# Patient Record
Sex: MALE | Race: WHITE | Employment: OTHER | URBAN - METROPOLITAN AREA
[De-identification: names, ages, dates, MRNs, and addresses within clinical notes are randomized per-mention and may not be internally consistent; named-entity substitution may affect disease eponyms.]

---

## 2017-01-04 PROCEDURE — 93288 INTERROG EVL PM/LDLS PM IP: CPT | Performed by: INTERNAL MEDICINE

## 2017-01-12 DIAGNOSIS — Z95.0 CARDIAC PACEMAKER IN SITU: Primary | ICD-10-CM

## 2017-01-12 DIAGNOSIS — I25.10 ASHD (ARTERIOSCLEROTIC HEART DISEASE): ICD-10-CM

## 2017-05-09 RX ORDER — SPIRONOLACTONE 25 MG/1
25 TABLET ORAL DAILY
Qty: 90 TABLET | Refills: 3 | Status: SHIPPED | OUTPATIENT
Start: 2017-05-09 | End: 2017-06-21 | Stop reason: SDUPTHER

## 2017-06-08 ENCOUNTER — OFFICE VISIT (OUTPATIENT)
Dept: CARDIOLOGY | Age: 82
End: 2017-06-08
Payer: MEDICARE

## 2017-06-08 VITALS
HEART RATE: 81 BPM | SYSTOLIC BLOOD PRESSURE: 115 MMHG | WEIGHT: 200 LBS | BODY MASS INDEX: 31.39 KG/M2 | DIASTOLIC BLOOD PRESSURE: 78 MMHG | HEIGHT: 67 IN | OXYGEN SATURATION: 97 %

## 2017-06-08 DIAGNOSIS — Z79.01 CHRONIC ANTICOAGULATION: ICD-10-CM

## 2017-06-08 DIAGNOSIS — I25.10 ASHD (ARTERIOSCLEROTIC HEART DISEASE): ICD-10-CM

## 2017-06-08 DIAGNOSIS — I10 ESSENTIAL HYPERTENSION: ICD-10-CM

## 2017-06-08 DIAGNOSIS — I48.20 CHRONIC ATRIAL FIBRILLATION (HCC): Primary | ICD-10-CM

## 2017-06-08 DIAGNOSIS — Z95.1 S/P CABG (CORONARY ARTERY BYPASS GRAFT): ICD-10-CM

## 2017-06-08 DIAGNOSIS — E78.5 DYSLIPIDEMIA: ICD-10-CM

## 2017-06-08 PROCEDURE — 1123F ACP DISCUSS/DSCN MKR DOCD: CPT | Performed by: INTERNAL MEDICINE

## 2017-06-08 PROCEDURE — G8598 ASA/ANTIPLAT THER USED: HCPCS | Performed by: INTERNAL MEDICINE

## 2017-06-08 PROCEDURE — 4040F PNEUMOC VAC/ADMIN/RCVD: CPT | Performed by: INTERNAL MEDICINE

## 2017-06-08 PROCEDURE — G8427 DOCREV CUR MEDS BY ELIG CLIN: HCPCS | Performed by: INTERNAL MEDICINE

## 2017-06-08 PROCEDURE — 1036F TOBACCO NON-USER: CPT | Performed by: INTERNAL MEDICINE

## 2017-06-08 PROCEDURE — 99214 OFFICE O/P EST MOD 30 MIN: CPT | Performed by: INTERNAL MEDICINE

## 2017-06-08 PROCEDURE — G8417 CALC BMI ABV UP PARAM F/U: HCPCS | Performed by: INTERNAL MEDICINE

## 2017-06-08 RX ORDER — METOPROLOL SUCCINATE 50 MG/1
50 TABLET, EXTENDED RELEASE ORAL 2 TIMES DAILY
Qty: 30 TABLET | Refills: 3 | Status: SHIPPED | OUTPATIENT
Start: 2017-06-08 | End: 2017-08-09 | Stop reason: SDUPTHER

## 2017-06-21 RX ORDER — SPIRONOLACTONE 25 MG/1
25 TABLET ORAL DAILY
Qty: 90 TABLET | Refills: 3 | Status: SHIPPED | OUTPATIENT
Start: 2017-06-21 | End: 2018-07-24 | Stop reason: SDUPTHER

## 2017-06-21 RX ORDER — RIVAROXABAN 15 MG/1
15 TABLET, FILM COATED ORAL DAILY
Qty: 90 TABLET | Refills: 3 | Status: ON HOLD | OUTPATIENT
Start: 2017-06-21 | End: 2018-05-05 | Stop reason: HOSPADM

## 2017-07-06 ENCOUNTER — TELEPHONE (OUTPATIENT)
Dept: CARDIOLOGY | Age: 82
End: 2017-07-06

## 2017-08-09 RX ORDER — METOPROLOL SUCCINATE 50 MG/1
50 TABLET, EXTENDED RELEASE ORAL 2 TIMES DAILY
Qty: 180 TABLET | Refills: 3 | Status: SHIPPED | OUTPATIENT
Start: 2017-08-09 | End: 2018-04-10 | Stop reason: SDUPTHER

## 2017-10-05 PROCEDURE — 93294 REM INTERROG EVL PM/LDLS PM: CPT | Performed by: INTERNAL MEDICINE

## 2017-10-05 PROCEDURE — 93296 REM INTERROG EVL PM/IDS: CPT | Performed by: INTERNAL MEDICINE

## 2017-10-11 ENCOUNTER — TELEPHONE (OUTPATIENT)
Dept: CARDIOLOGY | Age: 82
End: 2017-10-11

## 2017-10-11 DIAGNOSIS — I48.19 PERSISTENT ATRIAL FIBRILLATION (HCC): ICD-10-CM

## 2017-10-11 DIAGNOSIS — I48.91 ATRIAL FIBRILLATION WITH RAPID VENTRICULAR RESPONSE (HCC): ICD-10-CM

## 2017-10-11 DIAGNOSIS — Z95.0 CARDIAC PACEMAKER IN SITU: Primary | ICD-10-CM

## 2017-10-11 NOTE — TELEPHONE ENCOUNTER
Pt called back to find out more about his results on home monitoring but had to leave message. Called patient back and asked what his questions were. Pt stated that he was wondering if he was in rhythm at all. Spoke with Dr Varinder Lujan and Dr. Varinder Lujan had looked over home monitor results again and had stated that the patient has been in afib 100 percent of the time but his HR is well controlled. Let pt know this information and pt verbalized understanding.

## 2017-10-23 ENCOUNTER — HOSPITAL ENCOUNTER (OUTPATIENT)
Dept: LAB | Age: 82
Discharge: HOME | End: 2017-10-23
Payer: MEDICARE

## 2017-10-23 LAB
ABSOLUTE EOS #: 0.2 K/UL (ref 0–0.4)
ABSOLUTE IMMATURE GRANULOCYTE: NORMAL K/UL (ref 0–0.3)
ABSOLUTE LYMPH #: 2.6 K/UL (ref 1–4.8)
ABSOLUTE MONO #: 0.5 K/UL (ref 0–1)
ANION GAP SERPL CALCULATED.3IONS-SCNC: 20 MMOL/L (ref 9–17)
BASOPHILS # BLD: 1 %
BASOPHILS ABSOLUTE: 0 K/UL (ref 0–0.2)
BUN BLDV-MCNC: 27 MG/DL (ref 8–23)
BUN/CREAT BLD: 20 (ref 9–20)
CALCIUM SERPL-MCNC: 9.4 MG/DL (ref 8.6–10.4)
CHLORIDE BLD-SCNC: 105 MMOL/L (ref 98–107)
CO2: 20 MMOL/L (ref 20–31)
CREAT SERPL-MCNC: 1.34 MG/DL (ref 0.7–1.2)
DIFFERENTIAL TYPE: NORMAL
EOSINOPHILS RELATIVE PERCENT: 2 %
ESTIMATED AVERAGE GLUCOSE: 120 MG/DL
GFR AFRICAN AMERICAN: >60 ML/MIN
GFR NON-AFRICAN AMERICAN: 51 ML/MIN
GFR SERPL CREATININE-BSD FRML MDRD: ABNORMAL ML/MIN/{1.73_M2}
GFR SERPL CREATININE-BSD FRML MDRD: ABNORMAL ML/MIN/{1.73_M2}
GLUCOSE BLD-MCNC: 105 MG/DL (ref 70–99)
HBA1C MFR BLD: 5.8 % (ref 4.8–5.9)
HCT VFR BLD CALC: 46.1 % (ref 41–53)
HEMOGLOBIN: 14.7 G/DL (ref 13.5–17)
IMMATURE GRANULOCYTES: NORMAL %
LYMPHOCYTES # BLD: 40 %
MCH RBC QN AUTO: 28.5 PG (ref 26–34)
MCHC RBC AUTO-ENTMCNC: 31.9 G/DL (ref 31–37)
MCV RBC AUTO: 89.3 FL (ref 80–100)
MONOCYTES # BLD: 7 %
PDW BLD-RTO: 14.8 % (ref 12.1–15.2)
PLATELET # BLD: 159 K/UL (ref 140–450)
PLATELET ESTIMATE: NORMAL
PMV BLD AUTO: 8.4 FL (ref 6–12)
POTASSIUM SERPL-SCNC: 5.3 MMOL/L (ref 3.7–5.3)
RBC # BLD: 5.16 M/UL (ref 4.5–5.9)
RBC # BLD: NORMAL 10*6/UL
SEG NEUTROPHILS: 50 %
SEGMENTED NEUTROPHILS ABSOLUTE COUNT: 3.3 K/UL (ref 1.8–7.7)
SODIUM BLD-SCNC: 145 MMOL/L (ref 135–144)
WBC # BLD: 6.6 K/UL (ref 3.5–11)
WBC # BLD: NORMAL 10*3/UL

## 2017-10-23 PROCEDURE — 80048 BASIC METABOLIC PNL TOTAL CA: CPT

## 2017-10-23 PROCEDURE — 36415 COLL VENOUS BLD VENIPUNCTURE: CPT

## 2017-10-23 PROCEDURE — 85025 COMPLETE CBC W/AUTO DIFF WBC: CPT

## 2017-10-23 PROCEDURE — 83036 HEMOGLOBIN GLYCOSYLATED A1C: CPT

## 2017-12-12 ENCOUNTER — OFFICE VISIT (OUTPATIENT)
Dept: CARDIOLOGY | Age: 82
End: 2017-12-12
Payer: MEDICARE

## 2017-12-12 VITALS
HEART RATE: 69 BPM | DIASTOLIC BLOOD PRESSURE: 76 MMHG | HEIGHT: 67 IN | OXYGEN SATURATION: 97 % | SYSTOLIC BLOOD PRESSURE: 118 MMHG | WEIGHT: 208.8 LBS | BODY MASS INDEX: 32.77 KG/M2

## 2017-12-12 DIAGNOSIS — Z79.01 CHRONIC ANTICOAGULATION: ICD-10-CM

## 2017-12-12 DIAGNOSIS — I10 ESSENTIAL HYPERTENSION: ICD-10-CM

## 2017-12-12 DIAGNOSIS — I48.20 CHRONIC ATRIAL FIBRILLATION (HCC): Primary | ICD-10-CM

## 2017-12-12 DIAGNOSIS — I50.32 HEART FAILURE, DIASTOLIC, CHRONIC (HCC): ICD-10-CM

## 2017-12-12 DIAGNOSIS — I50.32 CHRONIC DIASTOLIC CHF (CONGESTIVE HEART FAILURE), NYHA CLASS 3 (HCC): ICD-10-CM

## 2017-12-12 DIAGNOSIS — E78.5 DYSLIPIDEMIA: ICD-10-CM

## 2017-12-12 DIAGNOSIS — I20.8 CHRONIC STABLE ANGINA (HCC): ICD-10-CM

## 2017-12-12 DIAGNOSIS — Z95.1 S/P CABG (CORONARY ARTERY BYPASS GRAFT): ICD-10-CM

## 2017-12-12 DIAGNOSIS — I25.10 ASHD (ARTERIOSCLEROTIC HEART DISEASE): ICD-10-CM

## 2017-12-12 PROCEDURE — 1123F ACP DISCUSS/DSCN MKR DOCD: CPT | Performed by: INTERNAL MEDICINE

## 2017-12-12 PROCEDURE — 1036F TOBACCO NON-USER: CPT | Performed by: INTERNAL MEDICINE

## 2017-12-12 PROCEDURE — G8417 CALC BMI ABV UP PARAM F/U: HCPCS | Performed by: INTERNAL MEDICINE

## 2017-12-12 PROCEDURE — 99214 OFFICE O/P EST MOD 30 MIN: CPT | Performed by: INTERNAL MEDICINE

## 2017-12-12 PROCEDURE — G8484 FLU IMMUNIZE NO ADMIN: HCPCS | Performed by: INTERNAL MEDICINE

## 2017-12-12 PROCEDURE — 4040F PNEUMOC VAC/ADMIN/RCVD: CPT | Performed by: INTERNAL MEDICINE

## 2017-12-12 PROCEDURE — G8427 DOCREV CUR MEDS BY ELIG CLIN: HCPCS | Performed by: INTERNAL MEDICINE

## 2017-12-12 PROCEDURE — G8598 ASA/ANTIPLAT THER USED: HCPCS | Performed by: INTERNAL MEDICINE

## 2017-12-12 PROCEDURE — 93280 PM DEVICE PROGR EVAL DUAL: CPT | Performed by: INTERNAL MEDICINE

## 2017-12-12 NOTE — PROGRESS NOTES
Medical History:    Past Medical History:   Diagnosis Date    A-fib ([address]     Abnormal ultrasound of lower extremity 7/22/15    Moderate to severe bilateral,multifocal,arterial insufficiency in  both legs. Minimal inflow component. Claudication present during exercise componet with worsenind NIVIA on the left post-exercise.  Atrial fibrillation (HCC)     CAD (coronary artery disease)     Cancer (HCC)     bladder    History of Holter monitoring 9/21/15    atrial fibrillation/flutter throughout with an average HR of 67 bpm. 78 pauses >2 seconds and 1 pause >3 seconds,but all occured during typical hours of sleep.  History of Holter monitoring 5/27/16    Paroxysmal atrial fib with rapid ventricular response (A-Fib 38% of the time). Occasional isolated PVC's    History of Holter monitoring 08/09/2016    Atrial Fibrillation throughout with heart rates ranging between  bpm.  Four 2 second pauses.  Occasional PVC's    Hypertension     Pacemaker 11/07/2016    Medtronic      Past Surgical History:  Past Surgical History:   Procedure Laterality Date    ABDOMINAL AORTIC ANEURYSM [address] [facility]    APPENDECTOMY      CHOLECYSTECTOMY      CORONARY ARTERY BYPASS GRAFT  1995    x3 [facility]    EYE SURGERY Bilateral     CATARACT REMOVAL    [facility]    TONSILLECTOMY AND ADENOIDECTOMY       Social History:    History   Smoking Status    Former Smoker    Packs/day: 1.00    Years: 50.00    Types: Cigarettes, Pipe   Smokeless Tobacco    Never Used     Current Medications:  Outpatient Prescriptions Marked as Taking for the 12/12/17 encounter (Office Visit) with Kaitlynn Michelle MD   Medication Sig Dispense Refill    metoprolol succinate (TOPROL XL) 50 MG extended release tablet Take 1 tablet by mouth 2 times daily 180 tablet 3    spironolactone (ALDACTONE) 25 MG tablet Take 1 tablet by mouth daily 90 tablet 3    XARELTO 15 MG TABS tablet Take murmur. .The apical impulses not displaced. The carotid upstroke is normal in amplitude and contour without delay or bruit. JVP is not elevated. ABDOMEN:  Normal bowel sounds, non-distended and non-tender to palpation. EXT: No edema, no calf tenderness. DATA:      Lab Results   Component Value Date    CREATININE 1.34 (H) 10/23/2017    BUN 27 (H) 10/23/2017     (H) 10/23/2017    K 5.3 10/23/2017     10/23/2017    CO2 20 10/23/2017         Assessment:     1. Chronic atrial fibrillation ([address]     2. Chronic diastolic CHF (congestive heart failure), NYHA class 3 ([address]     3. ASHD (arteriosclerotic heart disease)     4. Heart failure, diastolic, chronic ([address]     5. Chronic anticoagulation     6. Essential hypertension     7. Dyslipidemia         Plan:   Patient with extensive cardiac history as listed in HPI. History of coronary artery disease status post CABG in 1995, recent stress test negative for ischemia. Long-standing history of atrial fibrillation, failed multiple antiarrhythmic drug therapy. S/P pacemaker placement on 11/07/2016    History of peripheral arterial disease and abdominal aortic aneurysm s/p repair. 1-Atrial fibrillation  Pacemaker interrogated today, chronic atrial fibrillation with Controlled ventricular rate. Pacemaker interrogated for more aggressive traits response. Continue Toprol-XL 50 mg twice a day. Continue on Xarelto 15 mg daily (renally adjusted dose). No bleeding issues. 2-CAD s/p CABG  Stable, denies chest pain or significant shortness of breath. Negative stress test 10/2016  Known normal ejection fraction. 3-Essential hypertension and dyslipidemia. 4-Chronic diastolic CHF, NYHA class III  Blood pressure controlled. Continue simvastatin. No signs of volume overload. Continue Toprol-XL and Aldactone as above.     Electronically signed by Trace Earl MD on 12/12/2017

## 2017-12-14 DIAGNOSIS — I49.5 TACHY-BRADY SYNDROME (HCC): ICD-10-CM

## 2017-12-14 DIAGNOSIS — Z95.0 CARDIAC PACEMAKER IN SITU: Primary | ICD-10-CM

## 2018-01-08 ENCOUNTER — NURSE ONLY (OUTPATIENT)
Dept: CARDIOLOGY | Age: 83
End: 2018-01-08

## 2018-01-08 VITALS
BODY MASS INDEX: 33.12 KG/M2 | WEIGHT: 211 LBS | HEIGHT: 67 IN | SYSTOLIC BLOOD PRESSURE: 130 MMHG | OXYGEN SATURATION: 98 % | HEART RATE: 114 BPM | DIASTOLIC BLOOD PRESSURE: 81 MMHG | RESPIRATION RATE: 20 BRPM

## 2018-03-08 ENCOUNTER — NURSE ONLY (OUTPATIENT)
Dept: CARDIOLOGY | Age: 83
End: 2018-03-08
Payer: MEDICARE

## 2018-03-08 DIAGNOSIS — Z95.0 PACEMAKER: Primary | ICD-10-CM

## 2018-03-08 DIAGNOSIS — I49.5 TACHY-BRADY SYNDROME (HCC): ICD-10-CM

## 2018-03-09 ENCOUNTER — TELEPHONE (OUTPATIENT)
Dept: CARDIOLOGY | Age: 83
End: 2018-03-09

## 2018-03-09 PROCEDURE — 93294 REM INTERROG EVL PM/LDLS PM: CPT | Performed by: INTERNAL MEDICINE

## 2018-03-09 PROCEDURE — 93296 REM INTERROG EVL PM/IDS: CPT | Performed by: INTERNAL MEDICINE

## 2018-03-23 ENCOUNTER — TELEPHONE (OUTPATIENT)
Dept: CARDIOLOGY | Age: 83
End: 2018-03-23

## 2018-03-23 NOTE — TELEPHONE ENCOUNTER
Per  pt in chronic AFib and would like to see him in 2 weeks. Pt called and in [place] until end of April.  Made appt for 4/23/18

## 2018-04-10 RX ORDER — METOPROLOL SUCCINATE 50 MG/1
50 TABLET, EXTENDED RELEASE ORAL 2 TIMES DAILY
Qty: 180 TABLET | Refills: 3 | Status: SHIPPED | OUTPATIENT
Start: 2018-04-10 | End: 2018-04-10 | Stop reason: DRUGHIGH

## 2018-04-10 RX ORDER — METOPROLOL SUCCINATE 100 MG/1
100 TABLET, EXTENDED RELEASE ORAL 2 TIMES DAILY
Qty: 180 TABLET | Refills: 3 | Status: SHIPPED | OUTPATIENT
Start: 2018-04-10 | End: 2018-10-15 | Stop reason: SDUPTHER

## 2018-04-19 ENCOUNTER — HOSPITAL ENCOUNTER (OUTPATIENT)
Age: 83
Discharge: HOME | End: 2018-04-21
Payer: MEDICARE

## 2018-04-19 ENCOUNTER — HOSPITAL ENCOUNTER (OUTPATIENT)
Age: 83
Discharge: HOME | End: 2018-04-19
Payer: MEDICARE

## 2018-04-19 ENCOUNTER — HOSPITAL ENCOUNTER (OUTPATIENT)
Dept: GENERAL RADIOLOGY | Age: 83
Discharge: HOME | End: 2018-04-21
Payer: MEDICARE

## 2018-04-19 ENCOUNTER — HOSPITAL ENCOUNTER (OUTPATIENT)
Dept: NON INVASIVE DIAGNOSTICS | Age: 83
Discharge: HOME | End: 2018-04-19
Payer: MEDICARE

## 2018-04-19 ENCOUNTER — OFFICE VISIT (OUTPATIENT)
Dept: CARDIOLOGY | Age: 83
End: 2018-04-19
Payer: MEDICARE

## 2018-04-19 VITALS
HEIGHT: 67 IN | HEART RATE: 84 BPM | WEIGHT: 195.8 LBS | SYSTOLIC BLOOD PRESSURE: 135 MMHG | OXYGEN SATURATION: 98 % | BODY MASS INDEX: 30.73 KG/M2 | DIASTOLIC BLOOD PRESSURE: 72 MMHG

## 2018-04-19 DIAGNOSIS — I49.5 TACHY-BRADY SYNDROME (HCC): ICD-10-CM

## 2018-04-19 DIAGNOSIS — R06.02 SHORTNESS OF BREATH: ICD-10-CM

## 2018-04-19 DIAGNOSIS — I25.119 CORONARY ARTERY DISEASE INVOLVING NATIVE CORONARY ARTERY OF NATIVE HEART WITH ANGINA PECTORIS (HCC): ICD-10-CM

## 2018-04-19 DIAGNOSIS — I48.91 ATRIAL FIBRILLATION WITH RAPID VENTRICULAR RESPONSE (HCC): ICD-10-CM

## 2018-04-19 DIAGNOSIS — I50.32 CHRONIC DIASTOLIC CHF (CONGESTIVE HEART FAILURE) (HCC): ICD-10-CM

## 2018-04-19 DIAGNOSIS — I48.91 ATRIAL FIBRILLATION WITH RAPID VENTRICULAR RESPONSE (HCC): Primary | ICD-10-CM

## 2018-04-19 LAB
ANION GAP SERPL CALCULATED.3IONS-SCNC: 11 MMOL/L (ref 9–17)
BNP INTERPRETATION: ABNORMAL
BUN BLDV-MCNC: 40 MG/DL (ref 8–23)
BUN/CREAT BLD: 22 (ref 9–20)
CALCIUM SERPL-MCNC: 9.4 MG/DL (ref 8.6–10.4)
CHLORIDE BLD-SCNC: 104 MMOL/L (ref 98–107)
CO2: 24 MMOL/L (ref 20–31)
CREAT SERPL-MCNC: 1.78 MG/DL (ref 0.7–1.2)
GFR AFRICAN AMERICAN: 44 ML/MIN
GFR NON-AFRICAN AMERICAN: 36 ML/MIN
GFR SERPL CREATININE-BSD FRML MDRD: ABNORMAL ML/MIN/{1.73_M2}
GFR SERPL CREATININE-BSD FRML MDRD: ABNORMAL ML/MIN/{1.73_M2}
GLUCOSE BLD-MCNC: 103 MG/DL (ref 70–99)
LV EF: 55 %
LVEF MODALITY: NORMAL
POTASSIUM SERPL-SCNC: 4.8 MMOL/L (ref 3.7–5.3)
PRO-BNP: 2875 PG/ML
SODIUM BLD-SCNC: 139 MMOL/L (ref 135–144)

## 2018-04-19 PROCEDURE — 99214 OFFICE O/P EST MOD 30 MIN: CPT | Performed by: INTERNAL MEDICINE

## 2018-04-19 PROCEDURE — 83880 ASSAY OF NATRIURETIC PEPTIDE: CPT

## 2018-04-19 PROCEDURE — 71046 X-RAY EXAM CHEST 2 VIEWS: CPT

## 2018-04-19 PROCEDURE — 93306 TTE W/DOPPLER COMPLETE: CPT

## 2018-04-19 PROCEDURE — G8417 CALC BMI ABV UP PARAM F/U: HCPCS | Performed by: INTERNAL MEDICINE

## 2018-04-19 PROCEDURE — G8598 ASA/ANTIPLAT THER USED: HCPCS | Performed by: INTERNAL MEDICINE

## 2018-04-19 PROCEDURE — 93000 ELECTROCARDIOGRAM COMPLETE: CPT | Performed by: INTERNAL MEDICINE

## 2018-04-19 PROCEDURE — 1036F TOBACCO NON-USER: CPT | Performed by: INTERNAL MEDICINE

## 2018-04-19 PROCEDURE — 80048 BASIC METABOLIC PNL TOTAL CA: CPT

## 2018-04-19 PROCEDURE — 4040F PNEUMOC VAC/ADMIN/RCVD: CPT | Performed by: INTERNAL MEDICINE

## 2018-04-19 PROCEDURE — G8427 DOCREV CUR MEDS BY ELIG CLIN: HCPCS | Performed by: INTERNAL MEDICINE

## 2018-04-19 PROCEDURE — 36415 COLL VENOUS BLD VENIPUNCTURE: CPT

## 2018-04-19 PROCEDURE — 1123F ACP DISCUSS/DSCN MKR DOCD: CPT | Performed by: INTERNAL MEDICINE

## 2018-04-20 ENCOUNTER — TELEPHONE (OUTPATIENT)
Dept: CARDIOLOGY | Age: 83
End: 2018-04-20

## 2018-04-20 ASSESSMENT — ENCOUNTER SYMPTOMS
EYE PAIN: 0
BLURRED VISION: 0
DIARRHEA: 0
VOMITING: 0
ORTHOPNEA: 0
HEMOPTYSIS: 0
ABDOMINAL PAIN: 0
SHORTNESS OF BREATH: 1
COUGH: 1
NAUSEA: 0
SINUS PAIN: 1
CONSTIPATION: 0
HEARTBURN: 0
EYE DISCHARGE: 0
BACK PAIN: 1
DOUBLE VISION: 0
PHOTOPHOBIA: 0
SPUTUM PRODUCTION: 0

## 2018-04-25 ENCOUNTER — HOSPITAL ENCOUNTER (OUTPATIENT)
Dept: NON INVASIVE DIAGNOSTICS | Age: 83
Discharge: HOME | End: 2018-04-25
Payer: MEDICARE

## 2018-04-25 DIAGNOSIS — I48.91 ATRIAL FIBRILLATION WITH RAPID VENTRICULAR RESPONSE (HCC): ICD-10-CM

## 2018-04-25 DIAGNOSIS — I25.119 CORONARY ARTERY DISEASE INVOLVING NATIVE CORONARY ARTERY OF NATIVE HEART WITH ANGINA PECTORIS (HCC): ICD-10-CM

## 2018-04-25 DIAGNOSIS — R06.02 SHORTNESS OF BREATH: ICD-10-CM

## 2018-04-25 PROCEDURE — 78452 HT MUSCLE IMAGE SPECT MULT: CPT

## 2018-04-25 PROCEDURE — A9500 TC99M SESTAMIBI: HCPCS | Performed by: INTERNAL MEDICINE

## 2018-04-25 PROCEDURE — 3430000000 HC RX DIAGNOSTIC RADIOPHARMACEUTICAL: Performed by: INTERNAL MEDICINE

## 2018-04-25 PROCEDURE — 6360000002 HC RX W HCPCS: Performed by: INTERNAL MEDICINE

## 2018-04-25 PROCEDURE — 93017 CV STRESS TEST TRACING ONLY: CPT

## 2018-04-25 RX ADMIN — Medication 30 MILLICURIE: at 10:05

## 2018-04-25 RX ADMIN — REGADENOSON 0.4 MG: 0.08 INJECTION, SOLUTION INTRAVENOUS at 10:20

## 2018-04-26 ENCOUNTER — HOSPITAL ENCOUNTER (OUTPATIENT)
Dept: PULMONOLOGY | Age: 83
Discharge: HOME | End: 2018-04-26
Payer: MEDICARE

## 2018-04-26 ENCOUNTER — HOSPITAL ENCOUNTER (OUTPATIENT)
Dept: NON INVASIVE DIAGNOSTICS | Age: 83
Discharge: HOME | End: 2018-04-26
Payer: MEDICARE

## 2018-04-26 LAB
ALLEN TEST: ABNORMAL
CARBOXYHEMOGLOBIN: ABNORMAL % (ref 0–5)
FIO2: ABNORMAL
HCO3 ARTERIAL: 19.5 MMOL/L (ref 22–26)
METHEMOGLOBIN: ABNORMAL % (ref 0–1.9)
MODE: ABNORMAL
NEGATIVE BASE EXCESS, ART: 4.9 MMOL/L (ref 0–2)
NOTIFICATION TIME: ABNORMAL
NOTIFICATION: ABNORMAL
O2 DEVICE/FLOW/%: ABNORMAL
O2 SAT, ARTERIAL: 96.9 % (ref 95–98)
OXYHEMOGLOBIN: ABNORMAL % (ref 95–98)
PATIENT TEMP: ABNORMAL
PCO2 ARTERIAL: 34.4 MMHG (ref 35–45)
PCO2, ART, TEMP ADJ: ABNORMAL
PEEP/CPAP: ABNORMAL
PH ARTERIAL: 7.37 (ref 7.35–7.45)
PH, ART, TEMP ADJ: ABNORMAL (ref 7.35–7.45)
PO2 ARTERIAL: 91.8 MMHG (ref 80–100)
PO2, ART, TEMP ADJ: ABNORMAL MMHG (ref 80–100)
POSITIVE BASE EXCESS, ART: ABNORMAL MMOL/L (ref 0–2)
PSV: ABNORMAL
PT. POSITION: ABNORMAL
RESPIRATORY RATE: ABNORMAL
SAMPLE SITE: ABNORMAL
SET RATE: ABNORMAL
TEXT FOR RESPIRATORY: ABNORMAL
TOTAL HB: ABNORMAL G/DL (ref 12–16)
TOTAL RATE: ABNORMAL
VT: ABNORMAL

## 2018-04-26 PROCEDURE — 94664 DEMO&/EVAL PT USE INHALER: CPT

## 2018-04-26 PROCEDURE — 82805 BLOOD GASES W/O2 SATURATION: CPT

## 2018-04-26 PROCEDURE — 94726 PLETHYSMOGRAPHY LUNG VOLUMES: CPT

## 2018-04-26 PROCEDURE — 36600 WITHDRAWAL OF ARTERIAL BLOOD: CPT

## 2018-04-26 PROCEDURE — 6360000002 HC RX W HCPCS: Performed by: INTERNAL MEDICINE

## 2018-04-26 PROCEDURE — 94060 EVALUATION OF WHEEZING: CPT

## 2018-04-26 PROCEDURE — 94729 DIFFUSING CAPACITY: CPT

## 2018-04-26 RX ORDER — ALBUTEROL SULFATE 2.5 MG/3ML
2.5 SOLUTION RESPIRATORY (INHALATION) ONCE
Status: COMPLETED | OUTPATIENT
Start: 2018-04-26 | End: 2018-04-26

## 2018-04-26 RX ADMIN — ALBUTEROL SULFATE 2.5 MG: 2.5 SOLUTION RESPIRATORY (INHALATION) at 14:03

## 2018-04-30 ENCOUNTER — TELEPHONE (OUTPATIENT)
Dept: CARDIOLOGY | Age: 83
End: 2018-04-30

## 2018-05-03 ENCOUNTER — OFFICE VISIT (OUTPATIENT)
Dept: CARDIOLOGY | Age: 83
End: 2018-05-03
Payer: MEDICARE

## 2018-05-03 ENCOUNTER — HOSPITAL ENCOUNTER (INPATIENT)
Age: 83
LOS: 2 days | Discharge: HOME | DRG: 813 | End: 2018-05-05
Attending: INTERNAL MEDICINE | Admitting: INTERNAL MEDICINE
Payer: MEDICARE

## 2018-05-03 ENCOUNTER — APPOINTMENT (OUTPATIENT)
Dept: GENERAL RADIOLOGY | Age: 83
DRG: 813 | End: 2018-05-03
Attending: INTERNAL MEDICINE
Payer: MEDICARE

## 2018-05-03 VITALS
HEIGHT: 67 IN | RESPIRATION RATE: 16 BRPM | SYSTOLIC BLOOD PRESSURE: 164 MMHG | DIASTOLIC BLOOD PRESSURE: 72 MMHG | HEART RATE: 77 BPM | WEIGHT: 197 LBS | BODY MASS INDEX: 30.92 KG/M2 | OXYGEN SATURATION: 98 %

## 2018-05-03 DIAGNOSIS — D62 ANEMIA ASSOCIATED WITH ACUTE BLOOD LOSS: Primary | ICD-10-CM

## 2018-05-03 DIAGNOSIS — I50.33 ACUTE ON CHRONIC DIASTOLIC HEART FAILURE (HCC): ICD-10-CM

## 2018-05-03 DIAGNOSIS — E78.2 MIXED HYPERLIPIDEMIA: ICD-10-CM

## 2018-05-03 DIAGNOSIS — I25.119 CORONARY ARTERY DISEASE INVOLVING NATIVE CORONARY ARTERY OF NATIVE HEART WITH ANGINA PECTORIS (HCC): ICD-10-CM

## 2018-05-03 DIAGNOSIS — I50.32 CHRONIC DIASTOLIC CHF (CONGESTIVE HEART FAILURE), NYHA CLASS 3 (HCC): ICD-10-CM

## 2018-05-03 DIAGNOSIS — D62 ANEMIA DUE TO BLOOD LOSS, ACUTE: ICD-10-CM

## 2018-05-03 DIAGNOSIS — D64.9 ANEMIA, UNSPECIFIED TYPE: ICD-10-CM

## 2018-05-03 DIAGNOSIS — I48.20 CHRONIC ATRIAL FIBRILLATION (HCC): ICD-10-CM

## 2018-05-03 DIAGNOSIS — K92.2 GASTROINTESTINAL HEMORRHAGE, UNSPECIFIED GASTROINTESTINAL HEMORRHAGE TYPE: Primary | ICD-10-CM

## 2018-05-03 LAB
ABSOLUTE EOS #: 0 K/UL (ref 0–0.44)
ABSOLUTE IMMATURE GRANULOCYTE: 0.12 K/UL (ref 0–0.3)
ABSOLUTE LYMPH #: 1.92 K/UL (ref 1.1–3.7)
ABSOLUTE MONO #: 0.36 K/UL (ref 0.1–1.2)
BASOPHILS # BLD: 0 % (ref 0–2)
BASOPHILS ABSOLUTE: 0 K/UL (ref 0–0.2)
DATE, STOOL #1: ABNORMAL
DATE, STOOL #2: ABNORMAL
DATE, STOOL #3: ABNORMAL
DIFFERENTIAL TYPE: ABNORMAL
EKG ATRIAL RATE: 59 BPM
EKG Q-T INTERVAL: 358 MS
EKG QRS DURATION: 80 MS
EKG QTC CALCULATION (BAZETT): 410 MS
EKG R AXIS: 22 DEGREES
EKG T AXIS: -57 DEGREES
EKG VENTRICULAR RATE: 79 BPM
EOSINOPHILS RELATIVE PERCENT: 0 % (ref 1–4)
HCT VFR BLD CALC: 24.2 % (ref 40.7–50.3)
HEMOCCULT SP1 STL QL: POSITIVE
HEMOCCULT SP2 STL QL: ABNORMAL
HEMOCCULT SP3 STL QL: ABNORMAL
HEMOGLOBIN: 7 G/DL (ref 13–17)
IMMATURE GRANULOCYTES: 1 %
INR BLD: 1.2 (ref 0.9–1.2)
LYMPHOCYTES # BLD: 16 % (ref 24–43)
MCH RBC QN AUTO: 22.7 PG (ref 25.2–33.5)
MCHC RBC AUTO-ENTMCNC: 28.9 G/DL (ref 28.4–34.8)
MCV RBC AUTO: 78.3 FL (ref 82.6–102.9)
MONOCYTES # BLD: 3 % (ref 3–12)
MORPHOLOGY: ABNORMAL
NRBC AUTOMATED: 0.3 PER 100 WBC
PARTIAL THROMBOPLASTIN TIME: 30.7 SEC (ref 23.2–34.4)
PDW BLD-RTO: 17.1 % (ref 11.8–14.4)
PLATELET # BLD: 312 K/UL (ref 138–453)
PLATELET ESTIMATE: ABNORMAL
PMV BLD AUTO: 9.2 FL (ref 8.1–13.5)
PROTHROMBIN TIME: 12 SEC (ref 9.7–12.2)
RBC # BLD: 3.09 M/UL (ref 4.21–5.77)
RBC # BLD: ABNORMAL 10*6/UL
SEG NEUTROPHILS: 80 % (ref 36–65)
SEGMENTED NEUTROPHILS ABSOLUTE COUNT: 9.6 K/UL (ref 1.5–8.1)
TIME, STOOL #1: 1910
TIME, STOOL #2: ABNORMAL
TIME, STOOL #3: ABNORMAL
WBC # BLD: 12 K/UL (ref 3.5–11.3)
WBC # BLD: ABNORMAL 10*3/UL

## 2018-05-03 PROCEDURE — 86920 COMPATIBILITY TEST SPIN: CPT

## 2018-05-03 PROCEDURE — 86850 RBC ANTIBODY SCREEN: CPT

## 2018-05-03 PROCEDURE — 85025 COMPLETE CBC W/AUTO DIFF WBC: CPT

## 2018-05-03 PROCEDURE — G8427 DOCREV CUR MEDS BY ELIG CLIN: HCPCS | Performed by: INTERNAL MEDICINE

## 2018-05-03 PROCEDURE — 85610 PROTHROMBIN TIME: CPT

## 2018-05-03 PROCEDURE — 94760 N-INVAS EAR/PLS OXIMETRY 1: CPT

## 2018-05-03 PROCEDURE — 6370000000 HC RX 637 (ALT 250 FOR IP): Performed by: INTERNAL MEDICINE

## 2018-05-03 PROCEDURE — 71046 X-RAY EXAM CHEST 2 VIEWS: CPT

## 2018-05-03 PROCEDURE — 99215 OFFICE O/P EST HI 40 MIN: CPT | Performed by: INTERNAL MEDICINE

## 2018-05-03 PROCEDURE — 1123F ACP DISCUSS/DSCN MKR DOCD: CPT | Performed by: INTERNAL MEDICINE

## 2018-05-03 PROCEDURE — 2580000003 HC RX 258: Performed by: INTERNAL MEDICINE

## 2018-05-03 PROCEDURE — 82272 OCCULT BLD FECES 1-3 TESTS: CPT

## 2018-05-03 PROCEDURE — 2000000000 HC ICU R&B

## 2018-05-03 PROCEDURE — 86901 BLOOD TYPING SEROLOGIC RH(D): CPT

## 2018-05-03 PROCEDURE — 4040F PNEUMOC VAC/ADMIN/RCVD: CPT | Performed by: INTERNAL MEDICINE

## 2018-05-03 PROCEDURE — C9113 INJ PANTOPRAZOLE SODIUM, VIA: HCPCS | Performed by: INTERNAL MEDICINE

## 2018-05-03 PROCEDURE — 86900 BLOOD TYPING SEROLOGIC ABO: CPT

## 2018-05-03 PROCEDURE — P9016 RBC LEUKOCYTES REDUCED: HCPCS

## 2018-05-03 PROCEDURE — 85730 THROMBOPLASTIN TIME PARTIAL: CPT

## 2018-05-03 PROCEDURE — G8417 CALC BMI ABV UP PARAM F/U: HCPCS | Performed by: INTERNAL MEDICINE

## 2018-05-03 PROCEDURE — 36430 TRANSFUSION BLD/BLD COMPNT: CPT

## 2018-05-03 PROCEDURE — 6360000002 HC RX W HCPCS: Performed by: INTERNAL MEDICINE

## 2018-05-03 PROCEDURE — 36415 COLL VENOUS BLD VENIPUNCTURE: CPT

## 2018-05-03 PROCEDURE — 1036F TOBACCO NON-USER: CPT | Performed by: INTERNAL MEDICINE

## 2018-05-03 PROCEDURE — G8598 ASA/ANTIPLAT THER USED: HCPCS | Performed by: INTERNAL MEDICINE

## 2018-05-03 RX ORDER — SIMVASTATIN 20 MG
20 TABLET ORAL NIGHTLY
Status: DISCONTINUED | OUTPATIENT
Start: 2018-05-03 | End: 2018-05-05 | Stop reason: HOSPADM

## 2018-05-03 RX ORDER — ONDANSETRON 2 MG/ML
4 INJECTION INTRAMUSCULAR; INTRAVENOUS EVERY 6 HOURS PRN
Status: DISCONTINUED | OUTPATIENT
Start: 2018-05-03 | End: 2018-05-05 | Stop reason: HOSPADM

## 2018-05-03 RX ORDER — PREDNISONE 20 MG/1
20 TABLET ORAL DAILY
Status: DISCONTINUED | OUTPATIENT
Start: 2018-05-03 | End: 2018-05-04

## 2018-05-03 RX ORDER — PREDNISONE 20 MG/1
20 TABLET ORAL DAILY
Status: ON HOLD | COMMUNITY
Start: 2018-05-02 | End: 2018-05-05 | Stop reason: HOSPADM

## 2018-05-03 RX ORDER — SODIUM CHLORIDE 9 MG/ML
INJECTION, SOLUTION INTRAVENOUS CONTINUOUS
Status: DISCONTINUED | OUTPATIENT
Start: 2018-05-03 | End: 2018-05-05 | Stop reason: HOSPADM

## 2018-05-03 RX ORDER — METOPROLOL SUCCINATE 100 MG/1
100 TABLET, EXTENDED RELEASE ORAL 2 TIMES DAILY
Status: DISCONTINUED | OUTPATIENT
Start: 2018-05-03 | End: 2018-05-05 | Stop reason: HOSPADM

## 2018-05-03 RX ORDER — 0.9 % SODIUM CHLORIDE 0.9 %
250 INTRAVENOUS SOLUTION INTRAVENOUS ONCE
Status: COMPLETED | OUTPATIENT
Start: 2018-05-03 | End: 2018-05-04

## 2018-05-03 RX ORDER — PREDNISONE 10 MG/1
10 TABLET ORAL 2 TIMES DAILY
Status: ON HOLD | COMMUNITY
Start: 2018-05-05 | End: 2018-05-05 | Stop reason: HOSPADM

## 2018-05-03 RX ORDER — PIOGLITAZONEHYDROCHLORIDE 15 MG/1
30 TABLET ORAL DAILY
Status: DISCONTINUED | OUTPATIENT
Start: 2018-05-04 | End: 2018-05-05 | Stop reason: HOSPADM

## 2018-05-03 RX ORDER — SODIUM CHLORIDE 0.9 % (FLUSH) 0.9 %
10 SYRINGE (ML) INJECTION EVERY 12 HOURS SCHEDULED
Status: DISCONTINUED | OUTPATIENT
Start: 2018-05-03 | End: 2018-05-05 | Stop reason: HOSPADM

## 2018-05-03 RX ORDER — SODIUM CHLORIDE 0.9 % (FLUSH) 0.9 %
10 SYRINGE (ML) INJECTION PRN
Status: DISCONTINUED | OUTPATIENT
Start: 2018-05-03 | End: 2018-05-05 | Stop reason: HOSPADM

## 2018-05-03 RX ORDER — PREDNISONE 10 MG/1
TABLET ORAL
Refills: 5 | Status: ON HOLD | COMMUNITY
Start: 2018-05-01 | End: 2018-05-03 | Stop reason: DRUGHIGH

## 2018-05-03 RX ORDER — PREDNISONE 10 MG/1
10 TABLET ORAL DAILY
Status: ON HOLD | COMMUNITY
Start: 2018-05-02 | End: 2018-05-05 | Stop reason: HOSPADM

## 2018-05-03 RX ORDER — SPIRONOLACTONE 25 MG/1
25 TABLET ORAL DAILY
Status: DISCONTINUED | OUTPATIENT
Start: 2018-05-03 | End: 2018-05-05 | Stop reason: HOSPADM

## 2018-05-03 RX ORDER — PREDNISONE 10 MG/1
10 TABLET ORAL DAILY
Status: DISCONTINUED | OUTPATIENT
Start: 2018-05-03 | End: 2018-05-04

## 2018-05-03 RX ORDER — ACETAMINOPHEN 325 MG/1
650 TABLET ORAL EVERY 4 HOURS PRN
Status: DISCONTINUED | OUTPATIENT
Start: 2018-05-03 | End: 2018-05-05 | Stop reason: HOSPADM

## 2018-05-03 RX ADMIN — PREDNISONE 10 MG: 10 TABLET ORAL at 20:56

## 2018-05-03 RX ADMIN — SODIUM CHLORIDE: 9 INJECTION, SOLUTION INTRAVENOUS at 19:17

## 2018-05-03 RX ADMIN — SODIUM CHLORIDE 250 ML: 9 INJECTION, SOLUTION INTRAVENOUS at 22:00

## 2018-05-03 RX ADMIN — SODIUM CHLORIDE 80 MG: 9 INJECTION, SOLUTION INTRAVENOUS at 19:17

## 2018-05-03 RX ADMIN — METOPROLOL SUCCINATE 100 MG: 100 TABLET, FILM COATED, EXTENDED RELEASE ORAL at 20:40

## 2018-05-03 RX ADMIN — ACETAMINOPHEN 650 MG: 325 TABLET ORAL at 23:37

## 2018-05-03 ASSESSMENT — ENCOUNTER SYMPTOMS
VOMITING: 0
ORTHOPNEA: 0
DOUBLE VISION: 0
COUGH: 1
EYE PAIN: 0
ABDOMINAL PAIN: 0
HEMOPTYSIS: 0
NAUSEA: 0
HEARTBURN: 0
CONSTIPATION: 0
SPUTUM PRODUCTION: 0
DIARRHEA: 0
BACK PAIN: 1
SHORTNESS OF BREATH: 1
PHOTOPHOBIA: 0
EYE DISCHARGE: 0
BLURRED VISION: 0

## 2018-05-03 ASSESSMENT — PAIN SCALES - GENERAL
PAINLEVEL_OUTOF10: 0
PAINLEVEL_OUTOF10: 0

## 2018-05-04 PROBLEM — D62 ANEMIA DUE TO BLOOD LOSS, ACUTE: Status: ACTIVE | Noted: 2018-05-03

## 2018-05-04 PROBLEM — K92.2 GI BLEED: Status: ACTIVE | Noted: 2018-05-04

## 2018-05-04 PROBLEM — D68.32 HEMORRHAGIC DISORDER DUE TO EXTRINSIC CIRCULATING ANTICOAGULANTS (HCC): Status: ACTIVE | Noted: 2018-05-04

## 2018-05-04 LAB
GLUCOSE BLD-MCNC: 107 MG/DL (ref 74–100)
GLUCOSE BLD-MCNC: 140 MG/DL (ref 74–100)
GLUCOSE BLD-MCNC: 93 MG/DL (ref 74–100)
HCT VFR BLD CALC: 30.6 % (ref 40.7–50.3)
HEMOGLOBIN: 10.2 G/DL (ref 13–17)
HEMOGLOBIN: 7.4 G/DL (ref 13–17)
HEMOGLOBIN: 7.9 G/DL (ref 13–17)
HEMOGLOBIN: 9.4 G/DL (ref 13–17)

## 2018-05-04 PROCEDURE — 36430 TRANSFUSION BLD/BLD COMPNT: CPT

## 2018-05-04 PROCEDURE — 6370000000 HC RX 637 (ALT 250 FOR IP): Performed by: INTERNAL MEDICINE

## 2018-05-04 PROCEDURE — 2580000003 HC RX 258: Performed by: INTERNAL MEDICINE

## 2018-05-04 PROCEDURE — 93005 ELECTROCARDIOGRAM TRACING: CPT

## 2018-05-04 PROCEDURE — 6360000002 HC RX W HCPCS: Performed by: PHYSICIAN ASSISTANT

## 2018-05-04 PROCEDURE — 2580000003 HC RX 258: Performed by: PHYSICIAN ASSISTANT

## 2018-05-04 PROCEDURE — 1200000000 HC SEMI PRIVATE

## 2018-05-04 PROCEDURE — C9113 INJ PANTOPRAZOLE SODIUM, VIA: HCPCS | Performed by: PHYSICIAN ASSISTANT

## 2018-05-04 PROCEDURE — 85014 HEMATOCRIT: CPT

## 2018-05-04 PROCEDURE — 82947 ASSAY GLUCOSE BLOOD QUANT: CPT

## 2018-05-04 PROCEDURE — 36415 COLL VENOUS BLD VENIPUNCTURE: CPT

## 2018-05-04 PROCEDURE — 85018 HEMOGLOBIN: CPT

## 2018-05-04 PROCEDURE — 94640 AIRWAY INHALATION TREATMENT: CPT

## 2018-05-04 PROCEDURE — P9016 RBC LEUKOCYTES REDUCED: HCPCS

## 2018-05-04 RX ORDER — DEXTROSE MONOHYDRATE 25 G/50ML
12.5 INJECTION, SOLUTION INTRAVENOUS PRN
Status: DISCONTINUED | OUTPATIENT
Start: 2018-05-04 | End: 2018-05-05 | Stop reason: HOSPADM

## 2018-05-04 RX ORDER — NICOTINE POLACRILEX 4 MG
15 LOZENGE BUCCAL PRN
Status: DISCONTINUED | OUTPATIENT
Start: 2018-05-04 | End: 2018-05-05 | Stop reason: HOSPADM

## 2018-05-04 RX ORDER — TRAZODONE HYDROCHLORIDE 50 MG/1
50 TABLET ORAL NIGHTLY
Status: DISCONTINUED | OUTPATIENT
Start: 2018-05-04 | End: 2018-05-05 | Stop reason: HOSPADM

## 2018-05-04 RX ORDER — PANTOPRAZOLE SODIUM 40 MG/10ML
40 INJECTION, POWDER, LYOPHILIZED, FOR SOLUTION INTRAVENOUS 2 TIMES DAILY
Status: DISCONTINUED | OUTPATIENT
Start: 2018-05-04 | End: 2018-05-05 | Stop reason: HOSPADM

## 2018-05-04 RX ORDER — 0.9 % SODIUM CHLORIDE 0.9 %
250 INTRAVENOUS SOLUTION INTRAVENOUS ONCE
Status: COMPLETED | OUTPATIENT
Start: 2018-05-04 | End: 2018-05-04

## 2018-05-04 RX ORDER — DEXTROSE MONOHYDRATE 50 MG/ML
100 INJECTION, SOLUTION INTRAVENOUS PRN
Status: DISCONTINUED | OUTPATIENT
Start: 2018-05-04 | End: 2018-05-05 | Stop reason: HOSPADM

## 2018-05-04 RX ADMIN — SPIRONOLACTONE 25 MG: 25 TABLET ORAL at 08:01

## 2018-05-04 RX ADMIN — METOPROLOL SUCCINATE 100 MG: 100 TABLET, FILM COATED, EXTENDED RELEASE ORAL at 21:00

## 2018-05-04 RX ADMIN — PANTOPRAZOLE SODIUM 40 MG: 40 INJECTION, POWDER, FOR SOLUTION INTRAVENOUS at 21:00

## 2018-05-04 RX ADMIN — SODIUM CHLORIDE: 9 INJECTION, SOLUTION INTRAVENOUS at 14:15

## 2018-05-04 RX ADMIN — TRAZODONE HYDROCHLORIDE 50 MG: 50 TABLET ORAL at 21:00

## 2018-05-04 RX ADMIN — Medication 10 ML: at 08:01

## 2018-05-04 RX ADMIN — METOPROLOL SUCCINATE 100 MG: 100 TABLET, FILM COATED, EXTENDED RELEASE ORAL at 08:01

## 2018-05-04 RX ADMIN — SODIUM CHLORIDE: 9 INJECTION, SOLUTION INTRAVENOUS at 03:40

## 2018-05-04 RX ADMIN — SODIUM CHLORIDE: 9 INJECTION, SOLUTION INTRAVENOUS at 20:59

## 2018-05-04 RX ADMIN — TIOTROPIUM BROMIDE 18 MCG: 18 CAPSULE ORAL; RESPIRATORY (INHALATION) at 07:14

## 2018-05-04 RX ADMIN — PANTOPRAZOLE SODIUM 40 MG: 40 INJECTION, POWDER, FOR SOLUTION INTRAVENOUS at 08:01

## 2018-05-04 RX ADMIN — SODIUM CHLORIDE 250 ML: 9 INJECTION, SOLUTION INTRAVENOUS at 08:24

## 2018-05-04 RX ADMIN — PIOGLITAZONE 30 MG: 15 TABLET ORAL at 08:01

## 2018-05-04 ASSESSMENT — PAIN SCALES - GENERAL
PAINLEVEL_OUTOF10: 0

## 2018-05-05 ENCOUNTER — HOSPITAL ENCOUNTER (OUTPATIENT)
Age: 83
Discharge: HOME | DRG: 813 | End: 2018-05-05
Payer: MEDICARE

## 2018-05-05 VITALS
SYSTOLIC BLOOD PRESSURE: 155 MMHG | DIASTOLIC BLOOD PRESSURE: 73 MMHG | BODY MASS INDEX: 32.65 KG/M2 | WEIGHT: 208 LBS | HEART RATE: 78 BPM | TEMPERATURE: 97.8 F | RESPIRATION RATE: 16 BRPM | OXYGEN SATURATION: 99 % | HEIGHT: 67 IN

## 2018-05-05 LAB
ABSOLUTE EOS #: 0.1 K/UL (ref 0–0.44)
ABSOLUTE IMMATURE GRANULOCYTE: 0.04 K/UL (ref 0–0.3)
ABSOLUTE LYMPH #: 2.48 K/UL (ref 1.1–3.7)
ABSOLUTE MONO #: 0.76 K/UL (ref 0.1–1.2)
ALBUMIN SERPL-MCNC: 3.3 G/DL (ref 3.5–5.2)
ALBUMIN SERPL-MCNC: 3.5 G/DL (ref 3.5–5.2)
ALBUMIN/GLOBULIN RATIO: 1.3 (ref 1–2.5)
ALBUMIN/GLOBULIN RATIO: 1.4 (ref 1–2.5)
ALP BLD-CCNC: 66 U/L (ref 40–129)
ALP BLD-CCNC: 79 U/L (ref 40–129)
ALT SERPL-CCNC: 5 U/L (ref 5–41)
ALT SERPL-CCNC: 7 U/L (ref 5–41)
ANION GAP SERPL CALCULATED.3IONS-SCNC: 12 MMOL/L (ref 9–17)
ANION GAP SERPL CALCULATED.3IONS-SCNC: 9 MMOL/L (ref 9–17)
AST SERPL-CCNC: 11 U/L
AST SERPL-CCNC: 12 U/L
BASOPHILS # BLD: 0 % (ref 0–2)
BASOPHILS ABSOLUTE: 0.03 K/UL (ref 0–0.2)
BILIRUB SERPL-MCNC: 0.39 MG/DL (ref 0.3–1.2)
BILIRUB SERPL-MCNC: 0.4 MG/DL (ref 0.3–1.2)
BUN BLDV-MCNC: 37 MG/DL (ref 8–23)
BUN BLDV-MCNC: 42 MG/DL (ref 8–23)
BUN/CREAT BLD: 21 (ref 9–20)
BUN/CREAT BLD: 26 (ref 9–20)
CALCIUM SERPL-MCNC: 8.4 MG/DL (ref 8.6–10.4)
CALCIUM SERPL-MCNC: 9.2 MG/DL (ref 8.6–10.4)
CHLORIDE BLD-SCNC: 106 MMOL/L (ref 98–107)
CHLORIDE BLD-SCNC: 108 MMOL/L (ref 98–107)
CO2: 22 MMOL/L (ref 20–31)
CO2: 23 MMOL/L (ref 20–31)
CREAT SERPL-MCNC: 1.6 MG/DL (ref 0.7–1.2)
CREAT SERPL-MCNC: 1.73 MG/DL (ref 0.7–1.2)
DIFFERENTIAL TYPE: ABNORMAL
EOSINOPHILS RELATIVE PERCENT: 1 % (ref 1–4)
GFR AFRICAN AMERICAN: 46 ML/MIN
GFR AFRICAN AMERICAN: 50 ML/MIN
GFR NON-AFRICAN AMERICAN: 38 ML/MIN
GFR NON-AFRICAN AMERICAN: 41 ML/MIN
GFR SERPL CREATININE-BSD FRML MDRD: ABNORMAL ML/MIN/{1.73_M2}
GLUCOSE BLD-MCNC: 149 MG/DL (ref 70–99)
GLUCOSE BLD-MCNC: 77 MG/DL (ref 74–100)
GLUCOSE BLD-MCNC: 82 MG/DL (ref 70–99)
HCT VFR BLD CALC: 31.8 % (ref 40.7–50.3)
HCT VFR BLD CALC: 34.6 % (ref 40.7–50.3)
HEMOGLOBIN: 10.1 G/DL (ref 13–17)
HEMOGLOBIN: 9.5 G/DL (ref 13–17)
IMMATURE GRANULOCYTES: 1 %
LYMPHOCYTES # BLD: 29 % (ref 24–43)
MCH RBC QN AUTO: 24.2 PG (ref 25.2–33.5)
MCH RBC QN AUTO: 24.8 PG (ref 25.2–33.5)
MCHC RBC AUTO-ENTMCNC: 29.2 G/DL (ref 28.4–34.8)
MCHC RBC AUTO-ENTMCNC: 29.9 G/DL (ref 28.4–34.8)
MCV RBC AUTO: 83 FL (ref 82.6–102.9)
MCV RBC AUTO: 83 FL (ref 82.6–102.9)
MONOCYTES # BLD: 9 % (ref 3–12)
NRBC AUTOMATED: 0.2 PER 100 WBC
NRBC AUTOMATED: 0.3 PER 100 WBC
PDW BLD-RTO: 17.2 % (ref 11.8–14.4)
PDW BLD-RTO: 17.4 % (ref 11.8–14.4)
PLATELET # BLD: 260 K/UL (ref 138–453)
PLATELET # BLD: 277 K/UL (ref 138–453)
PLATELET ESTIMATE: ABNORMAL
PMV BLD AUTO: 9.1 FL (ref 8.1–13.5)
PMV BLD AUTO: 9.3 FL (ref 8.1–13.5)
POTASSIUM SERPL-SCNC: 4.7 MMOL/L (ref 3.7–5.3)
POTASSIUM SERPL-SCNC: 4.8 MMOL/L (ref 3.7–5.3)
RBC # BLD: 3.83 M/UL (ref 4.21–5.77)
RBC # BLD: 4.17 M/UL (ref 4.21–5.77)
RBC # BLD: ABNORMAL 10*6/UL
SEG NEUTROPHILS: 60 % (ref 36–65)
SEGMENTED NEUTROPHILS ABSOLUTE COUNT: 5.07 K/UL (ref 1.5–8.1)
SODIUM BLD-SCNC: 140 MMOL/L (ref 135–144)
SODIUM BLD-SCNC: 140 MMOL/L (ref 135–144)
TOTAL PROTEIN: 5.8 G/DL (ref 6.4–8.3)
TOTAL PROTEIN: 6 G/DL (ref 6.4–8.3)
WBC # BLD: 8.5 K/UL (ref 3.5–11.3)
WBC # BLD: 9.1 K/UL (ref 3.5–11.3)
WBC # BLD: ABNORMAL 10*3/UL

## 2018-05-05 PROCEDURE — 80053 COMPREHEN METABOLIC PANEL: CPT

## 2018-05-05 PROCEDURE — 6370000000 HC RX 637 (ALT 250 FOR IP): Performed by: INTERNAL MEDICINE

## 2018-05-05 PROCEDURE — 6360000002 HC RX W HCPCS: Performed by: PHYSICIAN ASSISTANT

## 2018-05-05 PROCEDURE — C9113 INJ PANTOPRAZOLE SODIUM, VIA: HCPCS | Performed by: PHYSICIAN ASSISTANT

## 2018-05-05 PROCEDURE — 85027 COMPLETE CBC AUTOMATED: CPT

## 2018-05-05 PROCEDURE — 82947 ASSAY GLUCOSE BLOOD QUANT: CPT

## 2018-05-05 PROCEDURE — 94640 AIRWAY INHALATION TREATMENT: CPT

## 2018-05-05 PROCEDURE — 36415 COLL VENOUS BLD VENIPUNCTURE: CPT

## 2018-05-05 PROCEDURE — 85025 COMPLETE CBC W/AUTO DIFF WBC: CPT

## 2018-05-05 RX ORDER — TRAZODONE HYDROCHLORIDE 50 MG/1
50 TABLET ORAL NIGHTLY PRN
Qty: 30 TABLET | Refills: 0 | Status: SHIPPED | OUTPATIENT
Start: 2018-05-05 | End: 2018-11-01

## 2018-05-05 RX ORDER — PNV NO.95/FERROUS FUM/FOLIC AC 28MG-0.8MG
325 TABLET ORAL EVERY OTHER DAY
Qty: 15 TABLET | Refills: 0 | Status: SHIPPED | OUTPATIENT
Start: 2018-05-05 | End: 2018-06-18 | Stop reason: ALTCHOICE

## 2018-05-05 RX ORDER — PANTOPRAZOLE SODIUM 40 MG/1
40 TABLET, DELAYED RELEASE ORAL DAILY
Qty: 30 TABLET | Refills: 11 | Status: SHIPPED | OUTPATIENT
Start: 2018-05-05 | End: 2019-03-28 | Stop reason: SDUPTHER

## 2018-05-05 RX ADMIN — SPIRONOLACTONE 25 MG: 25 TABLET ORAL at 08:57

## 2018-05-05 RX ADMIN — TIOTROPIUM BROMIDE 18 MCG: 18 CAPSULE ORAL; RESPIRATORY (INHALATION) at 09:53

## 2018-05-05 RX ADMIN — PIOGLITAZONE 30 MG: 15 TABLET ORAL at 08:57

## 2018-05-05 RX ADMIN — PANTOPRAZOLE SODIUM 40 MG: 40 INJECTION, POWDER, FOR SOLUTION INTRAVENOUS at 08:57

## 2018-05-05 RX ADMIN — METOPROLOL SUCCINATE 100 MG: 100 TABLET, FILM COATED, EXTENDED RELEASE ORAL at 08:57

## 2018-05-05 ASSESSMENT — PAIN SCALES - GENERAL: PAINLEVEL_OUTOF10: 0

## 2018-05-07 ENCOUNTER — TELEPHONE (OUTPATIENT)
Dept: CARDIOLOGY | Age: 83
End: 2018-05-07

## 2018-05-07 ENCOUNTER — HOSPITAL ENCOUNTER (OUTPATIENT)
Age: 83
Discharge: HOME | End: 2018-05-07
Payer: MEDICARE

## 2018-05-07 DIAGNOSIS — I25.119 CORONARY ARTERY DISEASE INVOLVING NATIVE CORONARY ARTERY OF NATIVE HEART WITH ANGINA PECTORIS (HCC): Chronic | ICD-10-CM

## 2018-05-07 DIAGNOSIS — N18.30 CHRONIC KIDNEY DISEASE, STAGE 3 (HCC): Chronic | ICD-10-CM

## 2018-05-07 LAB
HCT VFR BLD CALC: 34.8 % (ref 40.7–50.3)
HEMOGLOBIN: 10.3 G/DL (ref 13–17)
MCH RBC QN AUTO: 24.5 PG (ref 25.2–33.5)
MCHC RBC AUTO-ENTMCNC: 29.6 G/DL (ref 28.4–34.8)
MCV RBC AUTO: 82.9 FL (ref 82.6–102.9)
NRBC AUTOMATED: 0 PER 100 WBC
PDW BLD-RTO: 18.1 % (ref 11.8–14.4)
PLATELET # BLD: 281 K/UL (ref 138–453)
PMV BLD AUTO: 9.5 FL (ref 8.1–13.5)
RBC # BLD: 4.2 M/UL (ref 4.21–5.77)
WBC # BLD: 8.2 K/UL (ref 3.5–11.3)

## 2018-05-07 PROCEDURE — 85027 COMPLETE CBC AUTOMATED: CPT

## 2018-05-07 PROCEDURE — 85025 COMPLETE CBC W/AUTO DIFF WBC: CPT

## 2018-05-07 PROCEDURE — 36415 COLL VENOUS BLD VENIPUNCTURE: CPT

## 2018-05-07 PROCEDURE — 80053 COMPREHEN METABOLIC PANEL: CPT

## 2018-05-08 ENCOUNTER — TELEPHONE (OUTPATIENT)
Dept: CARDIOLOGY | Age: 83
End: 2018-05-08

## 2018-05-14 ENCOUNTER — HOSPITAL ENCOUNTER (OUTPATIENT)
Dept: LAB | Age: 83
Discharge: HOME | End: 2018-05-14
Payer: MEDICARE

## 2018-05-14 DIAGNOSIS — K92.2 GASTROINTESTINAL HEMORRHAGE, UNSPECIFIED GASTROINTESTINAL HEMORRHAGE TYPE: ICD-10-CM

## 2018-05-14 LAB
ABSOLUTE EOS #: 0.14 K/UL (ref 0–0.44)
ABSOLUTE IMMATURE GRANULOCYTE: 0.05 K/UL (ref 0–0.3)
ABSOLUTE LYMPH #: 2.44 K/UL (ref 1.1–3.7)
ABSOLUTE MONO #: 0.79 K/UL (ref 0.1–1.2)
BASOPHILS # BLD: 0 % (ref 0–2)
BASOPHILS ABSOLUTE: 0.04 K/UL (ref 0–0.2)
DIFFERENTIAL TYPE: ABNORMAL
EOSINOPHILS RELATIVE PERCENT: 2 % (ref 1–4)
HCT VFR BLD CALC: 35.7 % (ref 40.7–50.3)
HEMOGLOBIN: 10.4 G/DL (ref 13–17)
IMMATURE GRANULOCYTES: 1 %
LYMPHOCYTES # BLD: 26 % (ref 24–43)
MCH RBC QN AUTO: 24.2 PG (ref 25.2–33.5)
MCHC RBC AUTO-ENTMCNC: 29.1 G/DL (ref 28.4–34.8)
MCV RBC AUTO: 83.2 FL (ref 82.6–102.9)
MONOCYTES # BLD: 8 % (ref 3–12)
NRBC AUTOMATED: 0 PER 100 WBC
PDW BLD-RTO: 18.7 % (ref 11.8–14.4)
PLATELET # BLD: 216 K/UL (ref 138–453)
PLATELET ESTIMATE: ABNORMAL
PMV BLD AUTO: 9.6 FL (ref 8.1–13.5)
RBC # BLD: 4.29 M/UL (ref 4.21–5.77)
RBC # BLD: ABNORMAL 10*6/UL
SEG NEUTROPHILS: 63 % (ref 36–65)
SEGMENTED NEUTROPHILS ABSOLUTE COUNT: 6.04 K/UL (ref 1.5–8.1)
WBC # BLD: 9.5 K/UL (ref 3.5–11.3)
WBC # BLD: ABNORMAL 10*3/UL

## 2018-05-14 PROCEDURE — 85025 COMPLETE CBC W/AUTO DIFF WBC: CPT

## 2018-05-14 PROCEDURE — 36415 COLL VENOUS BLD VENIPUNCTURE: CPT

## 2018-05-21 ENCOUNTER — HOSPITAL ENCOUNTER (OUTPATIENT)
Age: 83
Discharge: HOME | End: 2018-05-21
Payer: MEDICARE

## 2018-05-21 DIAGNOSIS — D62 ANEMIA DUE TO BLOOD LOSS, ACUTE: ICD-10-CM

## 2018-05-21 LAB
HCT VFR BLD CALC: 34.9 % (ref 40.7–50.3)
HEMOGLOBIN: 10.1 G/DL (ref 13–17)
MCH RBC QN AUTO: 23.9 PG (ref 25.2–33.5)
MCHC RBC AUTO-ENTMCNC: 28.9 G/DL (ref 28.4–34.8)
MCV RBC AUTO: 82.5 FL (ref 82.6–102.9)
NRBC AUTOMATED: 0 PER 100 WBC
PDW BLD-RTO: 17.8 % (ref 11.8–14.4)
PLATELET # BLD: 219 K/UL (ref 138–453)
PMV BLD AUTO: 9.7 FL (ref 8.1–13.5)
RBC # BLD: 4.23 M/UL (ref 4.21–5.77)
WBC # BLD: 7 K/UL (ref 3.5–11.3)

## 2018-05-21 PROCEDURE — 36415 COLL VENOUS BLD VENIPUNCTURE: CPT

## 2018-05-21 PROCEDURE — 85027 COMPLETE CBC AUTOMATED: CPT

## 2018-05-23 ENCOUNTER — OFFICE VISIT (OUTPATIENT)
Dept: CARDIOLOGY | Age: 83
End: 2018-05-23
Payer: MEDICARE

## 2018-05-23 VITALS
HEIGHT: 67 IN | HEART RATE: 88 BPM | DIASTOLIC BLOOD PRESSURE: 70 MMHG | SYSTOLIC BLOOD PRESSURE: 133 MMHG | BODY MASS INDEX: 31.36 KG/M2 | OXYGEN SATURATION: 98 % | WEIGHT: 199.8 LBS | RESPIRATION RATE: 16 BRPM

## 2018-05-23 DIAGNOSIS — D62 ANEMIA DUE TO BLOOD LOSS, ACUTE: ICD-10-CM

## 2018-05-23 DIAGNOSIS — Z95.1 S/P CABG (CORONARY ARTERY BYPASS GRAFT): Chronic | ICD-10-CM

## 2018-05-23 DIAGNOSIS — I48.20 CHRONIC ATRIAL FIBRILLATION (HCC): Primary | Chronic | ICD-10-CM

## 2018-05-23 DIAGNOSIS — E78.2 MIXED HYPERLIPIDEMIA: ICD-10-CM

## 2018-05-23 DIAGNOSIS — R06.02 SOB (SHORTNESS OF BREATH): ICD-10-CM

## 2018-05-23 DIAGNOSIS — I25.10 ASHD (ARTERIOSCLEROTIC HEART DISEASE): ICD-10-CM

## 2018-05-23 DIAGNOSIS — I50.32 CHRONIC DIASTOLIC CHF (CONGESTIVE HEART FAILURE) (HCC): Chronic | ICD-10-CM

## 2018-05-23 LAB
ABO/RH: NORMAL
ANTIBODY SCREEN: NEGATIVE
ARM BAND NUMBER: NORMAL
BLD PROD TYP BPU: NORMAL
CROSSMATCH RESULT: NORMAL
DISPENSE STATUS BLOOD BANK: NORMAL
EXPIRATION DATE: NORMAL
TRANSFUSION STATUS: NORMAL
UNIT DIVISION: 0
UNIT NUMBER: NORMAL

## 2018-05-23 PROCEDURE — 4040F PNEUMOC VAC/ADMIN/RCVD: CPT | Performed by: INTERNAL MEDICINE

## 2018-05-23 PROCEDURE — 99214 OFFICE O/P EST MOD 30 MIN: CPT | Performed by: INTERNAL MEDICINE

## 2018-05-23 PROCEDURE — 1111F DSCHRG MED/CURRENT MED MERGE: CPT | Performed by: INTERNAL MEDICINE

## 2018-05-23 PROCEDURE — 1123F ACP DISCUSS/DSCN MKR DOCD: CPT | Performed by: INTERNAL MEDICINE

## 2018-05-23 PROCEDURE — 1036F TOBACCO NON-USER: CPT | Performed by: INTERNAL MEDICINE

## 2018-05-23 PROCEDURE — G8427 DOCREV CUR MEDS BY ELIG CLIN: HCPCS | Performed by: INTERNAL MEDICINE

## 2018-05-23 PROCEDURE — G8417 CALC BMI ABV UP PARAM F/U: HCPCS | Performed by: INTERNAL MEDICINE

## 2018-05-23 PROCEDURE — G8598 ASA/ANTIPLAT THER USED: HCPCS | Performed by: INTERNAL MEDICINE

## 2018-05-23 RX ORDER — GLIMEPIRIDE 2 MG/1
2 TABLET ORAL 2 TIMES DAILY
COMMUNITY
Start: 2018-05-15

## 2018-05-23 ASSESSMENT — ENCOUNTER SYMPTOMS
BLURRED VISION: 0
ABDOMINAL PAIN: 0
HEMOPTYSIS: 0
BACK PAIN: 1
SHORTNESS OF BREATH: 1
ORTHOPNEA: 0
EYE DISCHARGE: 0
SPUTUM PRODUCTION: 0
HEARTBURN: 0
DOUBLE VISION: 0
DIARRHEA: 0
CONSTIPATION: 0
NAUSEA: 0
EYE PAIN: 0
COUGH: 0
VOMITING: 0
PHOTOPHOBIA: 0

## 2018-06-18 ENCOUNTER — OFFICE VISIT (OUTPATIENT)
Dept: CARDIOLOGY | Age: 83
End: 2018-06-18
Payer: MEDICARE

## 2018-06-18 ENCOUNTER — TELEPHONE (OUTPATIENT)
Dept: CARDIOLOGY | Age: 83
End: 2018-06-18

## 2018-06-18 VITALS
HEIGHT: 67 IN | DIASTOLIC BLOOD PRESSURE: 67 MMHG | BODY MASS INDEX: 30.61 KG/M2 | OXYGEN SATURATION: 98 % | WEIGHT: 195 LBS | RESPIRATION RATE: 16 BRPM | HEART RATE: 79 BPM | SYSTOLIC BLOOD PRESSURE: 137 MMHG

## 2018-06-18 DIAGNOSIS — E78.2 MIXED HYPERLIPIDEMIA: ICD-10-CM

## 2018-06-18 DIAGNOSIS — I50.32 CHRONIC DIASTOLIC CHF (CONGESTIVE HEART FAILURE) (HCC): Chronic | ICD-10-CM

## 2018-06-18 DIAGNOSIS — D62 ANEMIA DUE TO BLOOD LOSS, ACUTE: ICD-10-CM

## 2018-06-18 DIAGNOSIS — I48.20 CHRONIC A-FIB (HCC): Primary | ICD-10-CM

## 2018-06-18 DIAGNOSIS — Z95.1 S/P CABG (CORONARY ARTERY BYPASS GRAFT): Chronic | ICD-10-CM

## 2018-06-18 PROCEDURE — 4040F PNEUMOC VAC/ADMIN/RCVD: CPT | Performed by: INTERNAL MEDICINE

## 2018-06-18 PROCEDURE — G8427 DOCREV CUR MEDS BY ELIG CLIN: HCPCS | Performed by: INTERNAL MEDICINE

## 2018-06-18 PROCEDURE — 1123F ACP DISCUSS/DSCN MKR DOCD: CPT | Performed by: INTERNAL MEDICINE

## 2018-06-18 PROCEDURE — G8598 ASA/ANTIPLAT THER USED: HCPCS | Performed by: INTERNAL MEDICINE

## 2018-06-18 PROCEDURE — G8417 CALC BMI ABV UP PARAM F/U: HCPCS | Performed by: INTERNAL MEDICINE

## 2018-06-18 PROCEDURE — 93294 REM INTERROG EVL PM/LDLS PM: CPT | Performed by: FAMILY MEDICINE

## 2018-06-18 PROCEDURE — 1036F TOBACCO NON-USER: CPT | Performed by: INTERNAL MEDICINE

## 2018-06-18 PROCEDURE — 99213 OFFICE O/P EST LOW 20 MIN: CPT | Performed by: INTERNAL MEDICINE

## 2018-06-18 PROCEDURE — 93296 REM INTERROG EVL PM/IDS: CPT | Performed by: FAMILY MEDICINE

## 2018-06-18 RX ORDER — PREDNISONE 1 MG/1
TABLET ORAL
Refills: 5 | COMMUNITY
Start: 2018-06-04 | End: 2019-05-07 | Stop reason: ALTCHOICE

## 2018-06-18 RX ORDER — PREDNISONE 1 MG/1
TABLET ORAL
Refills: 5 | COMMUNITY
Start: 2018-06-04 | End: 2018-11-01 | Stop reason: SDUPTHER

## 2018-06-18 RX ORDER — ASPIRIN 325 MG
325 TABLET, DELAYED RELEASE (ENTERIC COATED) ORAL DAILY
Qty: 30 TABLET | Refills: 3 | Status: SHIPPED | OUTPATIENT
Start: 2018-06-18 | End: 2018-11-01 | Stop reason: ALTCHOICE

## 2018-06-18 RX ORDER — ACETAMINOPHEN AND CODEINE PHOSPHATE 300; 30 MG/1; MG/1
TABLET ORAL
Refills: 0 | COMMUNITY
Start: 2018-06-14 | End: 2021-05-24

## 2018-06-18 ASSESSMENT — ENCOUNTER SYMPTOMS
HEMOPTYSIS: 0
BLURRED VISION: 0
SHORTNESS OF BREATH: 1
PHOTOPHOBIA: 0
ORTHOPNEA: 0
EYE PAIN: 0
CONSTIPATION: 0
HEARTBURN: 0
DOUBLE VISION: 0
VOMITING: 0
DIARRHEA: 0
NAUSEA: 0
SPUTUM PRODUCTION: 0
EYE DISCHARGE: 0
COUGH: 0
BACK PAIN: 1
ABDOMINAL PAIN: 0

## 2018-06-28 ENCOUNTER — NURSE ONLY (OUTPATIENT)
Dept: CARDIOLOGY | Age: 83
End: 2018-06-28
Payer: MEDICARE

## 2018-06-28 DIAGNOSIS — Z95.0 CARDIAC PACEMAKER IN SITU: Primary | ICD-10-CM

## 2018-06-28 DIAGNOSIS — I49.5 TACHY-BRADY SYNDROME (HCC): ICD-10-CM

## 2018-07-24 RX ORDER — SPIRONOLACTONE 25 MG/1
25 TABLET ORAL DAILY
Qty: 90 TABLET | Refills: 3 | Status: SHIPPED | OUTPATIENT
Start: 2018-07-24 | End: 2019-03-14 | Stop reason: ALTCHOICE

## 2018-09-13 ENCOUNTER — TELEPHONE (OUTPATIENT)
Dept: CARDIOLOGY | Age: 83
End: 2018-09-13

## 2018-09-13 ENCOUNTER — NURSE ONLY (OUTPATIENT)
Dept: CARDIOLOGY | Age: 83
End: 2018-09-13
Payer: MEDICARE

## 2018-09-13 DIAGNOSIS — Z95.0 PACEMAKER: ICD-10-CM

## 2018-09-13 DIAGNOSIS — I49.5 TACHY-BRADY SYNDROME (HCC): Primary | ICD-10-CM

## 2018-09-13 NOTE — TELEPHONE ENCOUNTER
Called patient to remind him of his home check and he has stated that he would send it over today. He also stated that he had a stomach ulcers back in April 2018 and stated that he wanted to know if Xarelto could be restarted. Spoke with Dr Zachariah Borges and Dr Zachariah Borges had stated that this would be up to his GI Doctor. Let pt know this information and pt stated he will call Dr. Mani Lucio in [place] to address this and will keep up posted.

## 2018-09-21 ENCOUNTER — TELEPHONE (OUTPATIENT)
Dept: CARDIOLOGY | Age: 83
End: 2018-09-21

## 2018-09-21 PROCEDURE — 93296 REM INTERROG EVL PM/IDS: CPT | Performed by: FAMILY MEDICINE

## 2018-09-21 PROCEDURE — 93295 DEV INTERROG REMOTE 1/2/MLT: CPT | Performed by: FAMILY MEDICINE

## 2018-10-15 RX ORDER — METOPROLOL SUCCINATE 100 MG/1
100 TABLET, EXTENDED RELEASE ORAL 2 TIMES DAILY
Qty: 180 TABLET | Refills: 3 | Status: SHIPPED | OUTPATIENT
Start: 2018-10-15 | End: 2019-11-29 | Stop reason: SDUPTHER

## 2018-10-24 ENCOUNTER — HOSPITAL ENCOUNTER (OUTPATIENT)
Age: 83
Discharge: HOME | End: 2018-10-24
Payer: MEDICARE

## 2018-10-24 ENCOUNTER — HOSPITAL ENCOUNTER (OUTPATIENT)
Age: 83
Discharge: HOME | End: 2018-10-26
Payer: MEDICARE

## 2018-10-24 ENCOUNTER — OFFICE VISIT (OUTPATIENT)
Dept: CARDIOLOGY | Age: 83
End: 2018-10-24
Payer: MEDICARE

## 2018-10-24 ENCOUNTER — HOSPITAL ENCOUNTER (OUTPATIENT)
Dept: GENERAL RADIOLOGY | Age: 83
Discharge: HOME | End: 2018-10-26
Payer: MEDICARE

## 2018-10-24 VITALS
WEIGHT: 203.8 LBS | OXYGEN SATURATION: 97 % | DIASTOLIC BLOOD PRESSURE: 76 MMHG | RESPIRATION RATE: 16 BRPM | HEIGHT: 67 IN | SYSTOLIC BLOOD PRESSURE: 135 MMHG | HEART RATE: 84 BPM | BODY MASS INDEX: 31.99 KG/M2

## 2018-10-24 DIAGNOSIS — N18.4 CHRONIC KIDNEY DISEASE, STAGE IV (SEVERE) (HCC): ICD-10-CM

## 2018-10-24 DIAGNOSIS — I48.20 CHRONIC ATRIAL FIBRILLATION (HCC): Chronic | ICD-10-CM

## 2018-10-24 DIAGNOSIS — I50.32 CHRONIC DIASTOLIC CHF (CONGESTIVE HEART FAILURE) (HCC): Chronic | ICD-10-CM

## 2018-10-24 DIAGNOSIS — Z95.0 PACEMAKER: Chronic | ICD-10-CM

## 2018-10-24 DIAGNOSIS — I25.10 CORONARY ARTERY DISEASE INVOLVING NATIVE CORONARY ARTERY OF NATIVE HEART WITHOUT ANGINA PECTORIS: Chronic | ICD-10-CM

## 2018-10-24 DIAGNOSIS — R53.82 CHRONIC FATIGUE: ICD-10-CM

## 2018-10-24 DIAGNOSIS — R06.02 SOB (SHORTNESS OF BREATH): ICD-10-CM

## 2018-10-24 DIAGNOSIS — R53.83 OTHER FATIGUE: ICD-10-CM

## 2018-10-24 DIAGNOSIS — I48.20 CHRONIC ATRIAL FIBRILLATION (HCC): Primary | Chronic | ICD-10-CM

## 2018-10-24 DIAGNOSIS — Z95.1 S/P CABG (CORONARY ARTERY BYPASS GRAFT): Chronic | ICD-10-CM

## 2018-10-24 LAB
ANION GAP SERPL CALCULATED.3IONS-SCNC: 12 MMOL/L (ref 9–17)
BNP INTERPRETATION: ABNORMAL
BUN BLDV-MCNC: 32 MG/DL (ref 8–23)
BUN/CREAT BLD: 16 (ref 9–20)
C-REACTIVE PROTEIN: 24.4 MG/L (ref 0–5)
CALCIUM SERPL-MCNC: 9.1 MG/DL (ref 8.6–10.4)
CHLORIDE BLD-SCNC: 100 MMOL/L (ref 98–107)
CO2: 30 MMOL/L (ref 20–31)
CREAT SERPL-MCNC: 2 MG/DL (ref 0.7–1.2)
GFR AFRICAN AMERICAN: 39 ML/MIN
GFR NON-AFRICAN AMERICAN: 32 ML/MIN
GFR SERPL CREATININE-BSD FRML MDRD: ABNORMAL ML/MIN/{1.73_M2}
GFR SERPL CREATININE-BSD FRML MDRD: ABNORMAL ML/MIN/{1.73_M2}
GLUCOSE BLD-MCNC: 156 MG/DL (ref 70–99)
HCT VFR BLD CALC: 37.8 % (ref 40.7–50.3)
HEMOGLOBIN: 10.9 G/DL (ref 13–17)
MCH RBC QN AUTO: 25.4 PG (ref 25.2–33.5)
MCHC RBC AUTO-ENTMCNC: 28.8 G/DL (ref 28.4–34.8)
MCV RBC AUTO: 88.1 FL (ref 82.6–102.9)
NRBC AUTOMATED: 0 PER 100 WBC
PDW BLD-RTO: 16.5 % (ref 11.8–14.4)
PLATELET # BLD: 206 K/UL (ref 138–453)
PMV BLD AUTO: 9.7 FL (ref 8.1–13.5)
POTASSIUM SERPL-SCNC: 4.7 MMOL/L (ref 3.7–5.3)
PRO-BNP: 1728 PG/ML
RBC # BLD: 4.29 M/UL (ref 4.21–5.77)
SEDIMENTATION RATE, ERYTHROCYTE: 37 MM (ref 0–20)
SODIUM BLD-SCNC: 142 MMOL/L (ref 135–144)
TSH SERPL DL<=0.05 MIU/L-ACNC: 1.66 MIU/L (ref 0.3–5)
WBC # BLD: 7.1 K/UL (ref 3.5–11.3)

## 2018-10-24 PROCEDURE — 93000 ELECTROCARDIOGRAM COMPLETE: CPT | Performed by: INTERNAL MEDICINE

## 2018-10-24 PROCEDURE — G8598 ASA/ANTIPLAT THER USED: HCPCS | Performed by: INTERNAL MEDICINE

## 2018-10-24 PROCEDURE — 1123F ACP DISCUSS/DSCN MKR DOCD: CPT | Performed by: INTERNAL MEDICINE

## 2018-10-24 PROCEDURE — 85027 COMPLETE CBC AUTOMATED: CPT

## 2018-10-24 PROCEDURE — 1101F PT FALLS ASSESS-DOCD LE1/YR: CPT | Performed by: INTERNAL MEDICINE

## 2018-10-24 PROCEDURE — 36415 COLL VENOUS BLD VENIPUNCTURE: CPT

## 2018-10-24 PROCEDURE — 85651 RBC SED RATE NONAUTOMATED: CPT

## 2018-10-24 PROCEDURE — 71046 X-RAY EXAM CHEST 2 VIEWS: CPT

## 2018-10-24 PROCEDURE — 4040F PNEUMOC VAC/ADMIN/RCVD: CPT | Performed by: INTERNAL MEDICINE

## 2018-10-24 PROCEDURE — 86140 C-REACTIVE PROTEIN: CPT

## 2018-10-24 PROCEDURE — 84443 ASSAY THYROID STIM HORMONE: CPT

## 2018-10-24 PROCEDURE — G8427 DOCREV CUR MEDS BY ELIG CLIN: HCPCS | Performed by: INTERNAL MEDICINE

## 2018-10-24 PROCEDURE — 1036F TOBACCO NON-USER: CPT | Performed by: INTERNAL MEDICINE

## 2018-10-24 PROCEDURE — 99214 OFFICE O/P EST MOD 30 MIN: CPT | Performed by: INTERNAL MEDICINE

## 2018-10-24 PROCEDURE — G8484 FLU IMMUNIZE NO ADMIN: HCPCS | Performed by: INTERNAL MEDICINE

## 2018-10-24 PROCEDURE — 80048 BASIC METABOLIC PNL TOTAL CA: CPT

## 2018-10-24 PROCEDURE — 83880 ASSAY OF NATRIURETIC PEPTIDE: CPT

## 2018-10-24 PROCEDURE — G8417 CALC BMI ABV UP PARAM F/U: HCPCS | Performed by: INTERNAL MEDICINE

## 2018-10-24 ASSESSMENT — ENCOUNTER SYMPTOMS
HEMOPTYSIS: 0
HEARTBURN: 0
DOUBLE VISION: 0
SPUTUM PRODUCTION: 0
EYE PAIN: 0
ABDOMINAL PAIN: 0
CONSTIPATION: 0
BLURRED VISION: 0
COUGH: 0
VOMITING: 0
SHORTNESS OF BREATH: 1
DIARRHEA: 0
PHOTOPHOBIA: 0
BACK PAIN: 1
NAUSEA: 0
EYE DISCHARGE: 0
ORTHOPNEA: 0

## 2018-10-24 NOTE — PATIENT INSTRUCTIONS
SURVEY:    You may be receiving a survey from [employer] regarding your visit today. Please complete the survey to enable us to provide the highest quality of care to you and your family. If you cannot score us a very good on any question, please call the office to discuss how we could have made your experience a very good one. Thank you.

## 2018-10-24 NOTE — PROGRESS NOTES
(ACTOS) 30 MG tablet Take 30 mg by mouth daily      Multiple Vitamins-Minerals (CENTRUM SILVER) TABS Take by mouth daily         Review of Systems   Constitutional: Positive for malaise/fatigue. Negative for chills, diaphoresis, fever and weight loss. HENT: Negative for ear pain, hearing loss, nosebleeds and tinnitus. Eyes: Negative for blurred vision, double vision, photophobia, pain and discharge. Respiratory: Positive for shortness of breath. Negative for cough, hemoptysis and sputum production. Cardiovascular: Negative for chest pain, palpitations, orthopnea, claudication, leg swelling and PND. Gastrointestinal: Negative for abdominal pain, constipation, diarrhea, heartburn, nausea and vomiting. Genitourinary: Negative. Musculoskeletal: Positive for back pain and joint pain. Negative for falls, myalgias and neck pain. Better after starting pain medication and prednisone. Skin: Negative for itching and rash. Neurological: Negative for dizziness, speech change, focal weakness, loss of consciousness, weakness and headaches. Endo/Heme/Allergies: Negative for environmental allergies and polydipsia. Does not bruise/bleed easily. Psychiatric/Behavioral: Negative. Objective:     PHYSICAL EXAM:      VITALS:    /76 (Site: Right Upper Arm, Position: Sitting, Cuff Size: Medium Adult)   Pulse 84   Resp 16   Ht 5' 7\" (1.702 m)   Wt 203 lb 12.8 oz (92.4 kg)   SpO2 97%   BMI 31.92 kg/m²   CONSTITUTIONAL: Cooperative, no apparent distress, and appears well nourished / developed. NEUROLOGIC:  Awake and orientated to person, place and time. PSYCH: Calm affect. SKIN: Warm and dry. HEENT:  normocephalic, neck supple, no elevation of JVP, normal carotid pulses with no bruits and thyroid normal size. LUNGS:  No increased work of breathing and clear to auscultation, no crackles or wheezing. Cardiovascular: Normal rate, regular rhythm, normal heart sounds.  Exam reveals no

## 2018-10-26 ENCOUNTER — TELEPHONE (OUTPATIENT)
Dept: CARDIOLOGY | Age: 83
End: 2018-10-26

## 2018-10-29 ENCOUNTER — TELEPHONE (OUTPATIENT)
Dept: CARDIOLOGY | Age: 83
End: 2018-10-29

## 2018-11-01 ENCOUNTER — OFFICE VISIT (OUTPATIENT)
Dept: CARDIOLOGY | Age: 83
End: 2018-11-01
Payer: MEDICARE

## 2018-11-01 VITALS
HEIGHT: 67 IN | SYSTOLIC BLOOD PRESSURE: 139 MMHG | OXYGEN SATURATION: 96 % | HEART RATE: 76 BPM | WEIGHT: 204.4 LBS | DIASTOLIC BLOOD PRESSURE: 65 MMHG | BODY MASS INDEX: 32.08 KG/M2

## 2018-11-01 DIAGNOSIS — Z95.0 PACEMAKER: Chronic | ICD-10-CM

## 2018-11-01 DIAGNOSIS — I48.20 CHRONIC A-FIB (HCC): Primary | ICD-10-CM

## 2018-11-01 DIAGNOSIS — Z95.1 S/P CABG (CORONARY ARTERY BYPASS GRAFT): Chronic | ICD-10-CM

## 2018-11-01 DIAGNOSIS — I50.32 CHRONIC DIASTOLIC CHF (CONGESTIVE HEART FAILURE) (HCC): Chronic | ICD-10-CM

## 2018-11-01 DIAGNOSIS — N18.30 CKD (CHRONIC KIDNEY DISEASE), STAGE III (HCC): ICD-10-CM

## 2018-11-01 DIAGNOSIS — I25.119 CORONARY ARTERY DISEASE INVOLVING NATIVE CORONARY ARTERY OF NATIVE HEART WITH ANGINA PECTORIS (HCC): Chronic | ICD-10-CM

## 2018-11-01 DIAGNOSIS — R53.82 CHRONIC FATIGUE: ICD-10-CM

## 2018-11-01 DIAGNOSIS — E78.2 MIXED HYPERLIPIDEMIA: ICD-10-CM

## 2018-11-01 DIAGNOSIS — K92.2 GASTROINTESTINAL HEMORRHAGE, UNSPECIFIED GASTROINTESTINAL HEMORRHAGE TYPE: ICD-10-CM

## 2018-11-01 PROCEDURE — 99214 OFFICE O/P EST MOD 30 MIN: CPT | Performed by: INTERNAL MEDICINE

## 2018-11-01 PROCEDURE — 1036F TOBACCO NON-USER: CPT | Performed by: INTERNAL MEDICINE

## 2018-11-01 PROCEDURE — 1123F ACP DISCUSS/DSCN MKR DOCD: CPT | Performed by: INTERNAL MEDICINE

## 2018-11-01 PROCEDURE — 1101F PT FALLS ASSESS-DOCD LE1/YR: CPT | Performed by: INTERNAL MEDICINE

## 2018-11-01 PROCEDURE — G8598 ASA/ANTIPLAT THER USED: HCPCS | Performed by: INTERNAL MEDICINE

## 2018-11-01 PROCEDURE — 4040F PNEUMOC VAC/ADMIN/RCVD: CPT | Performed by: INTERNAL MEDICINE

## 2018-11-01 PROCEDURE — G8427 DOCREV CUR MEDS BY ELIG CLIN: HCPCS | Performed by: INTERNAL MEDICINE

## 2018-11-01 PROCEDURE — G8417 CALC BMI ABV UP PARAM F/U: HCPCS | Performed by: INTERNAL MEDICINE

## 2018-11-01 PROCEDURE — G8484 FLU IMMUNIZE NO ADMIN: HCPCS | Performed by: INTERNAL MEDICINE

## 2018-11-01 RX ORDER — ASPIRIN 81 MG/1
81 TABLET ORAL DAILY
COMMUNITY
End: 2020-08-04 | Stop reason: ALTCHOICE

## 2018-11-01 ASSESSMENT — ENCOUNTER SYMPTOMS
SHORTNESS OF BREATH: 1
HEARTBURN: 0
HEMOPTYSIS: 0
SPUTUM PRODUCTION: 0
DOUBLE VISION: 0
NAUSEA: 0
DIARRHEA: 0
PHOTOPHOBIA: 0
BLURRED VISION: 0
VOMITING: 0
EYE DISCHARGE: 0
BACK PAIN: 1
EYE PAIN: 0
COUGH: 0
CONSTIPATION: 0
ABDOMINAL PAIN: 0
ORTHOPNEA: 0

## 2018-11-01 NOTE — PROGRESS NOTES
08/09/2016    Atrial Fibrillation throughout with heart rates ranging between  bpm.  Four 2 second pauses. Occasional PVC's    History of stress test 04/25/2018    Normal. LV systolic function cannot be assessed because of no gating. ECG is uninterpretable at baseline because of demand ventricular pacing. Low risk for significant CAD.  Hypertension     Pacemaker 11/07/2016    [employer]tr[employer]     Polymyalgia rheumatica ([address]      Past Surgical History:  Past Surgical History:   Procedure Laterality Date    ABDOMINAL AORTIC ANEURYSM [place]      CATARACT REMOVAL Bilateral     CHOLECYSTECTOMY      CORONARY ARTERY BYPASS GRAFT  1995    [facility]3 [facility]    KIDNEY REMOVAL Left 2015    [facility]    TONSILLECTOMY AND ADENOIDECTOMY       Social History:    History   Smoking Status    Former Smoker    Packs/day: 1.00    Years: 50.00    Types: Cigarettes, Pipe   Smokeless Tobacco    Never Used     Current Medications:  Outpatient Prescriptions Marked as Taking for the 11/1/18 encounter (Office Visit) with Ashley Solis MD   Medication Sig Dispense Refill    aspirin 81 MG EC tablet Take 81 mg by mouth daily      Furosemide (LASIX PO) Take 40 mg by mouth daily       metoprolol succinate (TOPROL XL) 100 MG extended release tablet Take 1 tablet by mouth 2 times daily 180 tablet 3    spironolactone (ALDACTONE) 25 MG tablet Take 1 tablet by mouth daily 90 tablet 3    acetaminophen-codeine (TYLENOL #3) 300-30 MG per tablet TAKE ONE TABLET BY MOUTH EVERY 6 HOURS AS NEEDED FOR 1 WEEK  0    predniSONE (DELTASONE) 1 MG tablet TAKE UP TO 4 TABLETS BY MOUTH DAILY AS DIRECTED. ..  Pt reports taking 2 mg once daily  5    glimepiride (AMARYL) 2 MG tablet Take 2 mg by mouth daily       pantoprazole (PROTONIX) 40 MG tablet Take 1 tablet by mouth daily 30 tablet 11    Coenzyme Q10 (CO Q-10) 400 MG CAPS Take 400 mg by mouth 2 times daily       Probiotic Product

## 2018-12-10 ENCOUNTER — OFFICE VISIT (OUTPATIENT)
Dept: CARDIOLOGY | Age: 83
End: 2018-12-10
Payer: MEDICARE

## 2018-12-10 VITALS
DIASTOLIC BLOOD PRESSURE: 62 MMHG | SYSTOLIC BLOOD PRESSURE: 135 MMHG | HEART RATE: 80 BPM | WEIGHT: 208.2 LBS | HEIGHT: 67 IN | BODY MASS INDEX: 32.68 KG/M2 | OXYGEN SATURATION: 96 %

## 2018-12-10 DIAGNOSIS — R53.82 CHRONIC FATIGUE: ICD-10-CM

## 2018-12-10 DIAGNOSIS — E78.2 MIXED HYPERLIPIDEMIA: ICD-10-CM

## 2018-12-10 DIAGNOSIS — I25.810 CORONARY ARTERY DISEASE INVOLVING CORONARY BYPASS GRAFT OF NATIVE HEART WITHOUT ANGINA PECTORIS: ICD-10-CM

## 2018-12-10 DIAGNOSIS — I50.32 CHRONIC DIASTOLIC CONGESTIVE HEART FAILURE (HCC): ICD-10-CM

## 2018-12-10 DIAGNOSIS — Z95.0 CARDIAC PACEMAKER IN SITU: ICD-10-CM

## 2018-12-10 DIAGNOSIS — D64.9 ANEMIA, UNSPECIFIED TYPE: ICD-10-CM

## 2018-12-10 DIAGNOSIS — N18.30 CKD (CHRONIC KIDNEY DISEASE), STAGE III (HCC): ICD-10-CM

## 2018-12-10 DIAGNOSIS — I48.20 CHRONIC ATRIAL FIBRILLATION (HCC): Primary | ICD-10-CM

## 2018-12-10 PROCEDURE — 99214 OFFICE O/P EST MOD 30 MIN: CPT | Performed by: INTERNAL MEDICINE

## 2018-12-10 PROCEDURE — G8427 DOCREV CUR MEDS BY ELIG CLIN: HCPCS | Performed by: INTERNAL MEDICINE

## 2018-12-10 PROCEDURE — G8417 CALC BMI ABV UP PARAM F/U: HCPCS | Performed by: INTERNAL MEDICINE

## 2018-12-10 PROCEDURE — G8598 ASA/ANTIPLAT THER USED: HCPCS | Performed by: INTERNAL MEDICINE

## 2018-12-10 PROCEDURE — 1036F TOBACCO NON-USER: CPT | Performed by: INTERNAL MEDICINE

## 2018-12-10 PROCEDURE — 1101F PT FALLS ASSESS-DOCD LE1/YR: CPT | Performed by: INTERNAL MEDICINE

## 2018-12-10 PROCEDURE — 4040F PNEUMOC VAC/ADMIN/RCVD: CPT | Performed by: INTERNAL MEDICINE

## 2018-12-10 PROCEDURE — 1123F ACP DISCUSS/DSCN MKR DOCD: CPT | Performed by: INTERNAL MEDICINE

## 2018-12-10 PROCEDURE — G8484 FLU IMMUNIZE NO ADMIN: HCPCS | Performed by: INTERNAL MEDICINE

## 2018-12-10 ASSESSMENT — ENCOUNTER SYMPTOMS
PHOTOPHOBIA: 0
EYE PAIN: 0
ORTHOPNEA: 0
EYE DISCHARGE: 0
DOUBLE VISION: 0
BLURRED VISION: 0
HEARTBURN: 0
HEMOPTYSIS: 0
BACK PAIN: 1
DIARRHEA: 0
SPUTUM PRODUCTION: 0
ABDOMINAL PAIN: 0
SHORTNESS OF BREATH: 1
COUGH: 0
CONSTIPATION: 0
NAUSEA: 0
VOMITING: 0

## 2018-12-10 NOTE — PROGRESS NOTES
auscultation, no crackles or wheezing. Cardiovascular: Normal rate, irregularly irregular rhythm, normal heart sounds. Exam reveals no gallop and no friction rubs. No heart murmur heard. ABDOMEN:  Normal bowel sounds, non-distended and non-tender to palpation. + Colostomy  Extremities: No edema. No cyanosis and no clubbing. Pulses are 2+ radial and carotid pulses. 2+ dorsalis pedis and posterior tibial pulses bilaterally. Bilateral leg tenderness. DATA:    Lab Results   Component Value Date    CREATININE 2.00 (H) 10/24/2018    BUN 32 (H) 10/24/2018     10/24/2018    K 4.7 10/24/2018     10/24/2018    CO2 30 10/24/2018         Assessment:      Diagnosis Orders   1. Chronic atrial fibrillation ([facility]     2. Coronary artery disease involving coronary bypass graft of native heart without angina pectoris     3. Chronic diastolic congestive heart failure ([facility]     4. Mixed hyperlipidemia     5. Anemia, unspecified type     6. Cardiac pacemaker in situ     7. Chronic fatigue     8. CKD (chronic kidney disease), stage III ([facility])       Plan:     Chronic Atrial Fibrillation: Rate Control S/P pacemaker placement on 11/07/2016  Beta Blocker: Continue metoprolol succinate (Toprol XL) 100 mg twice a day. Stroke Risk: His CHADS2-VASc score is greater than 2 (2.2% stroke risk)  · Anticoagulation: Unable to tolerate  Xarelto due to history of significant GI bleeding requiring transfusion  He tried it one more time and he has hematuria with it. · His hemoglobin remained low. Repeat blood work today. · I added an iron profile as well. Atherosclerotic Heart Disease: History of CABG in 1995   Antiplatelet Agent: Continue Aspirin 81 mg daily. I also reminded him to watch for signs of blood in his stool or black tarry stools and stop the medication immediately if this develops as this could be life threatening. Diuretics: Continue aldactone 25 mg  once daily and Lasix 40 mg daily.  I also discussed the potential

## 2018-12-10 NOTE — PATIENT INSTRUCTIONS
SURVEY:    You may be receiving a survey from [employer] regarding your visit today. Please complete the survey to enable us to provide the highest quality of care to you and your family. If you cannot score us a very good on any question, please call the office to discuss how we could have made your experience a very good one. Thank you.

## 2019-02-05 PROBLEM — Z86.79 HISTORY OF ABDOMINAL AORTIC ANEURYSM (AAA): Status: ACTIVE | Noted: 2019-02-05

## 2019-02-05 PROBLEM — Z90.6 H/O TOTAL CYSTECTOMY: Status: ACTIVE | Noted: 2019-02-05

## 2019-02-19 ENCOUNTER — HOSPITAL ENCOUNTER (OUTPATIENT)
Age: 84
Discharge: HOME | End: 2019-02-19
Payer: MEDICARE

## 2019-02-19 DIAGNOSIS — N18.30 CHRONIC KIDNEY DISEASE, STAGE 3 (HCC): Chronic | ICD-10-CM

## 2019-02-19 LAB
ALBUMIN SERPL-MCNC: 3.8 G/DL (ref 3.5–5.2)
ANION GAP SERPL CALCULATED.3IONS-SCNC: 12 MMOL/L (ref 9–17)
BUN BLDV-MCNC: 34 MG/DL (ref 8–23)
BUN/CREAT BLD: 16 (ref 9–20)
CALCIUM SERPL-MCNC: 9 MG/DL (ref 8.6–10.4)
CHLORIDE BLD-SCNC: 104 MMOL/L (ref 98–107)
CO2: 25 MMOL/L (ref 20–31)
CREAT SERPL-MCNC: 2.12 MG/DL (ref 0.7–1.2)
CREATININE URINE: 117.6 MG/DL (ref 39–259)
GFR AFRICAN AMERICAN: 36 ML/MIN
GFR NON-AFRICAN AMERICAN: 30 ML/MIN
GFR SERPL CREATININE-BSD FRML MDRD: ABNORMAL ML/MIN/{1.73_M2}
GFR SERPL CREATININE-BSD FRML MDRD: ABNORMAL ML/MIN/{1.73_M2}
GLUCOSE BLD-MCNC: 146 MG/DL (ref 70–99)
HCT VFR BLD CALC: 33.8 % (ref 40.7–50.3)
HEMOGLOBIN: 9.2 G/DL (ref 13–17)
MCH RBC QN AUTO: 21.2 PG (ref 25.2–33.5)
MCHC RBC AUTO-ENTMCNC: 27.2 G/DL (ref 28.4–34.8)
MCV RBC AUTO: 78.1 FL (ref 82.6–102.9)
NRBC AUTOMATED: 0 PER 100 WBC
PDW BLD-RTO: 18.3 % (ref 11.8–14.4)
PHOSPHORUS: 3.1 MG/DL (ref 2.5–4.5)
PLATELET # BLD: 198 K/UL (ref 138–453)
PMV BLD AUTO: 9.4 FL (ref 8.1–13.5)
POTASSIUM SERPL-SCNC: 3.7 MMOL/L (ref 3.7–5.3)
PTH INTACT: 74.97 PG/ML (ref 15–65)
RBC # BLD: 4.33 M/UL (ref 4.21–5.77)
SODIUM BLD-SCNC: 141 MMOL/L (ref 135–144)
TOTAL PROTEIN, URINE: 49 MG/DL
WBC # BLD: 7.6 K/UL (ref 3.5–11.3)

## 2019-02-19 PROCEDURE — 85027 COMPLETE CBC AUTOMATED: CPT

## 2019-02-19 PROCEDURE — 83970 ASSAY OF PARATHORMONE: CPT

## 2019-02-19 PROCEDURE — 84156 ASSAY OF PROTEIN URINE: CPT

## 2019-02-19 PROCEDURE — 36415 COLL VENOUS BLD VENIPUNCTURE: CPT

## 2019-02-19 PROCEDURE — 82570 ASSAY OF URINE CREATININE: CPT

## 2019-02-19 PROCEDURE — 80048 BASIC METABOLIC PNL TOTAL CA: CPT

## 2019-02-19 PROCEDURE — 84100 ASSAY OF PHOSPHORUS: CPT

## 2019-02-19 PROCEDURE — 82040 ASSAY OF SERUM ALBUMIN: CPT

## 2019-03-14 ENCOUNTER — APPOINTMENT (OUTPATIENT)
Dept: GENERAL RADIOLOGY | Age: 84
End: 2019-03-14
Payer: MEDICARE

## 2019-03-14 ENCOUNTER — HOSPITAL ENCOUNTER (EMERGENCY)
Age: 84
Discharge: HOME | End: 2019-03-14
Attending: EMERGENCY MEDICINE
Payer: MEDICARE

## 2019-03-14 VITALS
DIASTOLIC BLOOD PRESSURE: 70 MMHG | SYSTOLIC BLOOD PRESSURE: 137 MMHG | TEMPERATURE: 99 F | HEART RATE: 83 BPM | OXYGEN SATURATION: 97 % | RESPIRATION RATE: 16 BRPM

## 2019-03-14 DIAGNOSIS — J10.1 INFLUENZA A: Primary | ICD-10-CM

## 2019-03-14 DIAGNOSIS — R06.2 WHEEZING: ICD-10-CM

## 2019-03-14 LAB
ABSOLUTE EOS #: 0 K/UL (ref 0–0.44)
ABSOLUTE IMMATURE GRANULOCYTE: 0.05 K/UL (ref 0–0.3)
ABSOLUTE LYMPH #: 1.27 K/UL (ref 1.1–3.7)
ABSOLUTE MONO #: 0.48 K/UL (ref 0.1–1.2)
ANION GAP SERPL CALCULATED.3IONS-SCNC: 14 MMOL/L (ref 9–17)
BASOPHILS # BLD: 1 % (ref 0–2)
BASOPHILS ABSOLUTE: 0.05 K/UL (ref 0–0.2)
BUN BLDV-MCNC: 23 MG/DL (ref 8–23)
BUN/CREAT BLD: 12 (ref 9–20)
CALCIUM SERPL-MCNC: 8.6 MG/DL (ref 8.6–10.4)
CHLORIDE BLD-SCNC: 98 MMOL/L (ref 98–107)
CO2: 23 MMOL/L (ref 20–31)
CREAT SERPL-MCNC: 1.92 MG/DL (ref 0.7–1.2)
DIFFERENTIAL TYPE: ABNORMAL
DIRECT EXAM: ABNORMAL
DIRECT EXAM: ABNORMAL
EOSINOPHILS RELATIVE PERCENT: 0 % (ref 1–4)
GFR AFRICAN AMERICAN: 40 ML/MIN
GFR NON-AFRICAN AMERICAN: 33 ML/MIN
GFR SERPL CREATININE-BSD FRML MDRD: ABNORMAL ML/MIN/{1.73_M2}
GFR SERPL CREATININE-BSD FRML MDRD: ABNORMAL ML/MIN/{1.73_M2}
GLUCOSE BLD-MCNC: 197 MG/DL (ref 70–99)
HCT VFR BLD CALC: 34.5 % (ref 40.7–50.3)
HEMOGLOBIN: 8.9 G/DL (ref 13–17)
IMMATURE GRANULOCYTES: 1 %
LACTIC ACID, WHOLE BLOOD: NORMAL MMOL/L (ref 0.7–2.1)
LACTIC ACID: 1.8 MMOL/L (ref 0.5–2.2)
LYMPHOCYTES # BLD: 24 % (ref 24–43)
Lab: ABNORMAL
MCH RBC QN AUTO: 20.5 PG (ref 25.2–33.5)
MCHC RBC AUTO-ENTMCNC: 25.8 G/DL (ref 28.4–34.8)
MCV RBC AUTO: 79.3 FL (ref 82.6–102.9)
MONOCYTES # BLD: 9 % (ref 3–12)
MORPHOLOGY: ABNORMAL
NRBC AUTOMATED: 0 PER 100 WBC
PDW BLD-RTO: 19.5 % (ref 11.8–14.4)
PLATELET # BLD: 155 K/UL (ref 138–453)
PLATELET ESTIMATE: ABNORMAL
PMV BLD AUTO: 9.7 FL (ref 8.1–13.5)
POTASSIUM SERPL-SCNC: 4.2 MMOL/L (ref 3.7–5.3)
RBC # BLD: 4.35 M/UL (ref 4.21–5.77)
RBC # BLD: ABNORMAL 10*6/UL
SEG NEUTROPHILS: 65 % (ref 36–65)
SEGMENTED NEUTROPHILS ABSOLUTE COUNT: 3.45 K/UL (ref 1.5–8.1)
SODIUM BLD-SCNC: 135 MMOL/L (ref 135–144)
SPECIMEN DESCRIPTION: ABNORMAL
WBC # BLD: 5.3 K/UL (ref 3.5–11.3)
WBC # BLD: ABNORMAL 10*3/UL

## 2019-03-14 PROCEDURE — 71045 X-RAY EXAM CHEST 1 VIEW: CPT

## 2019-03-14 PROCEDURE — 83605 ASSAY OF LACTIC ACID: CPT

## 2019-03-14 PROCEDURE — 80048 BASIC METABOLIC PNL TOTAL CA: CPT

## 2019-03-14 PROCEDURE — 94664 DEMO&/EVAL PT USE INHALER: CPT

## 2019-03-14 PROCEDURE — 36415 COLL VENOUS BLD VENIPUNCTURE: CPT

## 2019-03-14 PROCEDURE — 6370000000 HC RX 637 (ALT 250 FOR IP): Performed by: PHYSICIAN ASSISTANT

## 2019-03-14 PROCEDURE — 85025 COMPLETE CBC W/AUTO DIFF WBC: CPT

## 2019-03-14 PROCEDURE — 99284 EMERGENCY DEPT VISIT MOD MDM: CPT

## 2019-03-14 PROCEDURE — 87804 INFLUENZA ASSAY W/OPTIC: CPT

## 2019-03-14 RX ORDER — ALBUTEROL SULFATE 2.5 MG/3ML
2.5 SOLUTION RESPIRATORY (INHALATION) EVERY 6 HOURS PRN
Qty: 120 EACH | Refills: 3 | Status: SHIPPED | OUTPATIENT
Start: 2019-03-14

## 2019-03-14 RX ORDER — ALBUTEROL SULFATE 90 UG/1
2 AEROSOL, METERED RESPIRATORY (INHALATION) 4 TIMES DAILY PRN
Qty: 1 INHALER | Refills: 0 | Status: SHIPPED | OUTPATIENT
Start: 2019-03-14 | End: 2022-08-25

## 2019-03-14 RX ORDER — IPRATROPIUM BROMIDE AND ALBUTEROL SULFATE 2.5; .5 MG/3ML; MG/3ML
1 SOLUTION RESPIRATORY (INHALATION)
Status: DISCONTINUED | OUTPATIENT
Start: 2019-03-14 | End: 2019-03-14 | Stop reason: HOSPADM

## 2019-03-14 RX ADMIN — IPRATROPIUM BROMIDE AND ALBUTEROL SULFATE 1 AMPULE: .5; 3 SOLUTION RESPIRATORY (INHALATION) at 18:15

## 2019-03-14 ASSESSMENT — PAIN DESCRIPTION - DESCRIPTORS: DESCRIPTORS: DISCOMFORT

## 2019-03-14 ASSESSMENT — PAIN DESCRIPTION - PAIN TYPE: TYPE: ACUTE PAIN

## 2019-03-14 ASSESSMENT — PAIN DESCRIPTION - LOCATION: LOCATION: CHEST

## 2019-03-14 ASSESSMENT — PAIN SCALES - GENERAL: PAINLEVEL_OUTOF10: 3

## 2019-03-14 ASSESSMENT — PAIN DESCRIPTION - FREQUENCY: FREQUENCY: INTERMITTENT

## 2019-04-03 ENCOUNTER — HOSPITAL ENCOUNTER (OUTPATIENT)
Age: 84
Discharge: HOME | End: 2019-04-03
Payer: MEDICARE

## 2019-04-03 DIAGNOSIS — D62 ANEMIA DUE TO BLOOD LOSS, ACUTE: ICD-10-CM

## 2019-04-27 RX ORDER — ALLOPURINOL 100 MG/1
100 TABLET ORAL DAILY
Qty: 90 TABLET | Refills: 3 | Status: SHIPPED | OUTPATIENT
Start: 2019-04-27 | End: 2022-07-26 | Stop reason: ALTCHOICE

## 2019-05-02 ENCOUNTER — HOSPITAL ENCOUNTER (OUTPATIENT)
Age: 84
Discharge: HOME | End: 2019-05-02
Payer: MEDICARE

## 2019-05-02 DIAGNOSIS — N18.30 CHRONIC KIDNEY DISEASE, STAGE 3 (HCC): Chronic | ICD-10-CM

## 2019-05-02 LAB
ALBUMIN SERPL-MCNC: 3.8 G/DL (ref 3.5–5.2)
ANION GAP SERPL CALCULATED.3IONS-SCNC: 11 MMOL/L (ref 9–17)
BUN BLDV-MCNC: 18 MG/DL (ref 8–23)
BUN/CREAT BLD: 12 (ref 9–20)
CALCIUM SERPL-MCNC: 9.3 MG/DL (ref 8.6–10.4)
CHLORIDE BLD-SCNC: 104 MMOL/L (ref 98–107)
CO2: 26 MMOL/L (ref 20–31)
CREAT SERPL-MCNC: 1.46 MG/DL (ref 0.7–1.2)
CREATININE URINE: 28.1 MG/DL (ref 39–259)
GFR AFRICAN AMERICAN: 55 ML/MIN
GFR NON-AFRICAN AMERICAN: 46 ML/MIN
GFR SERPL CREATININE-BSD FRML MDRD: ABNORMAL ML/MIN/{1.73_M2}
GFR SERPL CREATININE-BSD FRML MDRD: ABNORMAL ML/MIN/{1.73_M2}
GLUCOSE BLD-MCNC: 215 MG/DL (ref 70–99)
HCT VFR BLD CALC: 40.8 % (ref 40.7–50.3)
HEMOGLOBIN: 11.3 G/DL (ref 13–17)
MCH RBC QN AUTO: 22.6 PG (ref 25.2–33.5)
MCHC RBC AUTO-ENTMCNC: 27.7 G/DL (ref 28.4–34.8)
MCV RBC AUTO: 81.8 FL (ref 82.6–102.9)
NRBC AUTOMATED: 0 PER 100 WBC
PDW BLD-RTO: 26 % (ref 11.8–14.4)
PHOSPHORUS: 2 MG/DL (ref 2.5–4.5)
PLATELET # BLD: 178 K/UL (ref 138–453)
PMV BLD AUTO: 9.1 FL (ref 8.1–13.5)
POTASSIUM SERPL-SCNC: 4.1 MMOL/L (ref 3.7–5.3)
RBC # BLD: 4.99 M/UL (ref 4.21–5.77)
SODIUM BLD-SCNC: 141 MMOL/L (ref 135–144)
TOTAL PROTEIN, URINE: 27 MG/DL
WBC # BLD: 5.4 K/UL (ref 3.5–11.3)

## 2019-05-02 PROCEDURE — 84156 ASSAY OF PROTEIN URINE: CPT

## 2019-05-02 PROCEDURE — 36415 COLL VENOUS BLD VENIPUNCTURE: CPT

## 2019-05-02 PROCEDURE — 82570 ASSAY OF URINE CREATININE: CPT

## 2019-05-02 PROCEDURE — 83970 ASSAY OF PARATHORMONE: CPT

## 2019-05-02 PROCEDURE — 82040 ASSAY OF SERUM ALBUMIN: CPT

## 2019-05-02 PROCEDURE — 85027 COMPLETE CBC AUTOMATED: CPT

## 2019-05-02 PROCEDURE — 80048 BASIC METABOLIC PNL TOTAL CA: CPT

## 2019-05-02 PROCEDURE — 84100 ASSAY OF PHOSPHORUS: CPT

## 2019-05-03 LAB — PTH INTACT: 66.16 PG/ML (ref 15–65)

## 2019-05-07 PROBLEM — R06.09 DYSPNEA ON EXERTION: Status: ACTIVE | Noted: 2019-05-07

## 2019-05-23 ENCOUNTER — OFFICE VISIT (OUTPATIENT)
Dept: PULMONOLOGY | Age: 84
End: 2019-05-23
Payer: MEDICARE

## 2019-05-23 VITALS
BODY MASS INDEX: 30.13 KG/M2 | HEART RATE: 70 BPM | HEIGHT: 67 IN | TEMPERATURE: 97.4 F | SYSTOLIC BLOOD PRESSURE: 132 MMHG | DIASTOLIC BLOOD PRESSURE: 74 MMHG | WEIGHT: 192 LBS | OXYGEN SATURATION: 98 % | RESPIRATION RATE: 16 BRPM

## 2019-05-23 DIAGNOSIS — R53.81 PHYSICAL DECONDITIONING: ICD-10-CM

## 2019-05-23 DIAGNOSIS — C67.9 MALIGNANT NEOPLASM OF URINARY BLADDER, UNSPECIFIED SITE (HCC): ICD-10-CM

## 2019-05-23 DIAGNOSIS — H35.09: ICD-10-CM

## 2019-05-23 DIAGNOSIS — I70.8: ICD-10-CM

## 2019-05-23 DIAGNOSIS — I50.32 CHRONIC DIASTOLIC CHF (CONGESTIVE HEART FAILURE) (HCC): ICD-10-CM

## 2019-05-23 DIAGNOSIS — I25.119 CORONARY ARTERY DISEASE INVOLVING NATIVE CORONARY ARTERY OF NATIVE HEART WITH ANGINA PECTORIS (HCC): ICD-10-CM

## 2019-05-23 DIAGNOSIS — R09.82 POST-NASAL DRIP: ICD-10-CM

## 2019-05-23 DIAGNOSIS — M35.3 PMR (POLYMYALGIA RHEUMATICA) (HCC): ICD-10-CM

## 2019-05-23 DIAGNOSIS — N18.30 CKD STAGE 3 DUE TO TYPE 2 DIABETES MELLITUS (HCC): ICD-10-CM

## 2019-05-23 DIAGNOSIS — J44.9 STAGE 1 MILD COPD BY GOLD CLASSIFICATION (HCC): ICD-10-CM

## 2019-05-23 DIAGNOSIS — E11.22 CKD STAGE 3 DUE TO TYPE 2 DIABETES MELLITUS (HCC): ICD-10-CM

## 2019-05-23 DIAGNOSIS — I10 ESSENTIAL HYPERTENSION: ICD-10-CM

## 2019-05-23 DIAGNOSIS — Z87.891 STOPPED SMOKING WITH GREATER THAN 40 PACK YEAR HISTORY: ICD-10-CM

## 2019-05-23 DIAGNOSIS — Z95.1 S/P CABG (CORONARY ARTERY BYPASS GRAFT): ICD-10-CM

## 2019-05-23 DIAGNOSIS — R06.09 DYSPNEA ON EXERTION: Primary | ICD-10-CM

## 2019-05-23 PROCEDURE — 1036F TOBACCO NON-USER: CPT | Performed by: INTERNAL MEDICINE

## 2019-05-23 PROCEDURE — G8598 ASA/ANTIPLAT THER USED: HCPCS | Performed by: INTERNAL MEDICINE

## 2019-05-23 PROCEDURE — 1123F ACP DISCUSS/DSCN MKR DOCD: CPT | Performed by: INTERNAL MEDICINE

## 2019-05-23 PROCEDURE — G8427 DOCREV CUR MEDS BY ELIG CLIN: HCPCS | Performed by: INTERNAL MEDICINE

## 2019-05-23 PROCEDURE — 99214 OFFICE O/P EST MOD 30 MIN: CPT | Performed by: INTERNAL MEDICINE

## 2019-05-23 PROCEDURE — 3023F SPIROM DOC REV: CPT | Performed by: INTERNAL MEDICINE

## 2019-05-23 PROCEDURE — G8417 CALC BMI ABV UP PARAM F/U: HCPCS | Performed by: INTERNAL MEDICINE

## 2019-05-23 PROCEDURE — G8926 SPIRO NO PERF OR DOC: HCPCS | Performed by: INTERNAL MEDICINE

## 2019-05-23 PROCEDURE — 4040F PNEUMOC VAC/ADMIN/RCVD: CPT | Performed by: INTERNAL MEDICINE

## 2019-05-23 ASSESSMENT — ENCOUNTER SYMPTOMS
ALLERGIC/IMMUNOLOGIC NEGATIVE: 1
EYES NEGATIVE: 1
SHORTNESS OF BREATH: 1

## 2019-05-23 NOTE — PATIENT INSTRUCTIONS
SURVEY:    You may be receiving a survey from [employer] regarding your visit today. Please complete the survey to enable us to provide the highest quality of care to you and your family. If you cannot score us a very good on any question, please call the office to discuss how we could have made your experience a very good one. Thank you.

## 2019-05-23 NOTE — PROGRESS NOTES
Refill    umeclidinium-vilanterol (ANORO ELLIPTA) 62.5-25 MCG/INH AEPB inhaler Inhale 1 puff into the lungs every morning 1 each 11    torsemide (DEMADEX) 10 MG tablet Take 1 tablet by mouth every other day 90 tablet 3    allopurinol (ZYLOPRIM) 100 MG tablet Take 1 tablet by mouth daily 90 tablet 3    pantoprazole (PROTONIX) 40 MG tablet TAKE 1 TABLET BY MOUTH EVERY DAY 30 tablet 10    albuterol sulfate  (90 Base) MCG/ACT inhaler Inhale 2 puffs into the lungs 4 times daily as needed for Wheezing 1 Inhaler 0    albuterol (PROVENTIL) (2.5 MG/3ML) 0.083% nebulizer solution Take 3 mLs by nebulization every 6 hours as needed for Wheezing or Shortness of Breath 120 each 3    aspirin 81 MG EC tablet Take 81 mg by mouth daily      metoprolol succinate (TOPROL XL) 100 MG extended release tablet Take 1 tablet by mouth 2 times daily 180 tablet 3    acetaminophen-codeine (TYLENOL #3) 300-30 MG per tablet TAKE ONE TABLET BY MOUTH EVERY 6 HOURS AS NEEDED FOR 1 WEEK  0    glimepiride (AMARYL) 2 MG tablet Take 2 mg by mouth daily       Coenzyme Q10 (CO Q-10) 400 MG CAPS Take 400 mg by mouth 2 times daily       Probiotic Product (PROBIOTIC DAILY PO) Take 1 tablet by mouth daily      simvastatin (ZOCOR) 40 MG tablet Take 20 mg by mouth nightly       pioglitazone (ACTOS) 30 MG tablet Take 30 mg by mouth daily      Multiple Vitamins-Minerals (CENTRUM SILVER) TABS Take by mouth daily       No current facility-administered medications for this visit. No family history on file. Social History     Tobacco Use    Smoking status: Former Smoker     Packs/day: 1.00     Years: 50.00     Pack years: 50.00     Types: Cigarettes, Pipe    Smokeless tobacco: Never Used   Substance Use Topics    Alcohol use: No     Alcohol/week: 0.0 oz    Drug use: No       Review of Systems   Constitutional: Negative. HENT: Negative. Eyes: Negative. Respiratory: Positive for shortness of breath.     Cardiovascular: Negative. Endocrine: Negative. Musculoskeletal: Positive for arthralgias and myalgias. Skin: Negative. Allergic/Immunologic: Negative. Neurological: Negative. Hematological: Negative. Psychiatric/Behavioral: Negative. All other systems reviewed and are negative. Objective:     Physical Exam   Constitutional: He is oriented to person, place, and time. He appears well-developed and well-nourished. HENT:   Head: Normocephalic and atraumatic. Right Ear: External ear normal.   Left Ear: External ear normal.   Nose: Nose normal.   Mouth/Throat: Oropharynx is clear and moist. No oropharyngeal exudate. Eyes: Conjunctivae are normal. No scleral icterus. Neck: Neck supple. No JVD present. No tracheal deviation present. No thyromegaly present. Cardiovascular: Normal rate, regular rhythm and normal heart sounds. Exam reveals no gallop. No murmur heard. Pulmonary/Chest: Effort normal. No respiratory distress. He has no wheezes. He has no rales. He exhibits no tenderness. Abdominal: Soft. There is no tenderness. Musculoskeletal: He exhibits no edema. Lymphadenopathy:     He has no cervical adenopathy. Neurological: He is alert and oriented to person, place, and time. Skin: Skin is warm and dry. Nursing note and vitals reviewed.       Wt Readings from Last 3 Encounters:   05/23/19 192 lb (87.1 kg)   05/07/19 193 lb (87.5 kg)   02/05/19 189 lb 14.4 oz (86.1 kg)       Results for orders placed or performed during the hospital encounter of 05/02/19   CBC   Result Value Ref Range    WBC 5.4 3.5 - 11.3 k/uL    RBC 4.99 4.21 - 5.77 m/uL    Hemoglobin 11.3 (L) 13.0 - 17.0 g/dL    Hematocrit 40.8 40.7 - 50.3 %    MCV 81.8 (L) 82.6 - 102.9 fL    MCH 22.6 (L) 25.2 - 33.5 pg    MCHC 27.7 (L) 28.4 - 34.8 g/dL    RDW 26.0 (H) 11.8 - 14.4 %    Platelets 267 898 - 633 k/uL    MPV 9.1 8.1 - 13.5 fL    NRBC Automated 0.0 0.0 per 100 WBC   Albumin   Result Value Ref Range    Alb 3.8 3.5 - 5.2 g/dL questions or problems.       Electronically signed by Elizabet Villagran DO on 5/23/2019 at 11:07 AM

## 2019-06-17 ENCOUNTER — HOSPITAL ENCOUNTER (OUTPATIENT)
Age: 84
Setting detail: OBSERVATION
Discharge: HOME | End: 2019-06-18
Attending: EMERGENCY MEDICINE | Admitting: INTERNAL MEDICINE
Payer: MEDICARE

## 2019-06-17 ENCOUNTER — APPOINTMENT (OUTPATIENT)
Dept: GENERAL RADIOLOGY | Age: 84
End: 2019-06-17
Payer: MEDICARE

## 2019-06-17 ENCOUNTER — APPOINTMENT (OUTPATIENT)
Dept: ULTRASOUND IMAGING | Age: 84
End: 2019-06-17
Payer: MEDICARE

## 2019-06-17 DIAGNOSIS — R07.81 PLEURITIC CHEST PAIN: Primary | ICD-10-CM

## 2019-06-17 DIAGNOSIS — J90 PLEURAL EFFUSION ON LEFT: ICD-10-CM

## 2019-06-17 LAB
ABSOLUTE EOS #: 0.06 K/UL (ref 0–0.44)
ABSOLUTE IMMATURE GRANULOCYTE: <0.03 K/UL (ref 0–0.3)
ABSOLUTE LYMPH #: 1.61 K/UL (ref 1.1–3.7)
ABSOLUTE MONO #: 0.69 K/UL (ref 0.1–1.2)
ALBUMIN SERPL-MCNC: 4.2 G/DL (ref 3.5–5.2)
ALBUMIN/GLOBULIN RATIO: 1.4 (ref 1–2.5)
ALP BLD-CCNC: 134 U/L (ref 40–129)
ALT SERPL-CCNC: 8 U/L (ref 5–41)
ANION GAP SERPL CALCULATED.3IONS-SCNC: 13 MMOL/L (ref 9–17)
APPEARANCE FLUID: CLEAR
AST SERPL-CCNC: 16 U/L
BASO FLUID: ABNORMAL %
BASOPHILS # BLD: 1 % (ref 0–2)
BASOPHILS ABSOLUTE: 0.04 K/UL (ref 0–0.2)
BILIRUB SERPL-MCNC: 0.78 MG/DL (ref 0.3–1.2)
BUN BLDV-MCNC: 22 MG/DL (ref 8–23)
BUN/CREAT BLD: 15 (ref 9–20)
CALCIUM SERPL-MCNC: 9.6 MG/DL (ref 8.6–10.4)
CHLORIDE BLD-SCNC: 103 MMOL/L (ref 98–107)
CO2: 27 MMOL/L (ref 20–31)
COLOR FLUID: YELLOW
CREAT SERPL-MCNC: 1.43 MG/DL (ref 0.7–1.2)
DIFFERENTIAL TYPE: ABNORMAL
EOSINOPHIL FLUID: ABNORMAL %
EOSINOPHILS RELATIVE PERCENT: 1 % (ref 1–4)
FLUID DIFF COMMENT: ABNORMAL
GFR AFRICAN AMERICAN: 57 ML/MIN
GFR NON-AFRICAN AMERICAN: 47 ML/MIN
GFR SERPL CREATININE-BSD FRML MDRD: ABNORMAL ML/MIN/{1.73_M2}
GFR SERPL CREATININE-BSD FRML MDRD: ABNORMAL ML/MIN/{1.73_M2}
GLUCOSE BLD-MCNC: 162 MG/DL (ref 70–99)
GLUCOSE BLD-MCNC: 170 MG/DL (ref 74–100)
GLUCOSE BLD-MCNC: 262 MG/DL (ref 74–100)
GLUCOSE, FLUID: 147 MG/DL
HCT VFR BLD CALC: 44.3 % (ref 40.7–50.3)
HEMOGLOBIN: 13.5 G/DL (ref 13–17)
IMMATURE GRANULOCYTES: 0 %
INR BLD: 1.1 (ref 0.9–1.2)
LACTATE DEHYDROGENASE, FLUID: 90 U/L
LYMPHOCYTES # BLD: 20 % (ref 24–43)
LYMPHOCYTES, BODY FLUID: 93 %
MCH RBC QN AUTO: 26.3 PG (ref 25.2–33.5)
MCHC RBC AUTO-ENTMCNC: 30.5 G/DL (ref 28.4–34.8)
MCV RBC AUTO: 86.4 FL (ref 82.6–102.9)
MONOCYTE, FLUID: 3 %
MONOCYTES # BLD: 8 % (ref 3–12)
NEUTROPHIL, FLUID: 2 %
NRBC AUTOMATED: 0 PER 100 WBC
OTHER CELLS FLUID: 2 %
PDW BLD-RTO: 19.8 % (ref 11.8–14.4)
PH FLUID: 8
PLATELET # BLD: 150 K/UL (ref 138–453)
PLATELET ESTIMATE: ABNORMAL
PMV BLD AUTO: 9.1 FL (ref 8.1–13.5)
POTASSIUM SERPL-SCNC: 4.2 MMOL/L (ref 3.7–5.3)
PROTHROMBIN TIME: 11 SEC (ref 9.7–12.2)
RBC # BLD: 5.13 M/UL (ref 4.21–5.77)
RBC # BLD: ABNORMAL 10*6/UL
RBC FLUID: <3000 /MM3
SEG NEUTROPHILS: 70 % (ref 36–65)
SEGMENTED NEUTROPHILS ABSOLUTE COUNT: 5.83 K/UL (ref 1.5–8.1)
SODIUM BLD-SCNC: 143 MMOL/L (ref 135–144)
SPECIMEN TYPE: ABNORMAL
SPECIMEN TYPE: NORMAL
TOTAL PROTEIN, BODY FLUID: <1 G/DL
TOTAL PROTEIN: 7.1 G/DL (ref 6.4–8.3)
TROPONIN INTERP: NORMAL
TROPONIN T: <0.03 NG/ML
TROPONIN, HIGH SENSITIVITY: NORMAL NG/L (ref 0–22)
WBC # BLD: 8.3 K/UL (ref 3.5–11.3)
WBC # BLD: ABNORMAL 10*3/UL
WBC FLUID: 736 /MM3

## 2019-06-17 PROCEDURE — 2580000003 HC RX 258: Performed by: PHYSICIAN ASSISTANT

## 2019-06-17 PROCEDURE — 87102 FUNGUS ISOLATION CULTURE: CPT

## 2019-06-17 PROCEDURE — 83615 LACTATE (LD) (LDH) ENZYME: CPT

## 2019-06-17 PROCEDURE — 87205 SMEAR GRAM STAIN: CPT

## 2019-06-17 PROCEDURE — 6370000000 HC RX 637 (ALT 250 FOR IP): Performed by: PHYSICIAN ASSISTANT

## 2019-06-17 PROCEDURE — 36415 COLL VENOUS BLD VENIPUNCTURE: CPT

## 2019-06-17 PROCEDURE — 88112 CYTOPATH CELL ENHANCE TECH: CPT

## 2019-06-17 PROCEDURE — 93005 ELECTROCARDIOGRAM TRACING: CPT | Performed by: EMERGENCY MEDICINE

## 2019-06-17 PROCEDURE — 87070 CULTURE OTHR SPECIMN AEROBIC: CPT

## 2019-06-17 PROCEDURE — 80053 COMPREHEN METABOLIC PANEL: CPT

## 2019-06-17 PROCEDURE — 85610 PROTHROMBIN TIME: CPT

## 2019-06-17 PROCEDURE — 87116 MYCOBACTERIA CULTURE: CPT

## 2019-06-17 PROCEDURE — 6370000000 HC RX 637 (ALT 250 FOR IP): Performed by: INTERNAL MEDICINE

## 2019-06-17 PROCEDURE — 87206 SMEAR FLUORESCENT/ACID STAI: CPT

## 2019-06-17 PROCEDURE — 82947 ASSAY GLUCOSE BLOOD QUANT: CPT

## 2019-06-17 PROCEDURE — 71046 X-RAY EXAM CHEST 2 VIEWS: CPT

## 2019-06-17 PROCEDURE — 99285 EMERGENCY DEPT VISIT HI MDM: CPT

## 2019-06-17 PROCEDURE — 85025 COMPLETE CBC W/AUTO DIFF WBC: CPT

## 2019-06-17 PROCEDURE — 94664 DEMO&/EVAL PT USE INHALER: CPT

## 2019-06-17 PROCEDURE — 96374 THER/PROPH/DIAG INJ IV PUSH: CPT

## 2019-06-17 PROCEDURE — 2500000003 HC RX 250 WO HCPCS: Performed by: EMERGENCY MEDICINE

## 2019-06-17 PROCEDURE — 97165 OT EVAL LOW COMPLEX 30 MIN: CPT

## 2019-06-17 PROCEDURE — 1200000000 HC SEMI PRIVATE

## 2019-06-17 PROCEDURE — 87075 CULTR BACTERIA EXCEPT BLOOD: CPT

## 2019-06-17 PROCEDURE — 99222 1ST HOSP IP/OBS MODERATE 55: CPT | Performed by: INTERNAL MEDICINE

## 2019-06-17 PROCEDURE — 88305 TISSUE EXAM BY PATHOLOGIST: CPT

## 2019-06-17 PROCEDURE — 84484 ASSAY OF TROPONIN QUANT: CPT

## 2019-06-17 PROCEDURE — 89051 BODY FLUID CELL COUNT: CPT

## 2019-06-17 PROCEDURE — 82945 GLUCOSE OTHER FLUID: CPT

## 2019-06-17 PROCEDURE — 71045 X-RAY EXAM CHEST 1 VIEW: CPT

## 2019-06-17 PROCEDURE — 83986 ASSAY PH BODY FLUID NOS: CPT

## 2019-06-17 PROCEDURE — 32555 ASPIRATE PLEURA W/ IMAGING: CPT

## 2019-06-17 PROCEDURE — 97161 PT EVAL LOW COMPLEX 20 MIN: CPT

## 2019-06-17 PROCEDURE — 84157 ASSAY OF PROTEIN OTHER: CPT

## 2019-06-17 RX ORDER — ALBUTEROL SULFATE 2.5 MG/3ML
2.5 SOLUTION RESPIRATORY (INHALATION) EVERY 6 HOURS PRN
Status: DISCONTINUED | OUTPATIENT
Start: 2019-06-17 | End: 2019-06-18 | Stop reason: HOSPADM

## 2019-06-17 RX ORDER — ASPIRIN 81 MG/1
81 TABLET ORAL DAILY
Status: DISCONTINUED | OUTPATIENT
Start: 2019-06-17 | End: 2019-06-18 | Stop reason: HOSPADM

## 2019-06-17 RX ORDER — SODIUM CHLORIDE 0.9 % (FLUSH) 0.9 %
10 SYRINGE (ML) INJECTION EVERY 12 HOURS SCHEDULED
Status: DISCONTINUED | OUTPATIENT
Start: 2019-06-17 | End: 2019-06-18 | Stop reason: HOSPADM

## 2019-06-17 RX ORDER — NICOTINE POLACRILEX 4 MG
15 LOZENGE BUCCAL PRN
Status: DISCONTINUED | OUTPATIENT
Start: 2019-06-17 | End: 2019-06-18 | Stop reason: HOSPADM

## 2019-06-17 RX ORDER — LABETALOL HYDROCHLORIDE 5 MG/ML
10 INJECTION, SOLUTION INTRAVENOUS ONCE
Status: COMPLETED | OUTPATIENT
Start: 2019-06-17 | End: 2019-06-17

## 2019-06-17 RX ORDER — SIMVASTATIN 20 MG
20 TABLET ORAL NIGHTLY
Status: DISCONTINUED | OUTPATIENT
Start: 2019-06-17 | End: 2019-06-18 | Stop reason: HOSPADM

## 2019-06-17 RX ORDER — METOPROLOL SUCCINATE 100 MG/1
100 TABLET, EXTENDED RELEASE ORAL 2 TIMES DAILY
Status: DISCONTINUED | OUTPATIENT
Start: 2019-06-17 | End: 2019-06-18 | Stop reason: HOSPADM

## 2019-06-17 RX ORDER — DEXTROSE MONOHYDRATE 50 MG/ML
100 INJECTION, SOLUTION INTRAVENOUS PRN
Status: DISCONTINUED | OUTPATIENT
Start: 2019-06-17 | End: 2019-06-18 | Stop reason: HOSPADM

## 2019-06-17 RX ORDER — POLYETHYLENE GLYCOL 3350 17 G/17G
17 POWDER, FOR SOLUTION ORAL DAILY PRN
Status: DISCONTINUED | OUTPATIENT
Start: 2019-06-17 | End: 2019-06-18 | Stop reason: HOSPADM

## 2019-06-17 RX ORDER — PIOGLITAZONEHYDROCHLORIDE 15 MG/1
30 TABLET ORAL DAILY
Status: DISCONTINUED | OUTPATIENT
Start: 2019-06-17 | End: 2019-06-18 | Stop reason: HOSPADM

## 2019-06-17 RX ORDER — DEXTROSE MONOHYDRATE 25 G/50ML
12.5 INJECTION, SOLUTION INTRAVENOUS PRN
Status: DISCONTINUED | OUTPATIENT
Start: 2019-06-17 | End: 2019-06-18 | Stop reason: HOSPADM

## 2019-06-17 RX ORDER — GLIMEPIRIDE 1 MG/1
2 TABLET ORAL DAILY
Status: DISCONTINUED | OUTPATIENT
Start: 2019-06-17 | End: 2019-06-18 | Stop reason: HOSPADM

## 2019-06-17 RX ORDER — POTASSIUM CHLORIDE 20 MEQ/1
40 TABLET, EXTENDED RELEASE ORAL PRN
Status: DISCONTINUED | OUTPATIENT
Start: 2019-06-17 | End: 2019-06-18 | Stop reason: HOSPADM

## 2019-06-17 RX ORDER — METOPROLOL TARTRATE 5 MG/5ML
5 INJECTION INTRAVENOUS EVERY 6 HOURS PRN
Status: DISCONTINUED | OUTPATIENT
Start: 2019-06-17 | End: 2019-06-18 | Stop reason: HOSPADM

## 2019-06-17 RX ORDER — PANTOPRAZOLE SODIUM 40 MG/1
40 TABLET, DELAYED RELEASE ORAL DAILY
Status: DISCONTINUED | OUTPATIENT
Start: 2019-06-17 | End: 2019-06-18 | Stop reason: HOSPADM

## 2019-06-17 RX ORDER — TORSEMIDE 20 MG/1
10 TABLET ORAL EVERY OTHER DAY
Status: DISCONTINUED | OUTPATIENT
Start: 2019-06-18 | End: 2019-06-18

## 2019-06-17 RX ORDER — ACETAMINOPHEN 325 MG/1
650 TABLET ORAL EVERY 4 HOURS PRN
Status: DISCONTINUED | OUTPATIENT
Start: 2019-06-17 | End: 2019-06-18 | Stop reason: HOSPADM

## 2019-06-17 RX ORDER — POTASSIUM CHLORIDE 7.45 MG/ML
10 INJECTION INTRAVENOUS PRN
Status: DISCONTINUED | OUTPATIENT
Start: 2019-06-17 | End: 2019-06-18 | Stop reason: HOSPADM

## 2019-06-17 RX ORDER — SODIUM CHLORIDE 0.9 % (FLUSH) 0.9 %
10 SYRINGE (ML) INJECTION PRN
Status: DISCONTINUED | OUTPATIENT
Start: 2019-06-17 | End: 2019-06-18 | Stop reason: HOSPADM

## 2019-06-17 RX ORDER — ALLOPURINOL 100 MG/1
100 TABLET ORAL DAILY
Status: DISCONTINUED | OUTPATIENT
Start: 2019-06-17 | End: 2019-06-18 | Stop reason: HOSPADM

## 2019-06-17 RX ORDER — ALBUTEROL SULFATE 90 UG/1
2 AEROSOL, METERED RESPIRATORY (INHALATION) 4 TIMES DAILY PRN
Status: DISCONTINUED | OUTPATIENT
Start: 2019-06-17 | End: 2019-06-18 | Stop reason: HOSPADM

## 2019-06-17 RX ORDER — ONDANSETRON 2 MG/ML
4 INJECTION INTRAMUSCULAR; INTRAVENOUS EVERY 6 HOURS PRN
Status: DISCONTINUED | OUTPATIENT
Start: 2019-06-17 | End: 2019-06-18 | Stop reason: HOSPADM

## 2019-06-17 RX ORDER — MAGNESIUM SULFATE 1 G/100ML
1 INJECTION INTRAVENOUS PRN
Status: DISCONTINUED | OUTPATIENT
Start: 2019-06-17 | End: 2019-06-18 | Stop reason: HOSPADM

## 2019-06-17 RX ORDER — HYDROCODONE BITARTRATE AND ACETAMINOPHEN 5; 325 MG/1; MG/1
1 TABLET ORAL EVERY 4 HOURS PRN
Status: DISCONTINUED | OUTPATIENT
Start: 2019-06-17 | End: 2019-06-18 | Stop reason: HOSPADM

## 2019-06-17 RX ADMIN — ASPIRIN 81 MG: 81 TABLET ORAL at 17:25

## 2019-06-17 RX ADMIN — PIOGLITAZONE 30 MG: 15 TABLET ORAL at 17:25

## 2019-06-17 RX ADMIN — PANTOPRAZOLE SODIUM 40 MG: 40 TABLET, DELAYED RELEASE ORAL at 17:26

## 2019-06-17 RX ADMIN — INSULIN LISPRO 2 UNITS: 100 INJECTION, SOLUTION INTRAVENOUS; SUBCUTANEOUS at 17:25

## 2019-06-17 RX ADMIN — Medication 10 ML: at 20:45

## 2019-06-17 RX ADMIN — METOPROLOL SUCCINATE 100 MG: 100 TABLET, EXTENDED RELEASE ORAL at 20:44

## 2019-06-17 RX ADMIN — GLIMEPIRIDE 2 MG: 1 TABLET ORAL at 17:25

## 2019-06-17 RX ADMIN — LABETALOL HYDROCHLORIDE 5 MG: 5 INJECTION INTRAVENOUS at 12:36

## 2019-06-17 RX ADMIN — SIMVASTATIN 20 MG: 20 TABLET, FILM COATED ORAL at 20:44

## 2019-06-17 RX ADMIN — ALLOPURINOL 100 MG: 100 TABLET ORAL at 17:25

## 2019-06-17 RX ADMIN — INSULIN LISPRO 3 UNITS: 100 INJECTION, SOLUTION INTRAVENOUS; SUBCUTANEOUS at 20:45

## 2019-06-17 ASSESSMENT — PAIN SCALES - GENERAL
PAINLEVEL_OUTOF10: 0
PAINLEVEL_OUTOF10: 0
PAINLEVEL_OUTOF10: 2
PAINLEVEL_OUTOF10: 0

## 2019-06-17 ASSESSMENT — PAIN DESCRIPTION - LOCATION: LOCATION: CHEST

## 2019-06-17 ASSESSMENT — PAIN DESCRIPTION - DESCRIPTORS: DESCRIPTORS: SHARP

## 2019-06-17 ASSESSMENT — PAIN DESCRIPTION - ORIENTATION: ORIENTATION: MID

## 2019-06-17 ASSESSMENT — PAIN DESCRIPTION - PAIN TYPE: TYPE: ACUTE PAIN

## 2019-06-17 NOTE — PROGRESS NOTES
to enter with rails  Entrance Stairs - Number of Steps: 6 or 7  Entrance Stairs - Rails: Right  Bathroom Shower/Tub: Walk-in shower, Shower chair with back  Bathroom Toilet: Standard  Bathroom Equipment: Grab bars in shower  Receives Help From: Family  ADL Assistance: [address]: Independent  Homemaking Responsibilities: Yes  Ambulation Assistance: Independent  Transfer Assistance: Independent  Active : Yes       Objective   Vision: Within Functional Limits  Hearing: Within functional limits          Balance  Sitting Balance: Stand by assistance              Transfers  Sit to stand: Stand by assistance  Stand to sit: Stand by assistance       Therapy Time   Individual Concurrent Group Co-treatment   Time In 1610         Time Out 1627         Minutes [address] [facility] [place], MOT, OTR/L

## 2019-06-17 NOTE — PROGRESS NOTES
Physical Therapy    Facility/Department: [facility] AT THE [facility] MED SURG  Initial Assessment    NAME: Kate Morales  : 1931  MRN: [id number]    Date of Service: 2019    Discharge Recommendations:  Home with assist PRN     Assessment   Assessment: independent with functional mobility  Treatment Diagnosis: general weakness  Prognosis: Good  Decision Making: Low Complexity  Patient Education: PT eval and reasons for discharge  No Skilled PT: Independent with functional mobility   REQUIRES PT FOLLOW UP: No  Activity Tolerance  Activity Tolerance: Patient Tolerated treatment well       Patient Diagnosis(es): The primary encounter diagnosis was Pleuritic chest pain. A diagnosis of Pleural effusion on left was also pertinent to this visit. has a past medical history of Abnormal ultrasound of lower extremity, Atrial fibrillation (HCC), Bladder cancer ([facility] CAD (coronary artery disease), Dyspnea on exertion, H/O echocardiogram, H/O total cystectomy, History of abdominal aortic aneurysm (AAA), History of bleeding ulcers, History of Holter monitoring, History of Holter monitoring, History of Holter monitoring, History of stress test, Hypertension, Pacemaker, and Polymyalgia rheumatica ([facility] has a past surgical history that includes Coronary artery bypass graft (); Appendectomy; Abdominal aortic aneurysm repair (); Kidney removal (Left, 2015); Tonsillectomy and adenoidectomy; Cholecystectomy; Cataract removal (Bilateral); Pacemaker insertion; Prostatectomy; and Bladder surgery.     Restrictions  Restrictions/Precautions  Restrictions/Precautions: General Precautions     Vision/Hearing  Vision: Within Functional Limits  Hearing: Within functional limits       Subjective  General  Chart Reviewed: Yes  Patient assessed for rehabilitation services?: Yes  Response To Previous Treatment: Not applicable  Family / Caregiver Present: Yes  Follows Commands: Within Functional Limits  Pain Screening  Patient Currently in Pain: Denies    Orientation  Orientation  Overall Orientation Status: Within Normal Limits     Social/Functional History  Social/Functional History  Lives With: Spouse  Type of Home: House  Home Layout: Multi-level, Able to Live on Main level with bedroom/bathroom  Home Access: Stairs to enter with rails  Entrance Stairs - Number of Steps: 6 or 7  Entrance Stairs - Rails: Right  Home Equipment: Marlyse Boadwoaois Help From: Family  ADL Assistance: Independent  Homemaking Assistance: Independent  Homemaking Responsibilities: Yes  Ambulation Assistance: Independent  Transfer Assistance: Independent  Active : Yes  Mode of Transportation: Car  Additional Comments: takes care of shopping and cooking    Cognition   Cognition  Overall Cognitive Status: WNL    Objective  AROM RLE (degrees)  RLE AROM: WFL  AROM LLE (degrees)  LLE AROM : WFL     Strength RLE  Strength RLE: WFL  Strength LLE  Strength LLE: WFL        Bed mobility  Comment: sitting EOB at time of eval     Transfers  Sit to Stand: Independent  Stand to sit: Independent     Ambulation  Ambulation?: Yes  Ambulation 1  Surface: level tile  Device: No Device  Assistance: Independent  Distance: 150ft  Comments: steady with no LOB     Balance  Standing - Static: Good  Standing - Dynamic: Good        Plan   Plan  Plan Comment: discharge PT  Safety Devices  Type of devices: All fall risk precautions in place           AM-PAC Score  AM-PAC Inpatient Mobility without Stair Climbing Raw Score : 20 (06/17/19 1756)  AM-PAC Inpatient without Stair Climbing T-Scale Score : 60.57 (06/17/19 1756)  Mobility Inpatient CMS 0-100% Score: 0 (06/17/19 1756)  Mobility Inpatient without Stair CMS G-Code Modifier : [address] (06/17/19 1756)       Goals  Short term goals  Time Frame for Short term goals: 1 visit  Short term goal 1: Pt to demonstrate independence with functional mobility and gait to ensure safe discharge.    Patient Goals   Patient goals : home at discharge       Therapy Time Individual Concurrent Group Co-treatment   Time In 1732         Time Out 1748         Minutes 16                 Kylee Miller, PT, DPT, CMPT

## 2019-06-17 NOTE — ED PROVIDER NOTES
[facility] AT THE [facility] MED SURG  EMERGENCY DEPARTMENT     Pt Name: Rogerio Pringle  MRN: [id number]  [place] 12/24/1931  Date of evaluation: 6/17/2019  Provider: Omar Rivera DO, [address]       Chief Complaint   Patient presents with    Chest Pain     Mid chest, onset this AM with deep breathing. Pt states \"I can't get a deep breath in\"       HISTORY OF PRESENT ILLNESS    Rogerio Pringle is a 80 y.o. male who presents to the emergency department from home with complaints of anterior chest pain which is nonradiating nature. Patient states the symptoms started this morning. They are very positional, and he develops both shortness of breath and chest pain when he lays flat. The shortness of breath more of a sensation that he cannot take a deep breath due to the pain, rather than actual dyspnea. No diaphoresis, no lower extremity pain or swelling. No other associated symptoms. At rest, he has barely any discomfort when he is sitting up and no shortness of breath. Does have a history of atrial fibrillation, is on baby aspirin      Triage notes and Nursing notes were reviewed by myself. Any discrepancies are addressed above. PAST MEDICAL HISTORY     Past Medical History:   Diagnosis Date    Abnormal ultrasound of lower extremity 7/22/15    Moderate to severe bilateral,multifocal,arterial insufficiency in  both legs. Minimal inflow component. Claudication present during exercise componet with worsenind NIVIA on the left post-exercise.  Atrial fibrillation [facility])     s/p pacemaker placement due to failure of multiple rhythm control strategies    Bladder cancer ([facility] [address]     CAD (coronary artery disease)     Dyspnea on exertion 5/7/2019    H/O echocardiogram 04/19/2018    EF 55%. LV cavity size is within normal limits,LV wall thickness is mildly increased. No definite specific wall motion abnormalities were identified. Mitral annular calcification.  Myxomatous thickening of the mitral leaflets. Moderate mitral regurg. rhythm of what appeared to be atrial fib.  H/O total cystectomy 2/5/2019    For bladder CA in 2006, has an ileal conduit    History of abdominal aortic aneurysm (AAA) 2/5/2019    S/p repair 1993     History of bleeding ulcers     duodenal    History of Holter monitoring 9/21/15    atrial fibrillation/flutter throughout with an average HR of 67 bpm. 78 pauses >2 seconds and 1 pause >3 seconds,but all occured during typical hours of sleep.  History of Holter monitoring 5/27/16    Paroxysmal atrial fib with rapid ventricular response (A-Fib 38% of the time). Occasional isolated PVC's    History of Holter monitoring 08/09/2016    Atrial Fibrillation throughout with heart rates ranging between  bpm.  Four 2 second pauses. Occasional PVC's    History of stress test 04/25/2018    Normal. LV systolic function cannot be assessed because of no gating. ECG is uninterpretable at baseline because of demand ventricular pacing. Low risk for significant CAD.     Hypertension     Pacemaker 11/07/2016    [employer]     Polymyalgia rheumatica ([address]        SURGICAL HISTORY       Past Surgical History:   Procedure Laterality Date    ABDOMINAL [facility]ORTIC [facility]PA[facility]  [facility]    APPENDECTOMY      BLADDER SURGERY      CATARACT REMOVAL Bilateral     CHOLECYSTECTOMY      CORONARY ARTERY BYPASS GRAFT  1995    [facility]    [facility]    PACEMAKER INSERTION      PROSTATECTOMY      TONSILLECTOMY AND ADENOIDECTOMY         CURRENT MEDICATIONS       Discharge Medication List as of 6/18/2019  4:49 PM      CONTINUE these medications which have NOT CHANGED    Details   umeclidinium-vilanterol (ANORO ELLIPTA) 62.5-25 MCG/INH AEPB inhaler Inhale 1 puff into the lungs every morning, Disp-1 each, R-11Normal      allopurinol (ZYLOPRIM) 100 MG tablet Take 1 tablet by mouth daily, Disp-90 tablet, R-3Normal      pantoprazole (PROTONIX) 40 MG tablet TAKE 1 TABLET BY MOUTH EVERY DAY, Disp-30 tablet, R-10Normal      aspirin 81 MG EC tablet Take 81 mg by mouth dailyHistorical Med      metoprolol succinate (TOPROL XL) 100 MG extended release tablet Take 1 tablet by mouth 2 times daily, Disp-180 tablet, R-3Normal      glimepiride (AMARYL) 2 MG tablet Take 2 mg by mouth daily Historical Med      Coenzyme Q10 (CO Q-10) 400 MG CAPS Take 400 mg by mouth 2 times daily Historical Med      Probiotic Product (PROBIOTIC DAILY PO) Take 1 tablet by mouth daily      simvastatin (ZOCOR) 40 MG tablet Take 20 mg by mouth nightly       pioglitazone (ACTOS) 30 MG tablet Take 30 mg by mouth daily      Multiple Vitamins-Minerals (CENTRUM SILVER) TABS Take 1 tablet by mouth daily Historical Med      albuterol sulfate  (90 Base) MCG/ACT inhaler Inhale 2 puffs into the lungs 4 times daily as needed for Wheezing, Disp-1 Inhaler, R-0Normal      albuterol (PROVENTIL) (2.5 MG/3ML) 0.083% nebulizer solution Take 3 mLs by nebulization every 6 hours as needed for Wheezing or Shortness of Breath, Disp-120 each, R-3Normal      acetaminophen-codeine (TYLENOL #3) 300-30 MG per tablet TAKE ONE TABLET BY MOUTH EVERY 6 HOURS AS NEEDED FOR 1 WEEK, R-0Historical Med             ALLERGIES     Neomycin-bacitracin zn-polymyx; Bacitracin; and Penicillins    FAMILY HISTORY     History reviewed. No pertinent family history.      SOCIAL HISTORY       Social History     Socioeconomic History    Marital status:      Spouse name: None    Number of children: None    Years of education: None    Highest education level: None   Occupational History    None   Social Needs    Financial resource strain: None    Food insecurity:     Worry: None     Inability: None    Transportation needs:     Medical: None     Non-medical: None   Tobacco Use    Smoking status: Former Smoker     Packs/day: 1.00     Years: 50.00     Pack years: 50.00     Types: Cigarettes, Pipe    Smokeless tobacco: Never Used   Substance and Sexual Activity    Alcohol use: No     Alcohol/week: 0.0 oz    Drug use: No    Sexual activity: None   Lifestyle    Physical activity:     Days per week: None     Minutes per session: None    Stress: None   Relationships    Social connections:     Talks on phone: None     Gets together: None     Attends Yarsanism service: None     Active member of club or organization: None     Attends meetings of clubs or organizations: None     Relationship status: None    Intimate partner violence:     Fear of current or ex partner: None     Emotionally abused: None     Physically abused: None     Forced sexual activity: None   Other Topics Concern    None   Social History Narrative    None       REVIEW OF SYSTEMS     Review of Systems   Constitutional: Negative for chills, diaphoresis and fever. HENT: Negative for facial swelling, sore throat, trouble swallowing and voice change. Eyes: Negative for photophobia and visual disturbance. Respiratory: Positive for shortness of breath. Negative for cough, chest tightness and wheezing. Cardiovascular: Positive for chest pain. Negative for palpitations and leg swelling. Gastrointestinal: Negative for abdominal pain, blood in stool, diarrhea, nausea and vomiting. Endocrine: Negative for polydipsia and polyuria. Genitourinary: Negative for dysuria, frequency, hematuria and urgency. Musculoskeletal: Negative for back pain, neck pain and neck stiffness. Skin: Negative for pallor and rash. Neurological: Negative for seizures, speech difficulty, weakness, numbness and headaches. Hematological: Does not bruise/bleed easily. Psychiatric/Behavioral: Negative for confusion. The patient is not nervous/anxious. Except as noted above the remainder of the review of systems was reviewed and is.    PHYSICAL EXAM    (up to 7 for level 4, 8 or more for level 5)     ED Triage Vitals [06/17/19 1106]   BP Temp Temp Source Pulse Resp SpO2 Height Weight   (!) 183/92 97.7 °F (36.5 °C) Oral 85 18 96 % 5' 7\" (1.702 m) 190 lb (86.2 kg)       Physical Exam   Constitutional: He is oriented to person, place, and time. Vital signs are normal. He appears well-developed and well-nourished. Non-toxic appearance. He does not appear ill. No distress. HENT:   Head: Normocephalic and atraumatic. Mouth/Throat: Oropharynx is clear and moist.   Eyes: Pupils are equal, round, and reactive to light. Conjunctivae and EOM are normal. Right conjunctiva is not injected. Left conjunctiva is not injected. No scleral icterus. Neck: Normal range of motion. Neck supple. No tracheal deviation present. No thyromegaly present. Cardiovascular: Normal rate, regular rhythm, normal heart sounds and intact distal pulses. Exam reveals no gallop and no friction rub. No murmur heard. Pulmonary/Chest: Effort normal and breath sounds normal. No stridor. No respiratory distress. He has no wheezes. He has no rales. Abdominal: Soft. Bowel sounds are normal. He exhibits no distension and no mass. There is no tenderness. There is no rebound and no guarding. Negative Renteria's sign  Nontender McBurney's Point  Negative Rovsig's sign  No bruising or echymosis of abdomen   Musculoskeletal: He exhibits no edema or tenderness. Negative Dina's Sign bilaterally   Lymphadenopathy:     He has no cervical adenopathy. Neurological: He is alert and oriented to person, place, and time. No cranial nerve deficit. He exhibits normal muscle tone. Coordination normal.   No nystagmus   Skin: Skin is warm and dry. No rash noted. He is not diaphoretic. No erythema. No pallor. Psychiatric: He has a normal mood and affect. His behavior is normal. Thought content normal.   Nursing note and vitals reviewed.       DIAGNOSTIC RESULTS     EKG:(none if blank)  All E[facility]'s are [facility] who either signs or Co-signs this chart in the absence of a cardiologist.    EKG shows atrial fibrillation with intermittent ventricular pacing. T wave inversions are present only 2 3 and aVF as well as V3 through V6 and the patient's native beats    RADIOLOGY: (none if blank)   Interpretation per the Radiologistbelow, if available at the time of this note:    CT CHEST WO CONTRAST   Final Result   Small bilateral pleural effusions with lower lobe atelectasis and likely   small infiltrates, especially LLL. COPD/emphysema. Mild cardiomegaly. Small posterior pericardial effusion. Prior granulomatous disease. Heavy vascular calcifications coronary arteries aorta. PPM.      Large amount of residual food/debris in the stomach. XR CHEST (SINGLE VIEW FRONTAL)   Final Result   Cardiomegaly and generalized interstitial prominence. Small left pleural   effusion, significantly decreased as compared to previous examination. No   evidence for pneumothorax. US THORACENTESIS   Final Result   Successful ultrasound guided thoracentesis. XR CHEST STANDARD (2 VW)   Final Result   Moderate to large left effusion with associated airspace disease.              LABS:  Labs Reviewed   BODY FLUID CULTURE - Abnormal; Notable for the following components:       Result Value    Direct Exam MANY MONONUCLEAR WHITE BLOOD CELLS SEEN (*)     All other components within normal limits   CBC WITH AUTO DIFFERENTIAL - Abnormal; Notable for the following components:    RDW 19.8 (*)     Seg Neutrophils 70 (*)     Lymphocytes 20 (*)     All other components within normal limits   COMPREHENSIVE METABOLIC PANEL W/ REFLEX TO MG FOR LOW K - Abnormal; Notable for the following components:    Glucose 162 (*)     CREATININE 1.43 (*)     Alkaline Phosphatase 134 (*)     GFR Non- 47 (*)     GFR  57 (*)     All other components within normal limits   BODY FLUID CELL COUNT WITH DIFFERENTIAL - Abnormal; Notable for the following components: Neutrophil Count, Fluid 2 (*)     Lymphocytes, Body Fluid 93 (*)     Monocyte Count, Fluid 3 (*)     Other Cells, Fluid 2 (*)     All other components within normal limits   GLUCOSE, WHOLE BLOOD - Abnormal; Notable for the following components:    POC Glucose 170 (*)     All other components within normal limits   COMPREHENSIVE METABOLIC PANEL W/ REFLEX TO MG FOR LOW K - Abnormal; Notable for the following components:    BUN 26 (*)     CREATININE 1.50 (*)     ALT <5 (*)     Total Protein 5.5 (*)     Alb 3.0 (*)     GFR Non- 44 (*)     GFR  54 (*)     All other components within normal limits   CBC WITH AUTO DIFFERENTIAL - Abnormal; Notable for the following components:    Hemoglobin 11.5 (*)     Hematocrit 38.4 (*)     RDW 19.9 (*)     All other components within normal limits   GLUCOSE, WHOLE BLOOD - Abnormal; Notable for the following components:    POC Glucose 262 (*)     All other components within normal limits   HEMOGLOBIN A1C - Abnormal; Notable for the following components:    Hemoglobin A1C 6.0 (*)     All other components within normal limits   IRON - Abnormal; Notable for the following components:    Iron 19 (*)     All other components within normal limits   IMMATURE PLATELET FRACTION - Abnormal; Notable for the following components:    Platelet, Fluorescence 125 (*)     All other components within normal limits   GLUCOSE, WHOLE BLOOD - Abnormal; Notable for the following components:    POC Glucose 128 (*)     All other components within normal limits   ACID FAST CULTURE WITH SMEAR   FUNGUS CULTURE   TROPONIN   PROTIME-INR   GLUCOSE, BODY FLUID   LACTATE DEHYDROGENASE, BODY FLUID   PH, BODY FLUID   PROTEIN, BODY FLUID   SMEAR FUNGUS   CYTOLOGY, NON-GYN   SEDIMENTATION RATE   GLUCOSE, WHOLE BLOOD   SURGICAL PATHOLOGY   POCT GLUCOSE   POCT GLUCOSE   POCT GLUCOSE   POCT GLUCOSE   POCT GLUCOSE       All other labs were within normal range or not returned as of this dictation.

## 2019-06-17 NOTE — CONSULTS
Michael Neely am scribing for and in the presence of Sharmin Ferrer MD, F.A.C.C..    Patient: Chanel Nichols  : 1931  Date of Admission: 2019  Primary Care Physician: Chintan Yuen  Today's Date: 2019    REASON FOR CONSULTATION: Chest Pain (Mid chest, onset this AM with deep breathing. Pt states \"I can't get a deep breath in\")      HPI: Mr. Michelle Goldman is a 80 y.o. male who was admitted to the hospital with a 2 days history of progressive increased shortness of breath. 1-Danny Rangel is 80 y.o. male with extensive cardiac history that include history of myocardial infarction status post bypass surgery in , long-standing history of atrial fibrillation and history of abdominal aortic aneurysm status post repair.       2-Patient has long-standing history of atrial fibrillation that started after his bypass surgery, has been tried on multiple antiarrhythmic medications including sotalol which failed to control his atrial fibrillation, dofetilide 125 mg which has been discontinued at the time of his nephrectomy. He also has been tried on amiodarone but could not tolerate it because of side effect (lung toxicity).    3-Patient was reloaded with Tikosyn, which controlled his atrial fibrillation only partially. Patient was sent to [facility] for ablation but because of his age and his ling history of A Fib, they thought the success rate will not be that high. Decision is made to go for rate control and pacemaker placement. Patient underwent the procedure on 2016.      4-He was admitted for a low hemoglobin count (2018- 7.4) anemia secondary to acute blood loss from GI bleeding. His Xarelto is on hold.      5-Echocardiogram: 2018: EF 55% with moderate mitral regurgitation.      6-Cardiovascular stress test: 2018: Normal myocardial perfusion.      Pacemaker Interrogation on 10/24/18: Chronic atrial fibrillation with controlled ventricular response. Baseline pacing rate increased to 70 bpm.      EKG done on 10/24/18- Atrial fibrillation with demand ventricular pacing.     Xarelto is on hold due to history of anemia requiring transfusion, as of today his hemoglobin is 13.5 g/dl. Mr. Alessia Welsh was admitted form the emergency room with shortness of breath and chest pain with deep breathing. Chest x-ray in the emergency room showed moderate to large left-sided pleural effusion  S/P ultrasound-guided thoracentesis. Patient said his chest pain started yesterday, he was expressing some weed killer and felt short of breath. He slept okay overnight but this morning he has worsening shortness of breath at rest.  He is describing sharp chest pain with deep breathing. No significant radiation. No sweating or diaphoresis. No significant weight gain or weight loss. No problems with medications. Initial blood work reviewed, troponin is negative x1. Past Medical History:   Diagnosis Date    Abnormal ultrasound of lower extremity 7/22/15    Moderate to severe bilateral,multifocal,arterial insufficiency in  both legs. Minimal inflow component. Claudication present during exercise componet with worsenind NIVIA on the left post-exercise.  Atrial fibrillation [facility])     s/p pacemaker placement due to failure of multiple rhythm control strategies    Bladder cancer ([address]     CAD (coronary artery disease)     Dyspnea on exertion 5/7/2019    H/O echocardiogram 04/19/2018    EF 55%. LV cavity size is within normal limits,LV wall thickness is mildly increased. No definite specific wall motion abnormalities were identified. Mitral annular calcification. Myxomatous thickening of the mitral leaflets. Moderate mitral regurg. rhythm of what appeared to be atrial fib.     H/O total cystectomy 2/5/2019    For bladder CA in 2006, has an ileal conduit    History of abdominal aortic aneurysm (AAA) 2/5/2019    S/p repair 1993     History of bleeding ulcers     duodenal puff into the lungs every morning 1 each 11    torsemide (DEMADEX) 10 MG tablet Take 1 tablet by mouth every other day (Patient taking differently: Take 10 mg by mouth daily ) 90 tablet 3    allopurinol (ZYLOPRIM) 100 MG tablet Take 1 tablet by mouth daily 90 tablet 3    pantoprazole (PROTONIX) 40 MG tablet TAKE 1 TABLET BY MOUTH EVERY DAY 30 tablet 10    aspirin 81 MG EC tablet Take 81 mg by mouth daily      metoprolol succinate (TOPROL XL) 100 MG extended release tablet Take 1 tablet by mouth 2 times daily 180 tablet 3    glimepiride (AMARYL) 2 MG tablet Take 2 mg by mouth daily       Coenzyme Q10 (CO Q-10) 400 MG CAPS Take 400 mg by mouth 2 times daily       Probiotic Product (PROBIOTIC DAILY PO) Take 1 tablet by mouth daily      simvastatin (ZOCOR) 40 MG tablet Take 20 mg by mouth nightly       pioglitazone (ACTOS) 30 MG tablet Take 30 mg by mouth daily      Multiple Vitamins-Minerals (CENTRUM SILVER) TABS Take 1 tablet by mouth daily            HYDROcodone-acetaminophen (NORCO) 5-325 MG per tablet 1 tablet Q4H PRN   albuterol (PROVENTIL) nebulizer solution 2.5 mg Q6H PRN   albuterol sulfate  (90 Base) MCG/ACT inhaler 2 puff 4x Daily PRN   allopurinol (ZYLOPRIM) tablet 100 mg Daily   aspirin EC tablet 81 mg Daily   glimepiride (AMARYL) tablet 2 mg Daily   metoprolol succinate (TOPROL XL) extended release tablet 100 mg BID   pantoprazole (PROTONIX) tablet 40 mg Daily   pioglitazone (ACTOS) tablet 30 mg Daily   simvastatin (ZOCOR) tablet 20 mg Nightly   [START ON 6/18/2019] torsemide (DEMADEX) tablet 10 mg Every Other Day   sodium chloride flush 0.9 % injection 10 mL 2 times per day   sodium chloride flush 0.9 % injection 10 mL PRN   ondansetron (ZOFRAN) injection 4 mg Q6H PRN   polyethylene glycol (GLYCOLAX) packet 17 g Daily PRN   potassium chloride (KLOR-CON M) extended release tablet 40 mEq PRN   Or    potassium bicarb-citric acid (EFFER-K) effervescent tablet 40 mEq PRN   Or    potassium chloride 10 mEq/100 mL IVPB (Peripheral Line) PRN   magnesium sulfate 1 g in dextrose 5% 100 mL IVPB PRN   acetaminophen (TYLENOL) tablet 650 mg Q4H PRN   insulin lispro (HUMALOG) injection pen 0-12 Units TID WC   insulin lispro (HUMALOG) injection pen 0-6 Units Nightly   glucose (GLUTOSE) 40 % oral gel 15 g PRN   dextrose 50 % IV solution PRN   glucagon (rDNA) injection 1 mg PRN   dextrose 5 % solution PRN   [START ON 6/18/2019] pneumococcal 13-valent conjugate (PREVNAR) injection 0.5 mL Once   glycopyrrolate-formoterol (BEVESPI) 9-4.8 MCG/ACT inhaler 2 puff BID        FAMILY HISTORY: Family history reviewed, noncontributory. Patient is known to have coronary artery disease and chronic atrial fibrillation. PHYSICAL EXAM:   BP (!) 172/96   Pulse 78   Temp 98.3 °F (36.8 °C) (Temporal)   Resp 16   Ht 5' 7\" (1.702 m)   Wt 195 lb 9 oz (88.7 kg)   SpO2 95%   BMI 30.63 kg/m²  Body mass index is 30.63 kg/m². Constitutional: He is oriented to person, place, and time. He appears well-developed and well-nourished. In no acute distress. HEENT: Normocephalic and atraumatic. No JVD present. Carotid bruit is not present. No mass and no thyromegaly present. No lymphadenopathy present. Cardiovascular: Normal rate, regularly irregular rhythm, normal heart sounds. Exam reveals no gallop and no friction rubs. 2/6 systolic murmur, 4th intercostal space on the LEFT must lateral to the sternal border. Pulmonary/Chest: Effort normal and breath sounds normal. No respiratory distress. He has no wheezes, rhonchi or rales. Abdominal: Soft, non-tender. Bowel sounds and aorta are normal. He exhibits no organomegaly, mass or bruit. Extremities: Trace. No cyanosis or clubbing. 2+ radial and carotid pulses. Distal extremity pulses: 2+ bilaterally. .  Neurological: He is alert and oriented to person, place, and time. No evidence of gross cranial nerve deficit. Coordination appeared normal.   Skin: Skin is warm and dry.  There is no rash or diaphoresis. Psychiatric: He has a normal mood and affect. His speech is normal and behavior is normal.      MOST RECENT LABS ON RECORD:   Lab Results   Component Value Date    WBC 8.3 06/17/2019    HGB 13.5 06/17/2019    HCT 44.3 06/17/2019     06/17/2019    ALT 8 06/17/2019    AST 16 06/17/2019     06/17/2019    K 4.2 06/17/2019     06/17/2019    CREATININE 1.43 (H) 06/17/2019    BUN 22 06/17/2019    CO2 27 06/17/2019    TSH 1.66 10/24/2018    INR 1.1 06/17/2019    LABA1C 5.8 10/23/2017        ASSESSMENT:  Patient Active Problem List    Diagnosis Date Noted    Pleural effusion on left 06/17/2019    Dyspnea on exertion 05/07/2019    H/O total cystectomy 02/05/2019    History of abdominal aortic aneurysm (AAA) 02/05/2019    GI bleed 05/04/2018    Hemorrhagic disorder due to extrinsic circulating anticoagulants ([address] 05/04/2018    Anemia due to blood loss, acute 05/03/2018    Atrial fibrillation (HCC)     CAD (coronary artery disease)     Polymyalgia rheumatica (HCC)     History of bleeding ulcers     Malignant neoplasm of urinary bladder ([address] 12/14/2016    Pacemaker 11/07/2016    Solitary right kidney 08/22/2016    Type 2 diabetes mellitus ([address] 06/13/2016    Chronic diastolic CHF (congestive heart failure) ([address]     Essential hypertension     Chronic kidney disease, stage 3 ([address] 12/02/2015    Coronary artery disease involving native coronary artery of native heart with angina pectoris ([address] 11/30/2015    S/P CABG (coronary artery bypass graft) 11/30/2015    Arteriosclerotic retinopathy 11/30/2015       PLAN:     Acute on chronic diastolic heart failure: New York Heart Association Class: IIa (Mild symptoms only in normal activities) vs Pneumonia    Beta Blocker: Continue Toprol  mg 2 times daily    ACE Inibitor/ARB: Not on any because of chronic kidney disease stage III-IV. Patient has single kidney.    Diuretics: Increase Demadex to 20 mg daily.   Closely monitor kidney function and electrolytes.  Daily weight, fluid restriction and low-salt diet. Atherosclerotic Heart Disease: CABG   Antiplatelet Agent: Continue Aspirin 81 mg daily. Beta Blocker: Continue Toprol  mg 2 times daily   Cholesterol Reduction Therapy: Continue simvastatin (Zocor) 20 mg daily. Since his shortness of breath is very well explained by the left-sided pleural effusion and also because his chest pain is pleuritic in nature. I will hold off ischemic work-up at this point. Chronic Atrial Fibrillation: Rate Control  Beta Blocker: Continue metoprolol succinate (Toprol XL) 100 mg twice daily. Stroke Risk: His CHADS2-VASc score is greater than 2 (2.2% stroke risk)  Anticoagulation: Not indicated due to bleeding issues. History of intolerance to renally adjusted dose of Xarelto. He had an upper and lower endoscopy done and is seeing hematologist at [facility].  He was considered for capsule endoscopy but this procedure is on hold since his hemoglobin is stable. Additional Testing: I will interrogate his pacemaker tomorrow morning to make sure his ventricular rate is controlled. · Left-sided pleural effusion:  · Please consider CT of the chest to rule out airspace disease. · Follow-up pleural fluid analysis. Dual Chamber Pacemaker:  Indication for Device Placement: Tachycardia-Bradycardia Syndrome   Interrogation Findings: I will plan to check this tomorrow. Once again, thank you for allowing me to participate in this patients care. Please do not hesitate to contact me if I could be of any further assistance. Sincerely,  Cortez Gupta MD, F.A.C.C. [facility]    [address] [facility], [address] [place] [address]  Phone: [phone], Fax: [phone]     I believe that the risk of significant morbidity and mortality related to the patient's current medical conditions are: intermediate-high.       The documentation recorded by the scribe, accurately and completely reflects the services I personally performed and the decisions made by me. Melyssa Rosales MD, F.A.C.C.  June 17, 2019

## 2019-06-17 NOTE — PROGRESS NOTES
uninterpretable at baseline because of demand ventricular pacing. Low risk for significant CAD.  Hypertension     Pacemaker 11/07/2016    [employer]     Polymyalgia rheumatica [facility])        PATIENT ASSESSMENT    LABORATORY DATA  Hematology:   Lab Results   Component Value Date    WBC 8.3 06/17/2019    RBC 5.13 06/17/2019    HGB 13.5 06/17/2019    HCT 44.3 06/17/2019     06/17/2019     Chemistry:    Lab Results   Component Value Date    PHART 7.371 04/26/2018    XGE2EXX 34.4 04/26/2018    PO2ART 91.8 04/26/2018    V1YJPSDQ 96.9 04/26/2018    FEI6JLW 19.5 04/26/2018    PBEA NOT REPORTED 04/26/2018       Blood Culture:   Sputum Culture:     VITALS  Pulse: 78   Resp: 16  BP: (!) 172/96  SpO2: 95 % O2 Device: None (Room air)  Temp: 98.3 °F (36.8 °C)  Comment:   SKIN COLOR  [] Normal  [] Pale  [] Dusky  [] Cyanotic      RESPIRATORY PATTERN  [] Normal  [] Dyspnea  [] Cheyne-Lundberg  [] Kussmaul  [] Biots  AMBULATORY  [] Yes  [] No  [] With Assistance    PEAK FLOW  Predicted:     Personal Best:        VITAL CAPACITY  Predicted value:  ml  Actual Value:  ml  30% of Predicted:  ml  Patient Acuity 0 1 2 3 4 Score   Level of Concious (LOC) [x]  Alert & Oriented or Pt normal LOC []  Confused;follows directions []  Confused & uncooper-ative []  Obtunded []  Comatose 0   Respiratory Rate  (RR) [x]  Reg. rate & pattern. 12 - 20 bpm  []  Increased RR. Greater than 20 bpm   []  SOB w/ exertion or RR greater than 24 bpm []  Access- ory muscle use at rest. Abn.  resp. []  SOB at rest.   0   Bilateral Breath Sounds (BBS) [x]  Clear []  Diminish-ed bases  []  Diminish-ed t/o, or rales   []  Sporadic, scattered wheezes or rhonchi []  Persistentwheezes and, or absent BBS 0   Cough [x]  Strong, effective, & non-prod. []  Effective & prod. Less than 25 ml (2 TBSP) over past 24 hrs []  Ineffective & non-prod to less than 25 ML over past 24 hrs []  Ineffective and, or greater than 25 ml sputum prod. past 24 hrs.  []  Nonspon- taneous; spacer TID Albuterol and PRNq4 hrs. If unable to utilize MDI: HHN [physician-ordered bronchodilator(s)] TID and Albuterol PRN q4 hrs. Notify physician if condition deteriorates. MDI THERAPY with  [physician-ordered bronchodilator(s)] via spacer TID PRN. If unable to utilize MDI: HHN [physician-ordered bronchodilator(s)] TID PRN. Notify physician if condition deteriorates. If Acuity Level is 2, 3, or 4 in any of the following:    [] COUGH     [] SURGICAL HISTORY (SURG HX)  [] CHEST XRAY (CXR)    Goal: Improvement in sputum mobilization in patients with ineffective airway clearance. Reverse atelectasis. [] Bronchopulmonary Hygiene Protocol    Total Acuity:   16-32  []  Secondary Assessment in 24 hrs Total Acuity:  9-15  []  Secondary Assessment in 24 hrs Total Acuity:  4-8  []  Secondary Assessment in 48 hrs Total Acuity:  0-3  []  Secondary Assessment in 72 hrs   METANEB QID with [physician-ordered bronchodilator(s)] if CXR Acuity = 4; otherwise:  PD&P, PEP, or Vest QID & PRN  NT Sxn PRN for ineffective cough  METANEB QID with [physician-ordered bronchodilator(s)] if CXR Acuity = 4; otherwise:  PD&P, PEP, or Vest TID & PRN  NT Sxn PRN for ineffective cough  Instruct patient to self-perform IS q1hr WA   Directed Cough self-performed q1hr WA     If Acuity Level is 2 or above in the following:    [] PULMONARY HISTORY (PULM HX)    Goal: Assist patient in quitting smoking to slow or stop the progression of lung disease.     [] Smoking Cessation Protocol    SMOKING CESSATION EDUCATION provided according to policy PZ_891: (magaly with an X)  ____Yes    ____ No     ____ NA    Smoking Cessation Booklet given:  ____Yes  ____No ____Patient Kasey Coffee

## 2019-06-17 NOTE — ED NOTES
1% lidocaine injected into area per Dr Brisa Figueredo for local anesthetic into left back. Catheter inserted into area and connected to drainage cannister. Drained 1150 ml of yellow serous fluid. Catheter removed bandaid applied. Patient tolerated procedure well. Admits he can breathe deeper now.      Walt Hicks RN  06/17/19 [id number]

## 2019-06-18 ENCOUNTER — APPOINTMENT (OUTPATIENT)
Dept: CT IMAGING | Age: 84
End: 2019-06-18
Payer: MEDICARE

## 2019-06-18 VITALS
RESPIRATION RATE: 20 BRPM | DIASTOLIC BLOOD PRESSURE: 66 MMHG | SYSTOLIC BLOOD PRESSURE: 118 MMHG | OXYGEN SATURATION: 96 % | TEMPERATURE: 97.3 F | HEIGHT: 67 IN | HEART RATE: 76 BPM | WEIGHT: 195.56 LBS | BODY MASS INDEX: 30.69 KG/M2

## 2019-06-18 PROBLEM — D50.0 IRON DEFICIENCY ANEMIA DUE TO CHRONIC BLOOD LOSS: Status: ACTIVE | Noted: 2019-06-18

## 2019-06-18 LAB
ABSOLUTE EOS #: 0.06 K/UL (ref 0–0.44)
ABSOLUTE IMMATURE GRANULOCYTE: 0.03 K/UL (ref 0–0.3)
ABSOLUTE LYMPH #: 2.02 K/UL (ref 1.1–3.7)
ABSOLUTE MONO #: 0.77 K/UL (ref 0.1–1.2)
ALBUMIN SERPL-MCNC: 3 G/DL (ref 3.5–5.2)
ALBUMIN/GLOBULIN RATIO: 1.2 (ref 1–2.5)
ALP BLD-CCNC: 94 U/L (ref 40–129)
ALT SERPL-CCNC: <5 U/L (ref 5–41)
ANION GAP SERPL CALCULATED.3IONS-SCNC: 10 MMOL/L (ref 9–17)
AST SERPL-CCNC: 11 U/L
BASOPHILS # BLD: 0 % (ref 0–2)
BASOPHILS ABSOLUTE: 0.03 K/UL (ref 0–0.2)
BILIRUB SERPL-MCNC: 0.7 MG/DL (ref 0.3–1.2)
BUN BLDV-MCNC: 26 MG/DL (ref 8–23)
BUN/CREAT BLD: 17 (ref 9–20)
CALCIUM SERPL-MCNC: 8.6 MG/DL (ref 8.6–10.4)
CASE NUMBER:: NORMAL
CHLORIDE BLD-SCNC: 102 MMOL/L (ref 98–107)
CO2: 25 MMOL/L (ref 20–31)
CREAT SERPL-MCNC: 1.5 MG/DL (ref 0.7–1.2)
DIFFERENTIAL TYPE: ABNORMAL
DIRECT EXAM: NORMAL
EKG ATRIAL RATE: 80 BPM
EKG Q-T INTERVAL: 390 MS
EKG QRS DURATION: 82 MS
EKG QTC CALCULATION (BAZETT): 441 MS
EKG R AXIS: 15 DEGREES
EKG T AXIS: -64 DEGREES
EKG VENTRICULAR RATE: 77 BPM
EOSINOPHILS RELATIVE PERCENT: 1 % (ref 1–4)
ESTIMATED AVERAGE GLUCOSE: 126 MG/DL
GFR AFRICAN AMERICAN: 54 ML/MIN
GFR NON-AFRICAN AMERICAN: 44 ML/MIN
GFR SERPL CREATININE-BSD FRML MDRD: ABNORMAL ML/MIN/{1.73_M2}
GFR SERPL CREATININE-BSD FRML MDRD: ABNORMAL ML/MIN/{1.73_M2}
GLUCOSE BLD-MCNC: 128 MG/DL (ref 74–100)
GLUCOSE BLD-MCNC: 83 MG/DL (ref 70–99)
GLUCOSE BLD-MCNC: 85 MG/DL (ref 74–100)
HBA1C MFR BLD: 6 % (ref 4.8–5.9)
HCT VFR BLD CALC: 38.4 % (ref 40.7–50.3)
HEMOGLOBIN: 11.5 G/DL (ref 13–17)
IMMATURE GRANULOCYTES: 0 %
IRON: 19 UG/DL (ref 59–158)
LYMPHOCYTES # BLD: 27 % (ref 24–43)
Lab: NORMAL
MCH RBC QN AUTO: 26 PG (ref 25.2–33.5)
MCHC RBC AUTO-ENTMCNC: 29.9 G/DL (ref 28.4–34.8)
MCV RBC AUTO: 86.7 FL (ref 82.6–102.9)
MONOCYTES # BLD: 10 % (ref 3–12)
NRBC AUTOMATED: 0 PER 100 WBC
PDW BLD-RTO: 19.9 % (ref 11.8–14.4)
PLATELET # BLD: ABNORMAL K/UL (ref 138–453)
PLATELET ESTIMATE: ABNORMAL
PLATELET, FLUORESCENCE: 125 K/UL (ref 138–453)
PLATELET, IMMATURE FRACTION: 2.1 % (ref 1.1–10.3)
PMV BLD AUTO: ABNORMAL FL (ref 8.1–13.5)
POTASSIUM SERPL-SCNC: 3.7 MMOL/L (ref 3.7–5.3)
RBC # BLD: 4.43 M/UL (ref 4.21–5.77)
RBC # BLD: ABNORMAL 10*6/UL
SEDIMENTATION RATE, ERYTHROCYTE: 15 MM (ref 0–20)
SEG NEUTROPHILS: 61 % (ref 36–65)
SEGMENTED NEUTROPHILS ABSOLUTE COUNT: 4.46 K/UL (ref 1.5–8.1)
SODIUM BLD-SCNC: 137 MMOL/L (ref 135–144)
SPECIMEN DESCRIPTION: NORMAL
SPECIMEN DESCRIPTION: NORMAL
TOTAL PROTEIN: 5.5 G/DL (ref 6.4–8.3)
WBC # BLD: 7.4 K/UL (ref 3.5–11.3)
WBC # BLD: ABNORMAL 10*3/UL

## 2019-06-18 PROCEDURE — 99232 SBSQ HOSP IP/OBS MODERATE 35: CPT | Performed by: INTERNAL MEDICINE

## 2019-06-18 PROCEDURE — 80053 COMPREHEN METABOLIC PANEL: CPT

## 2019-06-18 PROCEDURE — 6370000000 HC RX 637 (ALT 250 FOR IP): Performed by: INTERNAL MEDICINE

## 2019-06-18 PROCEDURE — 6370000000 HC RX 637 (ALT 250 FOR IP): Performed by: PHYSICIAN ASSISTANT

## 2019-06-18 PROCEDURE — 85025 COMPLETE CBC W/AUTO DIFF WBC: CPT

## 2019-06-18 PROCEDURE — 83540 ASSAY OF IRON: CPT

## 2019-06-18 PROCEDURE — 2580000003 HC RX 258: Performed by: PHYSICIAN ASSISTANT

## 2019-06-18 PROCEDURE — 71250 CT THORAX DX C-: CPT

## 2019-06-18 PROCEDURE — 82947 ASSAY GLUCOSE BLOOD QUANT: CPT

## 2019-06-18 PROCEDURE — 93010 ELECTROCARDIOGRAM REPORT: CPT | Performed by: INTERNAL MEDICINE

## 2019-06-18 PROCEDURE — 94664 DEMO&/EVAL PT USE INHALER: CPT

## 2019-06-18 PROCEDURE — 83036 HEMOGLOBIN GLYCOSYLATED A1C: CPT

## 2019-06-18 PROCEDURE — 36415 COLL VENOUS BLD VENIPUNCTURE: CPT

## 2019-06-18 PROCEDURE — G0378 HOSPITAL OBSERVATION PER HR: HCPCS

## 2019-06-18 PROCEDURE — 94640 AIRWAY INHALATION TREATMENT: CPT

## 2019-06-18 PROCEDURE — 85651 RBC SED RATE NONAUTOMATED: CPT

## 2019-06-18 RX ORDER — TORSEMIDE 20 MG/1
20 TABLET ORAL DAILY
Qty: 30 TABLET | Refills: 0 | Status: SHIPPED | OUTPATIENT
Start: 2019-06-18 | End: 2019-10-28 | Stop reason: SDUPTHER

## 2019-06-18 RX ORDER — TORSEMIDE 20 MG/1
20 TABLET ORAL DAILY
Status: DISCONTINUED | OUTPATIENT
Start: 2019-06-18 | End: 2019-06-18 | Stop reason: HOSPADM

## 2019-06-18 RX ADMIN — HYDROCODONE BITARTRATE AND ACETAMINOPHEN 1 TABLET: 5; 325 TABLET ORAL at 03:24

## 2019-06-18 RX ADMIN — PIOGLITAZONE 30 MG: 15 TABLET ORAL at 09:18

## 2019-06-18 RX ADMIN — METOPROLOL SUCCINATE 100 MG: 100 TABLET, EXTENDED RELEASE ORAL at 09:18

## 2019-06-18 RX ADMIN — PANTOPRAZOLE SODIUM 40 MG: 40 TABLET, DELAYED RELEASE ORAL at 09:18

## 2019-06-18 RX ADMIN — Medication 10 ML: at 09:19

## 2019-06-18 RX ADMIN — TORSEMIDE 20 MG: 20 TABLET ORAL at 09:18

## 2019-06-18 RX ADMIN — GLIMEPIRIDE 2 MG: 1 TABLET ORAL at 09:18

## 2019-06-18 RX ADMIN — GLYCOPYRROLATE AND FORMOTEROL FUMARATE 2 PUFF: 9; 4.8 AEROSOL, METERED RESPIRATORY (INHALATION) at 09:55

## 2019-06-18 RX ADMIN — ASPIRIN 81 MG: 81 TABLET ORAL at 09:18

## 2019-06-18 RX ADMIN — ALLOPURINOL 100 MG: 100 TABLET ORAL at 09:18

## 2019-06-18 ASSESSMENT — PAIN SCALES - GENERAL
PAINLEVEL_OUTOF10: 0
PAINLEVEL_OUTOF10: 0

## 2019-06-18 NOTE — PLAN OF CARE
Problem: Falls - Risk of:  Goal: Will remain free from falls  Description  Will remain free from falls  Outcome: Ongoing  Note:   Call light in reach at all times, frequent checks, bed in lowest position, wheels of bed and chair locked, non skid footwear on, appropriate transfer techniques, personal items within reach, walkways free of obstructions, fall risk armband and sign displayed, Mensah fall risk score per protocol. No falls this shift, will continue to monitor. Problem: Infection:  Goal: Will remain free from infection  Description  Will remain free from infection  Outcome: Ongoing  Note:   Monitor lab work. IV antibiotics per orders. Will continue to monitor. Problem: Safety:  Goal: Free from accidental physical injury  Description  Free from accidental physical injury  Outcome: Ongoing  Note:   Patient assessed for fall risk and fall precautions initiated as needed. Patient instructed about safety devices and allowed to make decisions related to safey. Environment kept free of clutter and adequate lighting provided. Bed in lowest position with brakes locked. Call light in reach. Patient ID band correct and in place. Patient transferred with appropriate methods. Patient free of falls during shift. Will continue to monitor.

## 2019-06-18 NOTE — PROGRESS NOTES
Dennie Pilgrim am scribing for and in the presence of Sergei Ham MD, F.A.C.C..    Patient: Medhat Bellamy  : 1931  Date of Admission: 2019  Primary Care Physician: Janell Orozco  Today's Date: 2019    REASON FOR CONSULTATION: Chest Pain (Mid chest, onset this AM with deep breathing. Pt states \"I can't get a deep breath in\")      HPI: Mr. Wade Craig is a 80 y.o. male who was admitted to the hospital with a 2 days history of progressive increased shortness of breath. 1-Danny Rangel is 80 y.o. male with extensive cardiac history that include history of myocardial infarction status post bypass surgery in , long-standing history of atrial fibrillation and history of abdominal aortic aneurysm status post repair.       2-Patient has long-standing history of atrial fibrillation that started after his bypass surgery, has been tried on multiple antiarrhythmic medications including sotalol which failed to control his atrial fibrillation, dofetilide 125 mg which has been discontinued at the time of his nephrectomy. He also has been tried on amiodarone but could not tolerate it because of side effect (lung toxicity).    3-Patient was reloaded with Tikosyn, which controlled his atrial fibrillation only partially. Patient was sent to [facility] for ablation but because of his age and his ling history of A Fib, they thought the success rate will not be that high. Decision is made to go for rate control and pacemaker placement. Patient underwent the procedure on 2016.      4-He was admitted for a low hemoglobin count (2018- 7.4) anemia secondary to acute blood loss from GI bleeding. His Xarelto is on hold.      5-Echocardiogram: 2018: EF 55% with moderate mitral regurgitation.      6-Cardiovascular stress test: 2018: Normal myocardial perfusion.      Pacemaker Interrogation on 10/24/18: Chronic atrial fibrillation with controlled ventricular response. Baseline pacing rate increased to 70 bpm.      EKG done on 10/24/18- Atrial fibrillation with demand ventricular pacing.     Xarelto is on hold due to history of anemia requiring transfusion, as of today his hemoglobin is 13.5 g/dl. Mr. Kartik Sanches was admitted form the emergency room with shortness of breath and chest pain with deep breathing. Chest x-ray in the emergency room showed moderate to large left-sided pleural effusion  S/P ultrasound-guided thoracentesis. He is doing very good today. CT of the chest done, no underlying mass or significant infiltrate. He denies any fever or cough. No further chest pain. Past Medical History:   Diagnosis Date    Abnormal ultrasound of lower extremity 7/22/15    Moderate to severe bilateral,multifocal,arterial insufficiency in  both legs. Minimal inflow component. Claudication present during exercise componet with worsenind NIVIA on the left post-exercise.  Atrial fibrillation [facility])     s/p pacemaker placement due to failure of multiple rhythm control strategies    Bladder cancer ([facility] [address]     CAD (coronary artery disease)     Dyspnea on exertion 5/7/2019    H/O echocardiogram 04/19/2018    EF 55%. LV cavity size is within normal limits,LV wall thickness is mildly increased. No definite specific wall motion abnormalities were identified. Mitral annular calcification. Myxomatous thickening of the mitral leaflets. Moderate mitral regurg. rhythm of what appeared to be atrial fib.  H/O total cystectomy 2/5/2019    For bladder CA in 2006, has an ileal conduit    History of abdominal aortic aneurysm (AAA) 2/5/2019    S/p repair 1993     History of bleeding ulcers     duodenal    History of Holter monitoring 9/21/15    atrial fibrillation/flutter throughout with an average HR of 67 bpm. 78 pauses >2 seconds and 1 pause >3 seconds,but all occured during typical hours of sleep.     History of Holter monitoring 5/27/16    Paroxysmal atrial fib with rapid ventricular response (A-Fib 38% of the time). Occasional isolated PVC's    History of Holter monitoring 08/09/2016    Atrial Fibrillation throughout with heart rates ranging between  bpm.  Four 2 second pauses. Occasional PVC's    History of stress test 04/25/2018    Normal. LV systolic function cannot be assessed because of no gating. ECG is uninterpretable at baseline because of demand ventricular pacing. Low risk for significant CAD.  Hypertension     Pacemaker 11/07/2016    [employer]tr[employer]     Polymyalgia rheumatica (HCC)        CURRENT ALLERGIES: Neomycin-bacitracin zn-polymyx; Bacitracin; and Penicillins REVIEW OF SYSTEMS: 14 systems were reviewed. Pertinent positives and negatives as above, all else negative.      Past Surgical History:   Procedure Laterality Date    ABDOMINAL AORTIC ANEURYSM [address] [facility]    APPENDECTOMY      BLADDER SURGERY      CATARACT REMOVAL Bilateral     CHOLECYSTECTOMY      CORONARY ARTERY BYPASS GRAFT  1995    [facility]    KIDNEY [facility]AL Left 2015    [facility]    PACEMAKER INSERTION      PROSTATECTOMY      TONSILLECTOMY AND ADENOIDECTOMY      Social History:  Social History     Tobacco Use    Smoking status: Former Smoker     Packs/day: 1.00     Years: 50.00     Pack years: 50.00     Types: Cigarettes, Pipe    Smokeless tobacco: Never Used   Substance Use Topics    Alcohol use: No     Alcohol/week: 0.0 oz    Drug use: No        CURRENT MEDICATIONS:  Outpatient Medications Marked as Taking for the 6/17/19 encounter [facility] Encounter)   Medication Sig Dispense Refill    torsemide (DEMADEX) 20 MG tablet Take 1 tablet by mouth daily 30 tablet 0    umeclidinium-vilanterol (ANORO ELLIPTA) 62.5-25 MCG/INH AEPB inhaler Inhale 1 puff into the lungs every morning 1 each 11    allopurinol (ZYLOPRIM) 100 MG tablet Take 1 tablet by mouth daily 90 tablet 3    pantoprazole (PROTONIX) 40 MG tablet TAKE 1 TABLET BY MOUTH EVERY DAY 30 dextrose 5 % solution PRN   pneumococcal 13-valent conjugate (PREVNAR) injection 0.5 mL Once   glycopyrrolate-formoterol (BEVESPI) 9-4.8 MCG/ACT inhaler 2 puff BID   metoprolol (LOPRESSOR) injection 5 mg Q6H PRN        FAMILY HISTORY: Family history reviewed, noncontributory. Patient is known to have coronary artery disease and chronic atrial fibrillation. PHYSICAL EXAM:   /66   Pulse 76   Temp 97.3 °F (36.3 °C) (Temporal)   Resp 20   Ht 5' 7\" (1.702 m)   Wt 195 lb 9 oz (88.7 kg)   SpO2 96%   BMI 30.63 kg/m²  Body mass index is 30.63 kg/m². Constitutional: He is oriented to person, place, and time. He appears well-developed and well-nourished. In no acute distress. HEENT: Normocephalic and atraumatic. No JVD present. Carotid bruit is not present. No mass and no thyromegaly present. No lymphadenopathy present. Cardiovascular: Normal rate, regularly irregular rhythm, normal heart sounds. Exam reveals no gallop and no friction rubs. 2/6 systolic murmur, 4th intercostal space on the LEFT must lateral to the sternal border. Pulmonary/Chest: Effort normal and breath sounds normal. No respiratory distress. He has no wheezes, rhonchi or rales. Abdominal: Soft, non-tender. Bowel sounds and aorta are normal. He exhibits no organomegaly, mass or bruit. Extremities: Trace. No cyanosis or clubbing. 2+ radial and carotid pulses. Distal extremity pulses: 2+ bilaterally. .  Neurological: He is alert and oriented to person, place, and time. No evidence of gross cranial nerve deficit. Coordination appeared normal.   Skin: Skin is warm and dry. There is no rash or diaphoresis. Psychiatric: He has a normal mood and affect.  His speech is normal and behavior is normal.      MOST RECENT LABS ON RECORD:   Lab Results   Component Value Date    WBC 7.4 06/18/2019    HGB 11.5 (L) 06/18/2019    HCT 38.4 (L) 06/18/2019    PLT See Reflexed IPF Result 06/18/2019    ALT <5 (L) 06/18/2019    AST 11 06/18/2019     06/18/2019    K 3.7 06/18/2019     06/18/2019    CREATININE 1.50 (H) 06/18/2019    BUN 26 (H) 06/18/2019    CO2 25 06/18/2019    TSH 1.66 10/24/2018    INR 1.1 06/17/2019    LABA1C 6.0 (H) 06/18/2019        ASSESSMENT:  Patient Active Problem List    Diagnosis Date Noted    Iron deficiency anemia due to chronic blood loss 06/18/2019    Pleural effusion on left 06/17/2019    Dyspnea on exertion 05/07/2019    H/O total cystectomy 02/05/2019    History of abdominal aortic aneurysm (AAA) 02/05/2019    GI bleed 05/04/2018    Hemorrhagic disorder due to extrinsic circulating anticoagulants ([facility] [address] 05/04/2018    Anemia due to blood loss, acute 05/03/2018    Atrial fibrillation (HCC)     CAD (coronary artery disease)     Polymyalgia rheumatica (HCC)     History of bleeding ulcers     Malignant neoplasm of urinary bladder ([facility] [address] 12/14/2016    Pacemaker 11/07/2016    Solitary right kidney 08/22/2016    Type 2 diabetes mellitus ([address] 06/13/2016    Chronic diastolic CHF (congestive heart failure) ([facility] [address]     Essential hypertension     Chronic kidney disease, stage 3 ([facility] [address] 12/02/2015    Coronary artery disease involving native coronary artery of native heart with angina pectoris ([facility] [address] 11/30/2015    S/P CABG (coronary artery bypass graft) 11/30/2015    Arteriosclerotic retinopathy 11/30/2015       PLAN:     Acute on chronic diastolic heart failure: New York Heart Association Class: IIa (Mild symptoms only in normal activities) vs Pneumonia    Beta Blocker: Continue Toprol  mg 2 times daily    ACE Inibitor/ARB: Not on any because of chronic kidney disease stage III-IV. Patient has single kidney.  Diuretics: Continue Demadex to 20 mg daily.  Closely monitor kidney function and electrolytes.  I plan to see him back in the office after 1 week. We will get him evaluated in the heart failure clinic.  I will get repeat blood work on follow-up.     Atherosclerotic Heart Disease: CABG Antiplatelet Agent: Continue Aspirin 81 mg daily. Beta Blocker: Continue Toprol  mg 2 times daily   Cholesterol Reduction Therapy: Continue simvastatin (Zocor) 20 mg daily. Since his shortness of breath is very well explained by the left-sided pleural effusion and also because his chest pain is pleuritic in nature. I will hold off ischemic work-up at this point. Chronic Atrial Fibrillation: Rate Control  Beta Blocker: Continue metoprolol succinate (Toprol XL) 100 mg twice daily. Stroke Risk: His CHADS2-VASc score is greater than 2 (2.2% stroke risk)  Anticoagulation: Not indicated due to bleeding issues. History of intolerance to renally adjusted dose of Xarelto. He had an upper and lower endoscopy done and is seeing hematologist at [facility].  He was considered for capsule endoscopy but this procedure is on hold since his hemoglobin is stable. · Left-sided pleural effusion:  · CT of the chest reviewed, no underlying masses. Possible infiltrate but patient is afebrile and his white blood cell count is normal.    Once again, thank you for allowing me to participate in this patients care. Please do not hesitate to contact me if I could be of any further assistance. Sincerely,  Sean Mckenzie MD, F.A.C.C. [facility] Cardiology Specialist    [address] [facility], [address] [place] [address]  Phone: [phone], Fax: [phone]     I believe that the risk of significant morbidity and mortality related to the patient's current medical conditions are: intermediate-high.

## 2019-06-18 NOTE — DISCHARGE SUMMARY
Discharge Summary    Medhat Bellamy  :  1931  MRN:  [id number]    Admit date:  2019      Discharge date: 2019     Admitting Physician:  Glennis Collet, MD    Consultants:  Dr. Markos Randhawa, cardiology    Procedures: Thoracentesis:left    Complications: none    Discharge Condition: stable    Hospital Course:   Medhat Bellamy is a 80 y.o. male admitted with SOB and anterior left chest tightness with deep breathing. PMH: PMR, h/o bladder CA s/p resection with complication leading to solitary kidney, CABG, pacer, DM ,CHF, CKD, CAD, HTN. He could not lay flat. In the ER he was found to have left pleural effusion. He was admitted for same. He was taken for thoracentesis with over 1L off. He instantly felt better. He was seen by Dr. Markos Randhawa as he is known to him. Diuresis was increased. CT was ordered to check for underlying pulmonary process. CT showed: \"Small bilateral pleural effusions with lower lobe atelectasis and likely small infiltrates, especially LLL. COPD/emphysema. Mild cardiomegaly.  Small posterior pericardial effusion. Prior granulomatous disease. Heavy vascular calcifications coronary arteries aorta. Large amount of residual food/debris in the stomach. \" Patient clinically improved. Patient needs to f/u with hematology to address Fe def and possible have video capsule study. F/U with Dr. Markos Randhawa 1 week. Discharge to home.         Exam:  GEN:  alert and oriented to person, place and time, well-developed and well-nourished, in no acute distress  EYES:  PERRL  NECK: normal, supple  PULM: dimished bilaterally- no wheezes, rales or rhonchi, no respiratory distress  COR:   regular rate & rhythm and no murmurs  ABD:    soft, non-tender, non-distended, normal bowel sounds, no masses or organomegaly  EXT:    edema: trace affecting bilateral foot  NEURO: follows commands, KELLY, no deficits  SKIN:   no rashes or significant lesions        Significant Diagnostic Studies:   Lab Results   Component Value Date    WBC 7.4 06/18/2019    HGB 11.5 (L) 06/18/2019    PLT See Reflexed IPF Result 06/18/2019       Lab Results   Component Value Date    BUN 26 (H) 06/18/2019    CREATININE 1.50 (H) 06/18/2019     06/18/2019    K 3.7 06/18/2019    CALCIUM 8.6 06/18/2019     06/18/2019    CO2 25 06/18/2019    LABGLOM 44 (L) 06/18/2019       Lab Results   Component Value Date    WBCUA 0 TO 2 07/01/2016    RBCUA 20 TO 50 07/01/2016    EPITHUA 0 TO 2 07/01/2016    LEUKOCYTESUR TRACE (A) 07/01/2016    SPECGRAV 1.013 07/01/2016    GLUCOSEU NEGATIVE 07/01/2016    KETUA NEGATIVE 07/01/2016    PROTEINU NEGATIVE 07/01/2016    HGBUR LARGE (A) 07/01/2016    CASTUA NOT REPORTED 07/01/2016    CRYSTUA NOT REPORTED 07/01/2016    BACTERIA MODERATE (A) 07/01/2016    YEAST NOT REPORTED 07/01/2016       Xr Chest (single View Frontal)    Result Date: 6/17/2019  EXAMINATION: ONE XRAY VIEW OF THE CHEST 6/17/2019 2:35 pm COMPARISON: 2 hours prior HISTORY: ORDERING SYSTEM PROVIDED HISTORY: S/P Thoracentesis TECHNOLOGIST PROVIDED HISTORY: S/P Thoracentesis FINDINGS: Cardiac silhouette is enlarged. Small left pleural effusion identified, significantly decreased in volume as compared to previous examination. No evidence for pneumothorax status post lower disease. Numerous calcifications are present throughout the lungs bilaterally, suggestive of remote granulomatous exposure. Atherosclerotic calcifications of the aortic arch. Cardiac device leads overlying the right atrium and ventricle remain unchanged. Right hemithorax is clear. Osseous structures and soft tissues are grossly intact. Cardiomegaly and generalized interstitial prominence. Small left pleural effusion, significantly decreased as compared to previous examination. No evidence for pneumothorax.      Xr Chest Standard (2 Vw)    Result Date: 6/17/2019  EXAMINATION: TWO XRAY VIEWS OF THE CHEST 6/17/2019 11:34 am COMPARISON: March 14, 2019 HISTORY: [address] PROVIDED HISTORY: Chest pain TECHNOLOGIST PROVIDED HISTORY: [id number] [address] to go to radiology without RN unless unstable vital signs or airway concerns Chest pain FINDINGS: Implanted cardiac device is present. Cardiac monitoring leads overlie the chest.  The heart is enlarged. Left-sided effusion with associated airspace disease, new from prior exam.  No pneumothorax. Clear right lung with mild central vascular congestion. Moderate to large left effusion with associated airspace disease. Ct Chest Wo Contrast    Result Date: 6/18/2019  EXAMINATION: CT OF THE CHEST WITHOUT CONTRAST 6/18/2019 12:51 pm TECHNIQUE: CT of the chest was performed without the administration of intravenous contrast. Multiplanar reformatted images are provided for review. Dose modulation, iterative reconstruction, and/or weight based adjustment of the mA/kV was utilized to reduce the radiation dose to as low as reasonably achievable. COMPARISON: Chest x-ray from 06/17/2019 HISTORY: ORDERING SYSTEM PROVIDED HISTORY: SHORTNESS OF BREATH History of anemia, coronary artery disease, congestive heart failure, chronic kidney disease, hypertension, and diabetes. FINDINGS: Mediastinum: Left subclavian approach PPM with 2 right heart electrode leads, in satisfactory position. Mildly enlarged cardiac chambers. Heavy coronary artery calcifications. Tiny mostly posterior pericardial effusion. Scattered small and a few borderline enlarged mediastinal nodes. No bulky lymphadenopathy. Heavy vascular calcification thoracic aorta, mostly arch and descending segments without aneurysm. Main pulmonary artery caliber WNL. Lungs/pleura: Small bilateral pleural effusions with basilar compressive atelectasis and some ground-glass opacity LLL; scattered calcified granulomas throughout. Mild centrilobular emphysema upper zones. Additional mild atelectatic or fibrotic changes lingula. No pneumothorax.  Upper Abdomen: Large amount of residual food/debris in the stomach. Unenhanced liver and spleen unremarkable. Heavy vascular calcifications. Soft Tissues/Bones: Moderate kyphoscoliosis, ossification anterior longitudinal ligament and extensive spondylosis compatible with DISH. Multilevel additional degenerative findings. Fusion manubriosternal junction. Small bilateral pleural effusions with lower lobe atelectasis and likely small infiltrates, especially LLL. COPD/emphysema. Mild cardiomegaly. Small posterior pericardial effusion. Prior granulomatous disease. Heavy vascular calcifications coronary arteries aorta. PPM. Large amount of residual food/debris in the stomach. Us Thoracentesis    Result Date: 6/17/2019  PROCEDURE: ULTRASOUNDGUIDED LEFT THORACENTESIS 6/17/2019 HISTORY: ORDERING SYSTEM PROVIDED HISTORY: left pleural effusion TECHNOLOGIST PROVIDED HISTORY: left pleural effusion TECHNIQUE: Informed consent was obtained after a detailed explanation of the procedure including risks, benefits, and alternatives. Universal protocol was performed. The right chest was prepped and draped in sterile fashion and local anesthesia was achieved with lidocaine. An 8 [place] needle sheath was advanced under ultrasound guidance into pleural effusion and thoracentesis was performed. The patient tolerated the procedure well. FINDINGS: A total of 1150 was removed. Successful ultrasound guided thoracentesis.        Discharge Diagnoses:    Principal Problem:    Pleural effusion on left  Active Problems:    S/P CABG (coronary artery bypass graft)    Chronic kidney disease, stage 3 (HCC)    Type 2 diabetes mellitus (HCC)    Chronic diastolic CHF (congestive heart failure) (HCC)    Essential hypertension    Solitary right kidney    Malignant neoplasm of urinary bladder (HCC)    CAD (coronary artery disease)    Pacemaker    Polymyalgia rheumatica (HCC)    History of bleeding ulcers    Iron deficiency anemia due to chronic blood loss  Resolved Problems:    * No resolved hospital problems. *      Active Hospital Problems    Diagnosis Date Noted    Iron deficiency anemia due to chronic blood loss [D50.0] 2019    Pleural effusion on left [J90] 2019    Polymyalgia rheumatica (HCC) [M35.3]     History of bleeding ulcers [Z87.11]     CAD (coronary artery disease) [I25.10]     Malignant neoplasm of urinary bladder ([facility] [C67.9] 2016    Pacemaker [Z95.0] 2016    Solitary right kidney [Q60.0] 2016    Type 2 diabetes mellitus ([facility] [E11.9] 2016    Essential hypertension [I10]     Chronic diastolic CHF (congestive heart failure) ([facility] [I50.32]     Chronic kidney disease, stage 3 ([facility] [N18.3] 2015    S/P CABG (coronary artery bypass graft) [Z95.1] 2015       Discharge Medications:        [employer]   Home Medication Instructions ZU    Printed on:[id number]   Medication Information                      acetaminophen-codeine (TYLENOL #3) 300-30 MG per tablet  TAKE ONE TABLET BY MOUTH EVERY 6 HOURS AS NEEDED FOR 1 WEEK             albuterol (PROVENTIL) (2.5 MG/3ML) 0.083% nebulizer solution  Take 3 mLs by nebulization every 6 hours as needed for Wheezing or Shortness of Breath             albuterol sulfate  (90 Base) MCG/ACT inhaler  Inhale 2 puffs into the lungs 4 times daily as needed for Wheezing             allopurinol (ZYLOPRIM) 100 MG tablet  Take 1 tablet by mouth daily             aspirin 81 MG EC tablet  Take 81 mg by mouth daily             Coenzyme Q10 (CO Q-10) 400 MG CAPS  Take 400 mg by mouth 2 times daily              glimepiride (AMARYL) 2 MG tablet  Take 2 mg by mouth daily              metoprolol succinate (TOPROL XL) 100 MG extended release tablet  Take 1 tablet by mouth 2 times daily             Multiple Vitamins-Minerals (CENTRUM SILVER) TABS  Take 1 tablet by mouth daily              pantoprazole (PROTONIX) 40 MG tablet  TAKE 1 TABLET BY MOUTH EVERY DAY             pioglitazone (ACTOS) 30 MG tablet  Take 30 mg by mouth daily             Probiotic Product (PROBIOTIC DAILY PO)  Take 1 tablet by mouth daily             simvastatin (ZOCOR) 40 MG tablet  Take 20 mg by mouth nightly              torsemide (DEMADEX) 20 MG tablet  Take 1 tablet by mouth daily             umeclidinium-vilanterol (ANORO ELLIPTA) 62.5-25 MCG/INH AEPB inhaler  Inhale 1 puff into the lungs every morning                 Patient Instructions:    Activity: activity as tolerated  Diet: cardiac diet  Wound Care: as directed  Other: none     Disposition:   Discharge to Home    Follow up:  Patient will be followed by Sealm Simons MD in 1 week  Dr. Erin Mariano 1 week    CORE MEASURES on Discharge (if applicable)  ACE/ARB in CHF: NA  Statin in [place]: NA  ASA in [place]: NA  Statin in CVA: NA  Antiplatelet in CVA: NA    Total time spent on discharge services: 40 minutes    Including the following activities:  Evaluation and Management of patient  Discussion with patient and/or surrogate about current care plan  Coordination with Case Management and/or   Coordination of care with Consultants (if applicable)   Coordination of care with Receiving Facility Physician (if applicable)  Completion of DME forms (if applicable)  Preparation of Discharge Summary  Preparation of Medication Reconciliation  Preparation of Discharge Prescriptions    Signed:  Minna Tate PA-C  6/18/2019, 3:27 PM

## 2019-06-18 NOTE — H&P
Hospital Medicine  History and Physical    Patient:  Chelsea Ortiz  MRN: [id number]    Chief Complaint:  SOB    History Obtained From:  patient    PCP: Eleni Santos MD    History of Present Illness: The patient is a 80 y.o. male who presents with SOB and anterior left chest tightness with deep breathing. PMH: PMR, h/o bladder CA s/p resection with complication leading to solitary kidney, CABG, pacer, DM ,CHF, CKD, CAD, HTN. He could not lay flat. In the ER he was found to have left pleural effusion. He was admitted for same. He was taken for thoracentesis with over 1L off. He instantly felt better. He was seen by Dr. Mulu Garg as he is known to him. Diuresis was increased. CT was ordered to check for underlying pulmonary process. Past Medical History:        Diagnosis Date    Abnormal ultrasound of lower extremity 7/22/15    Moderate to severe bilateral,multifocal,arterial insufficiency in  both legs. Minimal inflow component. Claudication present during exercise componet with worsenind NIVIA on the left post-exercise.  Atrial fibrillation [facility])     s/p pacemaker placement due to failure of multiple rhythm control strategies    Bladder cancer ([facility] [address]     CAD (coronary artery disease)     Dyspnea on exertion 5/7/2019    H/O echocardiogram 04/19/2018    EF 55%. LV cavity size is within normal limits,LV wall thickness is mildly increased. No definite specific wall motion abnormalities were identified. Mitral annular calcification. Myxomatous thickening of the mitral leaflets. Moderate mitral regurg. rhythm of what appeared to be atrial fib.     H/O total cystectomy 2/5/2019    For bladder CA in 2006, has an ileal conduit    History of abdominal aortic aneurysm (AAA) 2/5/2019    S/p repair 1993     History of bleeding ulcers     duodenal    History of Holter monitoring 9/21/15    atrial fibrillation/flutter throughout with an average HR of 67 bpm. 78 pauses >2 seconds and 1 pause >3 seconds,but all occured during typical hours of sleep.  History of Holter monitoring 5/27/16    Paroxysmal atrial fib with rapid ventricular response (A-Fib 38% of the time). Occasional isolated PVC's    History of Holter monitoring 08/09/2016    Atrial Fibrillation throughout with heart rates ranging between  bpm.  Four 2 second pauses. Occasional PVC's    History of stress test 04/25/2018    Normal. LV systolic function cannot be assessed because of no gating. ECG is uninterpretable at baseline because of demand ventricular pacing. Low risk for significant CAD.  Hypertension     Pacemaker 11/07/2016    [employer]     Polymyalgia rheumatica [facility])        Past Surgical History:        Procedure Laterality Date    ABDOMINAL AORTIC ANEURYSM [address] [facility]    APPENDECTOMY      BLADDER SURGERY      CATARACT REMOVAL Bilateral     CHOLECYSTECTOMY      CORONARY ARTERY BYPASS GRAFT  1995    x3 [facility]    KIDNEY REMOVAL Left 2015    [facility]    PACEMAKER INSERTION      PROSTATECTOMY      TONSILLECTOMY AND ADENOIDECTOMY         Family History:   History reviewed. No pertinent family history. Social History:   TOBACCO:   reports that he has quit smoking. His smoking use included cigarettes and pipe. He has a 50.00 pack-year smoking history. He has never used smokeless tobacco.  ETOH:   reports that he does not drink alcohol. OCCUPATION: PT    Allergies:  Neomycin-bacitracin zn-polymyx; Bacitracin; and Penicillins    Medications Prior to Admission:    Prior to Admission medications    Medication Sig Start Date End Date Taking?  Authorizing Provider   umeclidinium-vilanterol (ANORO ELLIPTA) 62.5-25 MCG/INH AEPB inhaler Inhale 1 puff into the lungs every morning 5/23/19  Yes Doug Shankar, DO   torsemide (DEMADEX) 10 MG tablet Take 1 tablet by mouth every other day  Patient taking differently: Take 10 mg by mouth daily  5/7/19 8/5/19 Yes Lucia Younger MD   allopurinol (ZYLOPRIM) 100 MG tablet Take 1 tablet by mouth daily 4/27/19  Yes Sarah Baron MD   pantoprazole (PROTONIX) 40 MG tablet TAKE 1 TABLET BY MOUTH EVERY DAY 3/28/19  Yes Harshil Regalado MD   aspirin 81 MG EC tablet Take 81 mg by mouth daily   Yes Historical Provider, MD   metoprolol succinate (TOPROL XL) 100 MG extended release tablet Take 1 tablet by mouth 2 times daily 10/15/18  Yes Chun Kelly MD   glimepiride (AMARYL) 2 MG tablet Take 2 mg by mouth daily  5/15/18  Yes Historical Provider, MD   Coenzyme Q10 (CO Q-10) 400 MG CAPS Take 400 mg by mouth 2 times daily    Yes Historical Provider, MD   Probiotic Product (PROBIOTIC DAILY PO) Take 1 tablet by mouth daily   Yes Historical Provider, MD   simvastatin (ZOCOR) 40 MG tablet Take 20 mg by mouth nightly    Yes Historical Provider, MD   pioglitazone (ACTOS) 30 MG tablet Take 30 mg by mouth daily   Yes Historical Provider, MD   Multiple Vitamins-Minerals (CENTRUM SILVER) TABS Take 1 tablet by mouth daily    Yes Historical Provider, MD   albuterol sulfate  (90 Base) MCG/ACT inhaler Inhale 2 puffs into the lungs 4 times daily as needed for Wheezing 3/14/19   Jonna Parra II, PA-C   albuterol (PROVENTIL) (2.5 MG/3ML) 0.083% nebulizer solution Take 3 mLs by nebulization every 6 hours as needed for Wheezing or Shortness of Breath 3/14/19   Jonna Parra II, PA-C   acetaminophen-codeine (TYLENOL #3) 300-30 MG per tablet TAKE ONE TABLET BY MOUTH EVERY 6 HOURS AS NEEDED FOR 1 WEEK 6/14/18   Historical Provider, MD       Review of Systems:  Constitutional:negative  for fevers, and negative for chills.   Eyes: negative for visual disturbance   ENT: negative for sore throat, negative nasal congestion, and negative for earache  Respiratory: negative for shortness of breath, negative for cough, and negative for wheezing  Cardiovascular: negative for chest pain, negative for palpitations, and negative for syncope  Gastrointestinal: negative for abdominal pain, negative for nausea,negative for vomiting, negative for diarrhea, negative for constipation, and negative for hematochezia or melena  Genitourinary: negative for dysuria, negative for urinary urgency, negative for urinary frequency, and negative for hematuria  Skin: negative for skin rash, and negative for skin lesions  Neurological: negative for unilateral weakness, numbness or tingling.     Physical Exam:    Vitals:   Temp: 97.3 °F (36.3 °C)  BP: 118/66  Resp: 20  Pulse: 76  SpO2: 96 %  24HR INTAKE/OUTPUT:      Intake/Output Summary (Last 24 hours) at 6/18/2019 1510  Last data filed at 6/18/2019 0941  Gross per 24 hour   Intake 1680 ml   Output 1050 ml   Net 630 ml       Exam:  GEN:  alert and oriented to person, place and time, well-developed and well-nourished, in no acute distress  EYES:  PERRL  NECK: normal, supple  PULM: dimished bilaterally- no wheezes, rales or rhonchi, no respiratory distress  COR:   regular rate & rhythm and no murmurs  ABD:    soft, non-tender, non-distended, normal bowel sounds, no masses or organomegaly  EXT:    edema: trace affecting bilateral foot  NEURO: follows commands, KELLY, no deficits  SKIN:   no rashes or significant lesions    -----------------------------------------------------------------  Diagnostic Data:   Lab Results   Component Value Date    WBC 7.4 06/18/2019    HGB 11.5 (L) 06/18/2019    PLT See Reflexed IPF Result 06/18/2019       Lab Results   Component Value Date    BUN 26 (H) 06/18/2019    CREATININE 1.50 (H) 06/18/2019     06/18/2019    K 3.7 06/18/2019    CALCIUM 8.6 06/18/2019     06/18/2019    CO2 25 06/18/2019    LABGLOM 44 (L) 06/18/2019       Lab Results   Component Value Date    WBCUA 0 TO 2 07/01/2016    RBCUA 20 TO 50 07/01/2016    EPITHUA 0 TO 2 07/01/2016    LEUKOCYTESUR TRACE (A) 07/01/2016    SPECGRAV 1.013 07/01/2016    GLUCOSEU NEGATIVE 07/01/2016    KETUA NEGATIVE 07/01/2016    PROTEINU NEGATIVE 07/01/2016    HGBUR LARGE (A) 07/01/2016 6/17/2019  PROCEDURE: ULTRASOUNDGUIDED LEFT THORACENTESIS 6/17/2019 HISTORY: ORDERING SYSTEM PROVIDED HISTORY: left pleural effusion TECHNOLOGIST PROVIDED HISTORY: left pleural effusion TECHNIQUE: Informed consent was obtained after a detailed explanation of the procedure including risks, benefits, and alternatives. Universal protocol was performed. The right chest was prepped and draped in sterile fashion and local anesthesia was achieved with lidocaine. An 8 Irish needle sheath was advanced under ultrasound guidance into pleural effusion and thoracentesis was performed. The patient tolerated the procedure well. FINDINGS: A total of 1150 was removed. Successful ultrasound guided thoracentesis. Assessment:    Principal Problem:    Pleural effusion on left  Active Problems:    S/P CABG (coronary artery bypass graft)    Chronic kidney disease, stage 3 (HCC)    Type 2 diabetes mellitus (HCC)    Chronic diastolic CHF (congestive heart failure) (HCC)    Essential hypertension    Solitary right kidney    Malignant neoplasm of urinary bladder (HCC)    CAD (coronary artery disease)    Pacemaker    Polymyalgia rheumatica (HCC)    History of bleeding ulcers    Iron deficiency anemia due to chronic blood loss  Resolved Problems:    * No resolved hospital problems.  *      Patient Active Problem List    Diagnosis Date Noted    Iron deficiency anemia due to chronic blood loss 06/18/2019    Pleural effusion on left 06/17/2019    Dyspnea on exertion 05/07/2019    H/O total cystectomy 02/05/2019    History of abdominal aortic aneurysm (AAA) 02/05/2019    GI bleed 05/04/2018    Hemorrhagic disorder due to extrinsic circulating anticoagulants ([address] 05/04/2018    Anemia due to blood loss, acute 05/03/2018    Atrial fibrillation (HCC)     CAD (coronary artery disease)     Polymyalgia rheumatica (HCC)     History of bleeding ulcers     Malignant neoplasm of urinary bladder ([address] 12/14/2016    Pacemaker 11/07/2016    Solitary right kidney 08/22/2016    [facility] 06/13/2016    Chronic diastolic [facility]est[facility] failure) [facility]     Essential hypertension     Chronic kidney disease, stage 3 ([facility] 12/02/2015    Coronary artery disease involving native coronary artery of native heart with angina pectoris ([facility] 11/30/2015    S/P CABG (coronary artery bypass graft) 11/30/2015    Arteriosclerotic retinopathy 11/30/2015       Plan:     · This patient requires overnight observation because of left pleural effusion s/o left thoracentesis  · Factors affecting the medical complexity of this patient include PMR, h/o bladder CA s/p resection with complication leading to solitary kidney, CABG, pacer, DM ,CHF, CKD, CAD, HTN  · Estimated length of stay is 1 day  · Agree with diuresis  · Awaiting CT chest results  · F/u with hematology in relation to Fe def anemia  · May need video capsule study to r/o source of chronic bleeding as prior EGD and colonoscopy negative  · F/u pathology  · Appreciate cardiology  · Discharge once CT chest read  · High risk medication monitoring: none    CORE MEASURES  DVT prophylaxis: SCD  Decubitus ulcer present on admission: No  CODE STATUS: FULL CODE  Nutrition Status: fair   Physical therapy: No   Old Charts reviewed: Yes  EKG Reviewed: Yes  Advance Directive Addressed:  Yes    Yumiko Merrill PA-C  6/18/2019, 3:10 PM

## 2019-06-18 NOTE — DISCHARGE INSTR - DIET

## 2019-06-18 NOTE — PROGRESS NOTES
Nutrition Assessment    Type and Reason for Visit: Initial    Nutrition Recommendations:   1. Continue with current diet. 2. Provided salt substitute seasoning list for spice use. Nutrition Assessment: Altered nutrition related lab values BUN, Cr, and GFR R/T Kidney dysfunction AEB GFR 44, BUN 26, Cr 1.5. Pt with thoracentesis yesterday with 1150 ml removed. Pt with solitary kidney. Iron 19-low. 5/2 PTH 66.16. Renal indices are elevated and GFR 44 with CKD III. Pt with good glycemic control A1c 6.0. Pt reports of good PO before admission. Unable to lose weight, would like to. Eats organic/Non-GMO foods at home. Does not use salt. Aware to limit sodium < 2g per day. Pt requested salt substitute list for seasonings, and was provided one. Malnutrition Assessment:  · Malnutrition Status: No malnutrition  · Context: Acute illness or injury  · Findings of the 6 clinical characteristics of malnutrition (Minimum of 2 out of 6 clinical characteristics is required to make the diagnosis of moderate or severe Protein Calorie Malnutrition based on AND/ASPEN Guidelines):  1. Energy Intake-(> 75% ),      2. Weight Loss-No significant weight loss,    3. Fat Loss-No significant subcutaneous fat loss,    4. Muscle Loss-No significant muscle mass loss,    5. Fluid Accumulation-Mild fluid accumulation, Extremities(RLE +1 pitting, LLE trace)  6.  Strength-Not measured    Nutrition Risk Level:  Moderate    Nutrient Needs:  · Estimated Daily Total Kcal: 3109-8977(24-54/DA)  · Estimated Daily Protein (g): 87-101g(1.3-1.5g/kg)  · Estimated Daily Total Fluid (ml/day): 2218 ml(25/kg)    Nutrition Diagnosis:   · Problem: Altered nutrition-related lab values  · Etiology: related to Renal dysfunction     Signs and symptoms:  as evidenced by Lab values(GFR 44, BUN 26, Cr 1.5)    Objective Information:  · Nutrition-Focused Physical Findings: Pt appears well nourished  · Wound Type: None  · Current Nutrition Therapies:  · Oral

## 2019-06-18 NOTE — CARE COORDINATION
Discussed change from Inpatient to Observation status with Medicare guidelines and regulations. GORE paper presented and explained to patient. Patient hesitant initially to sign the paper. 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 Explained that billing would be for copay and deductible and that total cost is unknown at this time. Explained that his signature is to [facility] you received and understand this notice\", as noted on the form. After further discussion, Pt signed the GORE form willingly. Copy of form given to Pt. Also pointed out the Medicare phone number on the form for Pt to use if he has further questions.   / NICKOLAS Griffith

## 2019-06-19 LAB — SURGICAL PATHOLOGY REPORT: NORMAL

## 2019-06-20 ENCOUNTER — OFFICE VISIT (OUTPATIENT)
Dept: PULMONOLOGY | Age: 84
End: 2019-06-20
Payer: MEDICARE

## 2019-06-20 VITALS
SYSTOLIC BLOOD PRESSURE: 120 MMHG | HEART RATE: 71 BPM | BODY MASS INDEX: 30.1 KG/M2 | TEMPERATURE: 98.1 F | WEIGHT: 191.8 LBS | RESPIRATION RATE: 16 BRPM | OXYGEN SATURATION: 95 % | HEIGHT: 67 IN | DIASTOLIC BLOOD PRESSURE: 72 MMHG

## 2019-06-20 DIAGNOSIS — C67.9 MALIGNANT NEOPLASM OF URINARY BLADDER, UNSPECIFIED SITE (HCC): ICD-10-CM

## 2019-06-20 DIAGNOSIS — E11.22 CKD STAGE 3 DUE TO TYPE 2 DIABETES MELLITUS (HCC): ICD-10-CM

## 2019-06-20 DIAGNOSIS — Z87.891 STOPPED SMOKING WITH GREATER THAN 40 PACK YEAR HISTORY: ICD-10-CM

## 2019-06-20 DIAGNOSIS — N18.30 CKD STAGE 3 DUE TO TYPE 2 DIABETES MELLITUS (HCC): ICD-10-CM

## 2019-06-20 DIAGNOSIS — R53.81 PHYSICAL DECONDITIONING: ICD-10-CM

## 2019-06-20 DIAGNOSIS — J44.9 STAGE 1 MILD COPD BY GOLD CLASSIFICATION (HCC): ICD-10-CM

## 2019-06-20 DIAGNOSIS — M35.3 PMR (POLYMYALGIA RHEUMATICA) (HCC): ICD-10-CM

## 2019-06-20 DIAGNOSIS — I10 ESSENTIAL HYPERTENSION: ICD-10-CM

## 2019-06-20 DIAGNOSIS — R06.09 DYSPNEA ON EXERTION: Primary | ICD-10-CM

## 2019-06-20 DIAGNOSIS — I50.32 CHRONIC DIASTOLIC CHF (CONGESTIVE HEART FAILURE) (HCC): ICD-10-CM

## 2019-06-20 PROCEDURE — 1123F ACP DISCUSS/DSCN MKR DOCD: CPT | Performed by: INTERNAL MEDICINE

## 2019-06-20 PROCEDURE — 3023F SPIROM DOC REV: CPT | Performed by: INTERNAL MEDICINE

## 2019-06-20 PROCEDURE — 1111F DSCHRG MED/CURRENT MED MERGE: CPT | Performed by: INTERNAL MEDICINE

## 2019-06-20 PROCEDURE — G8926 SPIRO NO PERF OR DOC: HCPCS | Performed by: INTERNAL MEDICINE

## 2019-06-20 PROCEDURE — G8417 CALC BMI ABV UP PARAM F/U: HCPCS | Performed by: INTERNAL MEDICINE

## 2019-06-20 PROCEDURE — 4040F PNEUMOC VAC/ADMIN/RCVD: CPT | Performed by: INTERNAL MEDICINE

## 2019-06-20 PROCEDURE — G8427 DOCREV CUR MEDS BY ELIG CLIN: HCPCS | Performed by: INTERNAL MEDICINE

## 2019-06-20 PROCEDURE — 1036F TOBACCO NON-USER: CPT | Performed by: INTERNAL MEDICINE

## 2019-06-20 PROCEDURE — 99213 OFFICE O/P EST LOW 20 MIN: CPT | Performed by: INTERNAL MEDICINE

## 2019-06-20 PROCEDURE — G8598 ASA/ANTIPLAT THER USED: HCPCS | Performed by: INTERNAL MEDICINE

## 2019-06-20 ASSESSMENT — ENCOUNTER SYMPTOMS
SHORTNESS OF BREATH: 1
EYES NEGATIVE: 1

## 2019-06-20 NOTE — PATIENT INSTRUCTIONS
SURVEY:    You may be receiving a survey from [employer] regarding your visit today. Please complete the survey to enable us to provide the highest quality of care to you and your family. If you cannot score us a very good on any question, please call the office to discuss how we could have made your experience a very good one. Thank you. Please recheck your blood pressure when you go home and make sure you take your prescribed medication if applicable . Please follow up with your PCP or ER if needed.

## 2019-06-20 NOTE — PROGRESS NOTES
Subjective:      Patient ID: June Shells is a 80 y.o. male. HPI  Post hospital follow-up visit. Discharged from her CHF in 2 days ago after episode of worsening shortness of breath. Because of chronic kidney disease, CT angiogram of the chest was not performed however he had a noncontrast CT which showed small bilateral pleural effusions. Diagnosed with diastolic dysfunction/congestive heart failure and treated with increased Lasix. Had been on Lasix every other day and switch to half a dose every day. Believes that his symptoms are some better. Monitoring creatinine clearance. He underwent right thoracentesis for left thoracentesis rather. Pleural fluid is a sterile transudate. 93% lymphocytes. Cytology is negative. His shortness of breath was improved on Anoro 1 puff daily. No cough or sputum. No symptoms of an upper respiratory tract infection. Review of Systems   Constitutional: Negative. HENT: Negative. Eyes: Negative. Respiratory: Positive for shortness of breath. Cardiovascular: Positive for leg swelling. All other systems reviewed and are negative. Objective:     Physical Exam   Constitutional: He is oriented to person, place, and time. He appears well-developed and well-nourished. Eyes: Conjunctivae are normal. No scleral icterus. Neck: Neck supple. No JVD present. No tracheal deviation present. No thyromegaly present. Cardiovascular: Normal rate, regular rhythm and normal heart sounds. Exam reveals no gallop. No murmur heard. Pulmonary/Chest: Effort normal. No respiratory distress. He has no wheezes. He has no rales. He exhibits no tenderness. Small amount of dullness both lung bases. Abdominal: Soft. There is no tenderness. Musculoskeletal: He exhibits edema. Lymphadenopathy:     He has no cervical adenopathy. Neurological: He is alert and oriented to person, place, and time. Skin: Skin is warm and dry.    Nursing note and vitals reviewed. Wt Readings from Last 3 Encounters:   06/20/19 191 lb 12.8 oz (87 kg)   06/17/19 195 lb 9 oz (88.7 kg)   05/23/19 192 lb (87.1 kg)          Results for orders placed or performed during the hospital encounter of 06/17/19   Culture, Body Fluid   Result Value Ref Range    Specimen Description . PLEURAL FLUID     Special Requests NOT REPORTED     Direct Exam MANY MONONUCLEAR WHITE BLOOD CELLS SEEN (A)     Direct Exam NO BACTERIA SEEN     Direct Exam       This stained smear was prepared from a cytospun specimen. Culture NO GROWTH 2 DAYS    Acid Fast Culture With Smear   Result Value Ref Range    Specimen Description . PLEURAL FLUID     Special Requests NOT REPORTED     Direct Exam NO ACID FAST BACILLI SEEN (DIRECT SMEAR)     Culture PENDING    CBC Auto Differential   Result Value Ref Range    WBC 8.3 3.5 - 11.3 k/uL    RBC 5.13 4.21 - 5.77 m/uL    Hemoglobin 13.5 13.0 - 17.0 g/dL    Hematocrit 44.3 40.7 - 50.3 %    MCV 86.4 82.6 - 102.9 fL    MCH 26.3 25.2 - 33.5 pg    MCHC 30.5 28.4 - 34.8 g/dL    RDW 19.8 (H) 11.8 - 14.4 %    Platelets 541 951 - 836 k/uL    MPV 9.1 8.1 - 13.5 fL    NRBC Automated 0.0 0.0 per 100 WBC    Differential Type NOT REPORTED     Seg Neutrophils 70 (H) 36 - 65 %    Lymphocytes 20 (L) 24 - 43 %    Monocytes 8 3 - 12 %    Eosinophils % 1 1 - 4 %    Basophils 1 0 - 2 %    Immature Granulocytes 0 0 %    Segs Absolute 5.83 1.50 - 8.10 k/uL    Absolute Lymph # 1.61 1.10 - 3.70 k/uL    Absolute Mono # 0.69 0.10 - 1.20 k/uL    Absolute Eos # 0.06 0.00 - 0.44 k/uL    Basophils # 0.04 0.00 - 0.20 k/uL    Absolute Immature Granulocyte <0.03 0.00 - 0.30 k/uL    WBC Morphology NOT REPORTED     RBC Morphology NOT REPORTED     Platelet Estimate NOT REPORTED    Comprehensive Metabolic Panel w/ Reflex to MG   Result Value Ref Range    Glucose 162 (H) 70 - 99 mg/dL    BUN 22 8 - 23 mg/dL    CREATININE 1.43 (H) 0.70 - 1.20 mg/dL    Bun/Cre Ratio 15 9 - 20    Calcium 9.6 8.6 - 10.4 mg/dL    Sodium Value Ref Range    Sed Rate 15 0 - 20 mm   Iron   Result Value Ref Range    Iron 19 (L) 59 - 158 ug/dL   Immature Platelet Fraction   Result Value Ref Range    Platelet, Immature Fraction 2.1 1.1 - 10.3 %    Platelet, Fluorescence 125 (L) 138 - 453 k/uL   Glucose, Whole Blood   Result Value Ref Range    POC Glucose 85 74 - 100 mg/dL   Surgical Pathology   Result Value Ref Range    Surgical Pathology Report       [id number]  [email or web address]  [facility]  ANATOMIC PATHOLOGY  [address] [place] [address],  [address]. [facility], [facility]  [phone]  Fax: [phone]    [address] [place] [address]     Patient Name: Felipe Wright Med Rec: [id number]  Path Number: [id number]  Collected: 6/17/2019  Received: 6/18/2019  Reported: 6/19/2019 09:26    -- Diagnosis --    PLEURAL FLUID LEFT:          NEGATIVE FOR MALIGNANCY. ANAID Reyes  **Electronically Signed Out**         rdd/6/19/2019       Clinical Information  Left pleural effusion. Source of Specimen  1: PLEURAL FLUID LEFT    Gross Description  \"LT PLEURAL FLUID\" 900.0 ml. light brown cloudy fluid. MICROSCOPIC DESCRIPTION  Pleural fluid shows mostly small and medium size reactive appearing  lymphocytes. Other cells are monocytes, mesothelial cells and a few  neutrophils. There are no cytologically malignant cells. Glucose, Whole Blood   Result Value Ref Range    POC Glucose 128 (H) 74 - 100 mg/dL   EKG 12 Lead   Result Value Ref Range    Ventricular Rate 77 BPM    Atrial Rate 80 BPM    QRS Duration 82 ms    Q-T Interval 390 ms    QTc Calculation (Bazett) 441 ms    R Axis 15 degrees    T Axis -64 degrees       Assessment:         1. Dyspnea on exertion    2. PMR (polymyalgia rheumatica) (HCC)    3. CKD stage 3 due to type 2 diabetes mellitus ([address]    4. Malignant neoplasm of urinary bladder, unspecified site ([address]    5. Chronic diastolic CHF (congestive heart failure) ([address]    6.  Essential hypertension    7. Stage 1 mild COPD by GOLD classification ([facility]    8. Stopped smoking with greater than 40 pack year history    9. Physical deconditioning          Plan:      1. Shortness of breath undoubtedly a combination of congestive heart failure/diastolic dysfunction superimposed on stage III chronic kidney disease. Pleural effusions a manifestation of same. 2. Continue Anoro for mild COPD. 3. Encouraged regular exercise. 4. Return in 3 months. No specific intervention for pleural effusions.      Electronically signed by Danny Tee DO on 6/20/2019at 2:33 PM

## 2019-06-22 ASSESSMENT — ENCOUNTER SYMPTOMS
ABDOMINAL PAIN: 0
COUGH: 0
BACK PAIN: 0
TROUBLE SWALLOWING: 0
VOMITING: 0
CHEST TIGHTNESS: 0
PHOTOPHOBIA: 0
SHORTNESS OF BREATH: 1
VOICE CHANGE: 0
SORE THROAT: 0
FACIAL SWELLING: 0
DIARRHEA: 0
BLOOD IN STOOL: 0
WHEEZING: 0
NAUSEA: 0

## 2019-06-24 ENCOUNTER — HOSPITAL ENCOUNTER (OUTPATIENT)
Age: 84
Discharge: HOME | End: 2019-06-24
Payer: MEDICARE

## 2019-06-24 DIAGNOSIS — Z79.899 MEDICATION DOSE CHANGED: ICD-10-CM

## 2019-06-24 DIAGNOSIS — N18.30 CHRONIC KIDNEY DISEASE, STAGE 3 (HCC): Chronic | ICD-10-CM

## 2019-06-24 DIAGNOSIS — D63.1 ANEMIA DUE TO STAGE 3 CHRONIC KIDNEY DISEASE (HCC): ICD-10-CM

## 2019-06-24 DIAGNOSIS — N18.30 ANEMIA DUE TO STAGE 3 CHRONIC KIDNEY DISEASE (HCC): ICD-10-CM

## 2019-06-24 LAB
ANION GAP SERPL CALCULATED.3IONS-SCNC: 11 MMOL/L (ref 9–17)
BUN BLDV-MCNC: 31 MG/DL (ref 8–23)
BUN/CREAT BLD: 19 (ref 9–20)
CALCIUM SERPL-MCNC: 9.3 MG/DL (ref 8.6–10.4)
CHLORIDE BLD-SCNC: 104 MMOL/L (ref 98–107)
CO2: 28 MMOL/L (ref 20–31)
CREAT SERPL-MCNC: 1.59 MG/DL (ref 0.7–1.2)
CULTURE: ABNORMAL
DIRECT EXAM: ABNORMAL
GFR AFRICAN AMERICAN: 50 ML/MIN
GFR NON-AFRICAN AMERICAN: 41 ML/MIN
GFR SERPL CREATININE-BSD FRML MDRD: ABNORMAL ML/MIN/{1.73_M2}
GFR SERPL CREATININE-BSD FRML MDRD: ABNORMAL ML/MIN/{1.73_M2}
GLUCOSE BLD-MCNC: 164 MG/DL (ref 70–99)
HCT VFR BLD CALC: 45.5 % (ref 40.7–50.3)
HEMOGLOBIN: 13.6 G/DL (ref 13–17)
Lab: ABNORMAL
POTASSIUM SERPL-SCNC: 4.1 MMOL/L (ref 3.7–5.3)
SODIUM BLD-SCNC: 143 MMOL/L (ref 135–144)
SPECIMEN DESCRIPTION: ABNORMAL

## 2019-06-24 PROCEDURE — 80048 BASIC METABOLIC PNL TOTAL CA: CPT

## 2019-06-24 PROCEDURE — 36415 COLL VENOUS BLD VENIPUNCTURE: CPT

## 2019-06-24 PROCEDURE — 85014 HEMATOCRIT: CPT

## 2019-06-24 PROCEDURE — 85018 HEMOGLOBIN: CPT

## 2019-06-25 ENCOUNTER — OFFICE VISIT (OUTPATIENT)
Dept: CARDIOLOGY | Age: 84
End: 2019-06-25
Payer: MEDICARE

## 2019-06-25 VITALS
HEART RATE: 75 BPM | HEIGHT: 67 IN | SYSTOLIC BLOOD PRESSURE: 153 MMHG | BODY MASS INDEX: 30.29 KG/M2 | WEIGHT: 193 LBS | RESPIRATION RATE: 16 BRPM | OXYGEN SATURATION: 100 % | DIASTOLIC BLOOD PRESSURE: 76 MMHG

## 2019-06-25 DIAGNOSIS — I50.32 CHRONIC DIASTOLIC CONGESTIVE HEART FAILURE (HCC): ICD-10-CM

## 2019-06-25 DIAGNOSIS — D64.9 ANEMIA, UNSPECIFIED TYPE: ICD-10-CM

## 2019-06-25 DIAGNOSIS — I48.20 CHRONIC ATRIAL FIBRILLATION (HCC): Primary | ICD-10-CM

## 2019-06-25 DIAGNOSIS — Z95.0 CARDIAC PACEMAKER IN SITU: ICD-10-CM

## 2019-06-25 DIAGNOSIS — E78.2 MIXED HYPERLIPIDEMIA: ICD-10-CM

## 2019-06-25 DIAGNOSIS — I25.810 CORONARY ARTERY DISEASE INVOLVING CORONARY BYPASS GRAFT OF NATIVE HEART WITHOUT ANGINA PECTORIS: ICD-10-CM

## 2019-06-25 PROCEDURE — G8598 ASA/ANTIPLAT THER USED: HCPCS | Performed by: INTERNAL MEDICINE

## 2019-06-25 PROCEDURE — G8417 CALC BMI ABV UP PARAM F/U: HCPCS | Performed by: INTERNAL MEDICINE

## 2019-06-25 PROCEDURE — 1123F ACP DISCUSS/DSCN MKR DOCD: CPT | Performed by: INTERNAL MEDICINE

## 2019-06-25 PROCEDURE — G8427 DOCREV CUR MEDS BY ELIG CLIN: HCPCS | Performed by: INTERNAL MEDICINE

## 2019-06-25 PROCEDURE — 1111F DSCHRG MED/CURRENT MED MERGE: CPT | Performed by: INTERNAL MEDICINE

## 2019-06-25 PROCEDURE — 99214 OFFICE O/P EST MOD 30 MIN: CPT | Performed by: INTERNAL MEDICINE

## 2019-06-25 PROCEDURE — 1036F TOBACCO NON-USER: CPT | Performed by: INTERNAL MEDICINE

## 2019-06-25 PROCEDURE — 4040F PNEUMOC VAC/ADMIN/RCVD: CPT | Performed by: INTERNAL MEDICINE

## 2019-06-25 ASSESSMENT — ENCOUNTER SYMPTOMS
SHORTNESS OF BREATH: 1
ABDOMINAL PAIN: 0
SPUTUM PRODUCTION: 0
HEARTBURN: 0
BACK PAIN: 1
ORTHOPNEA: 0
NAUSEA: 0
PHOTOPHOBIA: 0
EYE PAIN: 0
BLURRED VISION: 0
DIARRHEA: 0
DOUBLE VISION: 0
VOMITING: 0
HEMOPTYSIS: 0
COUGH: 0
EYE DISCHARGE: 0
CONSTIPATION: 0

## 2019-07-05 ENCOUNTER — TELEPHONE (OUTPATIENT)
Dept: CARDIOLOGY | Age: 84
End: 2019-07-05

## 2019-07-05 NOTE — TELEPHONE ENCOUNTER
Heart failure follow up call: Called patient to see how he has been doing including his weight and if any symptoms. Mr. Jose Hyde reports doing ok and denies any symptoms. States that his BP and HR has been staying stable. Weight now at 185 pounds compared to last visit which he was at 193 pounds. Please advise.  Thanks so much

## 2019-07-09 DIAGNOSIS — I50.32 CHRONIC DIASTOLIC CHF (CONGESTIVE HEART FAILURE) (HCC): Primary | Chronic | ICD-10-CM

## 2019-07-09 NOTE — TELEPHONE ENCOUNTER
You would like repeat blood work this week or next visit? If so what all blood work would you like? Please advise.  Thanks so much

## 2019-07-16 ENCOUNTER — HOSPITAL ENCOUNTER (OUTPATIENT)
Age: 84
Discharge: HOME | End: 2019-07-16
Payer: MEDICARE

## 2019-07-16 DIAGNOSIS — I50.32 CHRONIC DIASTOLIC CHF (CONGESTIVE HEART FAILURE) (HCC): Chronic | ICD-10-CM

## 2019-07-16 LAB
ANION GAP SERPL CALCULATED.3IONS-SCNC: 11 MMOL/L (ref 9–17)
BNP INTERPRETATION: ABNORMAL
BUN BLDV-MCNC: 36 MG/DL (ref 8–23)
BUN/CREAT BLD: 22 (ref 9–20)
CALCIUM SERPL-MCNC: 9.8 MG/DL (ref 8.6–10.4)
CHLORIDE BLD-SCNC: 102 MMOL/L (ref 98–107)
CO2: 29 MMOL/L (ref 20–31)
CREAT SERPL-MCNC: 1.61 MG/DL (ref 0.7–1.2)
GFR AFRICAN AMERICAN: 49 ML/MIN
GFR NON-AFRICAN AMERICAN: 41 ML/MIN
GFR SERPL CREATININE-BSD FRML MDRD: ABNORMAL ML/MIN/{1.73_M2}
GFR SERPL CREATININE-BSD FRML MDRD: ABNORMAL ML/MIN/{1.73_M2}
GLUCOSE BLD-MCNC: 208 MG/DL (ref 70–99)
POTASSIUM SERPL-SCNC: 4.4 MMOL/L (ref 3.7–5.3)
PRO-BNP: 1651 PG/ML
SODIUM BLD-SCNC: 142 MMOL/L (ref 135–144)

## 2019-07-16 PROCEDURE — 83880 ASSAY OF NATRIURETIC PEPTIDE: CPT

## 2019-07-16 PROCEDURE — 36415 COLL VENOUS BLD VENIPUNCTURE: CPT

## 2019-07-16 PROCEDURE — 80048 BASIC METABOLIC PNL TOTAL CA: CPT

## 2019-07-22 LAB
CULTURE: NORMAL
Lab: NORMAL
SPECIMEN DESCRIPTION: NORMAL

## 2019-07-26 ENCOUNTER — OFFICE VISIT (OUTPATIENT)
Dept: CARDIOLOGY | Age: 84
End: 2019-07-26
Payer: MEDICARE

## 2019-07-26 VITALS
DIASTOLIC BLOOD PRESSURE: 74 MMHG | WEIGHT: 193.8 LBS | HEIGHT: 67 IN | SYSTOLIC BLOOD PRESSURE: 132 MMHG | BODY MASS INDEX: 30.42 KG/M2 | OXYGEN SATURATION: 98 % | HEART RATE: 73 BPM | RESPIRATION RATE: 16 BRPM

## 2019-07-26 DIAGNOSIS — Z95.0 CARDIAC PACEMAKER IN SITU: ICD-10-CM

## 2019-07-26 DIAGNOSIS — I48.20 CHRONIC ATRIAL FIBRILLATION (HCC): Primary | ICD-10-CM

## 2019-07-26 DIAGNOSIS — I50.32 CHRONIC DIASTOLIC CHF (CONGESTIVE HEART FAILURE) (HCC): ICD-10-CM

## 2019-07-26 DIAGNOSIS — N18.30 CKD (CHRONIC KIDNEY DISEASE), STAGE III (HCC): ICD-10-CM

## 2019-07-26 DIAGNOSIS — E78.2 MIXED HYPERLIPIDEMIA: ICD-10-CM

## 2019-07-26 DIAGNOSIS — I25.10 ASHD (ARTERIOSCLEROTIC HEART DISEASE): ICD-10-CM

## 2019-07-26 PROCEDURE — G8598 ASA/ANTIPLAT THER USED: HCPCS | Performed by: INTERNAL MEDICINE

## 2019-07-26 PROCEDURE — G8417 CALC BMI ABV UP PARAM F/U: HCPCS | Performed by: INTERNAL MEDICINE

## 2019-07-26 PROCEDURE — 99214 OFFICE O/P EST MOD 30 MIN: CPT | Performed by: INTERNAL MEDICINE

## 2019-07-26 PROCEDURE — 1036F TOBACCO NON-USER: CPT | Performed by: INTERNAL MEDICINE

## 2019-07-26 PROCEDURE — 4040F PNEUMOC VAC/ADMIN/RCVD: CPT | Performed by: INTERNAL MEDICINE

## 2019-07-26 PROCEDURE — 1123F ACP DISCUSS/DSCN MKR DOCD: CPT | Performed by: INTERNAL MEDICINE

## 2019-07-26 PROCEDURE — G8427 DOCREV CUR MEDS BY ELIG CLIN: HCPCS | Performed by: INTERNAL MEDICINE

## 2019-07-26 ASSESSMENT — ENCOUNTER SYMPTOMS
NAUSEA: 0
EYE PAIN: 0
CONSTIPATION: 0
PHOTOPHOBIA: 0
HEMOPTYSIS: 0
ABDOMINAL PAIN: 0
BLURRED VISION: 0
EYE DISCHARGE: 0
COUGH: 0
DIARRHEA: 0
SPUTUM PRODUCTION: 0
DOUBLE VISION: 0
VOMITING: 0
SHORTNESS OF BREATH: 1
ORTHOPNEA: 0
HEARTBURN: 0
BACK PAIN: 1

## 2019-07-26 NOTE — PROGRESS NOTES
reminded him to watch for signs of blood in his stool or black tarry stools and stop the medication immediately if this develops as this could be life threatening. Diuretics: Continue Demadex  as directed by nephrology. Beta Blocker: Continue metoprolol succinate (Toprol XL)     Statin Therapy: Continue simvastatin (Zocor) 20 mg nightly. Chronic Diastolic Heart Failure: EF 55% on 4/2018  Beta Blocker: Continue Metorpolol       Diuretics: Continue Demadex      History of chronic kidney disease, single kidney, follow-up with nephrology as previously scheduled. Currently at his dry weight. · Hyperlipidemia: Mixed  · Statin Therapy: Continue simvastatin (Zocor) 20 mg nightly. · Anemia:   · Last Hemoglobin 11.5 on 6/18/2019.  Dual Chamber Pacemaker:   Indication for Device Placement: Tachycardia-Bradycardia Syndrome   Interrogation Findings: I reviewed the results of their most recent home interrogation from Today. Normal pacemaker function. Chronic atrial fibrillation with controlled ventricular rate.  Continue follow-up as previously scheduled. Finally, I recommended that he continue his other medications and follow up with you as previously scheduled. FOLLOW UP:  I told Mr. Rangel to call my office if he had any problems, but otherwise told him to No follow-ups on file. However, I would be happy to see him sooner should the need arise. Sincerely,  Too Sargent MD, F.A.C.C. [facility] Cardiology Specialist    [address] [address] [facility]  Phone: [phone], Fax: [phone]     I believe that the risk of significant morbidity and mortality related to the patient's current medical conditions are: Intermediate. 25 minutes were spent with the patient and all of his questions were answered.

## 2019-08-05 ENCOUNTER — HOSPITAL ENCOUNTER (OUTPATIENT)
Age: 84
Discharge: HOME | End: 2019-08-05
Payer: MEDICARE

## 2019-08-05 DIAGNOSIS — N18.30 CHRONIC KIDNEY DISEASE, STAGE 3 (HCC): Chronic | ICD-10-CM

## 2019-08-05 LAB
ALBUMIN SERPL-MCNC: 4.3 G/DL (ref 3.5–5.2)
ANION GAP SERPL CALCULATED.3IONS-SCNC: 10 MMOL/L (ref 9–17)
BUN BLDV-MCNC: 26 MG/DL (ref 8–23)
BUN/CREAT BLD: 19 (ref 9–20)
CALCIUM SERPL-MCNC: 9.7 MG/DL (ref 8.6–10.4)
CHLORIDE BLD-SCNC: 104 MMOL/L (ref 98–107)
CO2: 29 MMOL/L (ref 20–31)
CREAT SERPL-MCNC: 1.39 MG/DL (ref 0.7–1.2)
CREATININE URINE: 15.5 MG/DL (ref 39–259)
CULTURE: NORMAL
DIRECT EXAM: NORMAL
GFR AFRICAN AMERICAN: 59 ML/MIN
GFR NON-AFRICAN AMERICAN: 48 ML/MIN
GFR SERPL CREATININE-BSD FRML MDRD: ABNORMAL ML/MIN/{1.73_M2}
GFR SERPL CREATININE-BSD FRML MDRD: ABNORMAL ML/MIN/{1.73_M2}
GLUCOSE BLD-MCNC: 194 MG/DL (ref 70–99)
HCT VFR BLD CALC: 45.3 % (ref 40.7–50.3)
HEMOGLOBIN: 14.1 G/DL (ref 13–17)
Lab: NORMAL
MCH RBC QN AUTO: 27.1 PG (ref 25.2–33.5)
MCHC RBC AUTO-ENTMCNC: 31.1 G/DL (ref 28.4–34.8)
MCV RBC AUTO: 87.1 FL (ref 82.6–102.9)
NRBC AUTOMATED: 0 PER 100 WBC
PDW BLD-RTO: 15.9 % (ref 11.8–14.4)
PHOSPHORUS: 3 MG/DL (ref 2.5–4.5)
PLATELET # BLD: 160 K/UL (ref 138–453)
PMV BLD AUTO: 10 FL (ref 8.1–13.5)
POTASSIUM SERPL-SCNC: 4 MMOL/L (ref 3.7–5.3)
PTH INTACT: 64.65 PG/ML (ref 15–65)
RBC # BLD: 5.2 M/UL (ref 4.21–5.77)
SODIUM BLD-SCNC: 143 MMOL/L (ref 135–144)
SPECIMEN DESCRIPTION: NORMAL
TOTAL PROTEIN, URINE: 19 MG/DL
WBC # BLD: 6 K/UL (ref 3.5–11.3)

## 2019-08-05 PROCEDURE — 85027 COMPLETE CBC AUTOMATED: CPT

## 2019-08-05 PROCEDURE — 82040 ASSAY OF SERUM ALBUMIN: CPT

## 2019-08-05 PROCEDURE — 82570 ASSAY OF URINE CREATININE: CPT

## 2019-08-05 PROCEDURE — 84156 ASSAY OF PROTEIN URINE: CPT

## 2019-08-05 PROCEDURE — 80048 BASIC METABOLIC PNL TOTAL CA: CPT

## 2019-08-05 PROCEDURE — 36415 COLL VENOUS BLD VENIPUNCTURE: CPT

## 2019-08-05 PROCEDURE — 84100 ASSAY OF PHOSPHORUS: CPT

## 2019-08-05 PROCEDURE — 83970 ASSAY OF PARATHORMONE: CPT

## 2019-09-12 ENCOUNTER — OFFICE VISIT (OUTPATIENT)
Dept: PULMONOLOGY | Age: 84
End: 2019-09-12
Payer: MEDICARE

## 2019-09-12 VITALS
TEMPERATURE: 98.2 F | HEART RATE: 73 BPM | HEIGHT: 67 IN | OXYGEN SATURATION: 96 % | BODY MASS INDEX: 29.03 KG/M2 | RESPIRATION RATE: 16 BRPM | SYSTOLIC BLOOD PRESSURE: 135 MMHG | DIASTOLIC BLOOD PRESSURE: 69 MMHG | WEIGHT: 185 LBS

## 2019-09-12 DIAGNOSIS — E11.22 CKD STAGE 3 DUE TO TYPE 2 DIABETES MELLITUS (HCC): ICD-10-CM

## 2019-09-12 DIAGNOSIS — Z87.891 STOPPED SMOKING WITH GREATER THAN 40 PACK YEAR HISTORY: ICD-10-CM

## 2019-09-12 DIAGNOSIS — I50.32 CHRONIC DIASTOLIC CHF (CONGESTIVE HEART FAILURE) (HCC): ICD-10-CM

## 2019-09-12 DIAGNOSIS — M35.3 PMR (POLYMYALGIA RHEUMATICA) (HCC): ICD-10-CM

## 2019-09-12 DIAGNOSIS — N18.30 CKD STAGE 3 DUE TO TYPE 2 DIABETES MELLITUS (HCC): ICD-10-CM

## 2019-09-12 DIAGNOSIS — J44.9 STAGE 1 MILD COPD BY GOLD CLASSIFICATION (HCC): Primary | ICD-10-CM

## 2019-09-12 DIAGNOSIS — J90 PLEURAL EFFUSION ON LEFT: ICD-10-CM

## 2019-09-12 DIAGNOSIS — I10 ESSENTIAL HYPERTENSION: ICD-10-CM

## 2019-09-12 PROCEDURE — G8427 DOCREV CUR MEDS BY ELIG CLIN: HCPCS | Performed by: INTERNAL MEDICINE

## 2019-09-12 PROCEDURE — 1036F TOBACCO NON-USER: CPT | Performed by: INTERNAL MEDICINE

## 2019-09-12 PROCEDURE — G8926 SPIRO NO PERF OR DOC: HCPCS | Performed by: INTERNAL MEDICINE

## 2019-09-12 PROCEDURE — 3023F SPIROM DOC REV: CPT | Performed by: INTERNAL MEDICINE

## 2019-09-12 PROCEDURE — G8598 ASA/ANTIPLAT THER USED: HCPCS | Performed by: INTERNAL MEDICINE

## 2019-09-12 PROCEDURE — 4040F PNEUMOC VAC/ADMIN/RCVD: CPT | Performed by: INTERNAL MEDICINE

## 2019-09-12 PROCEDURE — G8417 CALC BMI ABV UP PARAM F/U: HCPCS | Performed by: INTERNAL MEDICINE

## 2019-09-12 PROCEDURE — 1123F ACP DISCUSS/DSCN MKR DOCD: CPT | Performed by: INTERNAL MEDICINE

## 2019-09-12 PROCEDURE — 99213 OFFICE O/P EST LOW 20 MIN: CPT | Performed by: INTERNAL MEDICINE

## 2019-09-12 ASSESSMENT — ENCOUNTER SYMPTOMS
EYES NEGATIVE: 1
RESPIRATORY NEGATIVE: 1

## 2019-09-12 NOTE — PROGRESS NOTES
Subjective:      Patient ID: Andres Skelton is a 80 y.o. male. HPI  Follow-up visit for bilateral pleural effusions and stage I COPD. Was hospitalized in June with increased shortness of breath. Chest x-ray reviewed 6/17/2019. Large left pleural effusion. Therapeutic thoracentesis, 1.5 L removed, follow-up chest x-ray only small amount of left costophrenic angle blunting. Subsequent CT scan of the chest demonstrated only very tiny bilateral pleural effusions. Since then, his shortness of breath is been much improved. He uses Anoro 1 puff daily. Rarely uses albuterol. Review of Systems   Constitutional: Negative. HENT: Negative. Eyes: Negative. Respiratory: Negative. Cardiovascular: Negative. All other systems reviewed and are negative. Objective:     Physical Exam   Constitutional: He is oriented to person, place, and time. He appears well-developed and well-nourished. Eyes: Conjunctivae are normal. No scleral icterus. Neck: Neck supple. No JVD present. No tracheal deviation present. No thyromegaly present. Cardiovascular: Normal rate, regular rhythm and normal heart sounds. Exam reveals no gallop. No murmur heard. Pulmonary/Chest: Effort normal. No respiratory distress. He has no wheezes. He has no rales. He exhibits no tenderness. No dullness to percussion either lung base. Abdominal: Soft. There is no tenderness. Musculoskeletal: He exhibits edema. Trace edema. Lymphadenopathy:     He has no cervical adenopathy. Neurological: He is alert and oriented to person, place, and time. Skin: Skin is warm and dry. Nursing note and vitals reviewed.       Wt Readings from Last 3 Encounters:   09/12/19 185 lb (83.9 kg)   07/26/19 193 lb 12.8 oz (87.9 kg)   06/25/19 193 lb (87.5 kg)          Results for orders placed or performed during the hospital encounter of 08/05/19   PTH, Intact   Result Value Ref Range    Pth Intact 64.65 15.0 - 65.0 pg/mL   Phosphorus Result Value Ref Range    Phosphorus 3.0 2.5 - 4.5 mg/dL   Basic Metabolic Panel   Result Value Ref Range    Glucose 194 (H) 70 - 99 mg/dL    BUN 26 (H) 8 - 23 mg/dL    CREATININE 1.39 (H) 0.70 - 1.20 mg/dL    Bun/Cre Ratio 19 9 - 20    Calcium 9.7 8.6 - 10.4 mg/dL    Sodium 143 135 - 144 mmol/L    Potassium 4.0 3.7 - 5.3 mmol/L    Chloride 104 98 - 107 mmol/L    CO2 29 20 - 31 mmol/L    Anion Gap 10 9 - 17 mmol/L    GFR Non-African American 48 (L) >60 mL/min    GFR  59 (L) >60 mL/min    GFR Comment          GFR Staging         Albumin   Result Value Ref Range    Alb 4.3 3.5 - 5.2 g/dL   CBC   Result Value Ref Range    WBC 6.0 3.5 - 11.3 k/uL    RBC 5.20 4. 21 - 5.77 m/uL    Hemoglobin 14.1 13.0 - 17.0 g/dL    Hematocrit 45.3 40.7 - 50.3 %    MCV 87.1 82.6 - 102.9 fL    MCH 27.1 25.2 - 33.5 pg    MCHC 31.1 28.4 - 34.8 g/dL    RDW 15.9 (H) 11.8 - 14.4 %    Platelets 004 645 - 134 k/uL    MPV 10.0 8.1 - 13.5 fL    NRBC Automated 0.0 0.0 per 100 WBC   Protein, urine, random   Result Value Ref Range    Total Protein, Urine 19 mg/dL   Creatinine, Random Urine   Result Value Ref Range    Creatinine, Ur 15.5 (L) 39.0 - 259.0 mg/dL       Assessment:         1. Stage 1 mild COPD by GOLD classification ([facility]    2. Stopped smoking with greater than 40 pack year history    3. Pleural effusion on left    4. CKD stage 3 due to type 2 diabetes mellitus ([facility]    5. PMR (polymyalgia rheumatica) (HCC)    6. Chronic diastolic CHF (congestive heart failure) ([facility]    7. Essential hypertension          Plan:      1. Continue current management. Patient on Demadex for fluid control. Suffers from stage III chronic kidney disease as well as diastolic heart failure. 2. Flu shot in fall. 3. According to our records, patient needs Prevnar 13 although not be recorded in care path. Follow-up with primary care provider. 4. Return in 6 months.      Electronically signed by Blake Perez DO on 9/12/2019at 1:54 PM

## 2019-11-01 ENCOUNTER — OFFICE VISIT (OUTPATIENT)
Dept: CARDIOLOGY | Age: 84
End: 2019-11-01
Payer: MEDICARE

## 2019-11-01 ENCOUNTER — HOSPITAL ENCOUNTER (OUTPATIENT)
Age: 84
Discharge: HOME | End: 2019-11-01
Payer: MEDICARE

## 2019-11-01 VITALS
DIASTOLIC BLOOD PRESSURE: 94 MMHG | HEIGHT: 67 IN | HEART RATE: 71 BPM | SYSTOLIC BLOOD PRESSURE: 163 MMHG | WEIGHT: 182 LBS | OXYGEN SATURATION: 99 % | RESPIRATION RATE: 18 BRPM | BODY MASS INDEX: 28.56 KG/M2

## 2019-11-01 DIAGNOSIS — N18.30 CHRONIC KIDNEY DISEASE, STAGE 3 (HCC): ICD-10-CM

## 2019-11-01 DIAGNOSIS — I25.10 ASHD (ARTERIOSCLEROTIC HEART DISEASE): ICD-10-CM

## 2019-11-01 DIAGNOSIS — D63.1 ANEMIA DUE TO STAGE 3 CHRONIC KIDNEY DISEASE (HCC): ICD-10-CM

## 2019-11-01 DIAGNOSIS — Z95.0 CARDIAC PACEMAKER IN SITU: ICD-10-CM

## 2019-11-01 DIAGNOSIS — E78.2 MIXED HYPERLIPIDEMIA: ICD-10-CM

## 2019-11-01 DIAGNOSIS — E55.9 AVITAMINOSIS D: ICD-10-CM

## 2019-11-01 DIAGNOSIS — I48.20 CHRONIC ATRIAL FIBRILLATION (HCC): Primary | ICD-10-CM

## 2019-11-01 DIAGNOSIS — N18.30 ANEMIA DUE TO STAGE 3 CHRONIC KIDNEY DISEASE (HCC): ICD-10-CM

## 2019-11-01 DIAGNOSIS — I50.32 CHRONIC DIASTOLIC CHF (CONGESTIVE HEART FAILURE) (HCC): ICD-10-CM

## 2019-11-01 LAB
ABSOLUTE EOS #: 0.16 K/UL (ref 0–0.44)
ABSOLUTE IMMATURE GRANULOCYTE: <0.03 K/UL (ref 0–0.3)
ABSOLUTE LYMPH #: 2.09 K/UL (ref 1.1–3.7)
ABSOLUTE MONO #: 0.5 K/UL (ref 0.1–1.2)
ALBUMIN SERPL-MCNC: 4.1 G/DL (ref 3.5–5.2)
ANION GAP SERPL CALCULATED.3IONS-SCNC: 15 MMOL/L (ref 9–17)
BASOPHILS # BLD: 1 % (ref 0–2)
BASOPHILS ABSOLUTE: 0.03 K/UL (ref 0–0.2)
BUN BLDV-MCNC: 35 MG/DL (ref 8–23)
BUN/CREAT BLD: 22 (ref 9–20)
CALCIUM SERPL-MCNC: 9.7 MG/DL (ref 8.6–10.4)
CHLORIDE BLD-SCNC: 100 MMOL/L (ref 98–107)
CO2: 23 MMOL/L (ref 20–31)
CREAT SERPL-MCNC: 1.56 MG/DL (ref 0.7–1.2)
CREATININE URINE: 22 MG/DL (ref 39–259)
CREATININE URINE: 22.5 MG/DL (ref 39–259)
DIFFERENTIAL TYPE: NORMAL
EOSINOPHILS RELATIVE PERCENT: 3 % (ref 1–4)
GFR AFRICAN AMERICAN: 51 ML/MIN
GFR NON-AFRICAN AMERICAN: 42 ML/MIN
GFR SERPL CREATININE-BSD FRML MDRD: ABNORMAL ML/MIN/{1.73_M2}
GFR SERPL CREATININE-BSD FRML MDRD: ABNORMAL ML/MIN/{1.73_M2}
GLUCOSE BLD-MCNC: 126 MG/DL (ref 70–99)
HCT VFR BLD CALC: 49.8 % (ref 40.7–50.3)
HEMOGLOBIN: 15.4 G/DL (ref 13–17)
IMMATURE GRANULOCYTES: 0 %
LYMPHOCYTES # BLD: 36 % (ref 24–43)
MCH RBC QN AUTO: 28.3 PG (ref 25.2–33.5)
MCHC RBC AUTO-ENTMCNC: 30.9 G/DL (ref 28.4–34.8)
MCV RBC AUTO: 91.5 FL (ref 82.6–102.9)
MICROALBUMIN/CREAT 24H UR: 17 MG/L
MICROALBUMIN/CREAT UR-RTO: 77 MCG/MG CREAT
MONOCYTES # BLD: 9 % (ref 3–12)
NRBC AUTOMATED: 0 PER 100 WBC
PDW BLD-RTO: 13.5 % (ref 11.8–14.4)
PHOSPHORUS: 3.5 MG/DL (ref 2.5–4.5)
PLATELET # BLD: 149 K/UL (ref 138–453)
PLATELET ESTIMATE: NORMAL
PMV BLD AUTO: 10.2 FL (ref 8.1–13.5)
POTASSIUM SERPL-SCNC: 3.8 MMOL/L (ref 3.7–5.3)
PTH INTACT: 74.57 PG/ML (ref 15–65)
RBC # BLD: 5.44 M/UL (ref 4.21–5.77)
RBC # BLD: NORMAL 10*6/UL
SEG NEUTROPHILS: 51 % (ref 36–65)
SEGMENTED NEUTROPHILS ABSOLUTE COUNT: 3.05 K/UL (ref 1.5–8.1)
SODIUM BLD-SCNC: 138 MMOL/L (ref 135–144)
TOTAL PROTEIN, URINE: 13 MG/DL
VITAMIN D 25-HYDROXY: 60 NG/ML (ref 30–100)
WBC # BLD: 5.8 K/UL (ref 3.5–11.3)
WBC # BLD: NORMAL 10*3/UL

## 2019-11-01 PROCEDURE — 4040F PNEUMOC VAC/ADMIN/RCVD: CPT | Performed by: INTERNAL MEDICINE

## 2019-11-01 PROCEDURE — 1123F ACP DISCUSS/DSCN MKR DOCD: CPT | Performed by: INTERNAL MEDICINE

## 2019-11-01 PROCEDURE — G8427 DOCREV CUR MEDS BY ELIG CLIN: HCPCS | Performed by: INTERNAL MEDICINE

## 2019-11-01 PROCEDURE — 84156 ASSAY OF PROTEIN URINE: CPT

## 2019-11-01 PROCEDURE — 80048 BASIC METABOLIC PNL TOTAL CA: CPT

## 2019-11-01 PROCEDURE — 82306 VITAMIN D 25 HYDROXY: CPT

## 2019-11-01 PROCEDURE — 82040 ASSAY OF SERUM ALBUMIN: CPT

## 2019-11-01 PROCEDURE — G8484 FLU IMMUNIZE NO ADMIN: HCPCS | Performed by: INTERNAL MEDICINE

## 2019-11-01 PROCEDURE — 1036F TOBACCO NON-USER: CPT | Performed by: INTERNAL MEDICINE

## 2019-11-01 PROCEDURE — 99214 OFFICE O/P EST MOD 30 MIN: CPT | Performed by: INTERNAL MEDICINE

## 2019-11-01 PROCEDURE — G8417 CALC BMI ABV UP PARAM F/U: HCPCS | Performed by: INTERNAL MEDICINE

## 2019-11-01 PROCEDURE — G8598 ASA/ANTIPLAT THER USED: HCPCS | Performed by: INTERNAL MEDICINE

## 2019-11-01 PROCEDURE — 84100 ASSAY OF PHOSPHORUS: CPT

## 2019-11-01 PROCEDURE — 83970 ASSAY OF PARATHORMONE: CPT

## 2019-11-01 PROCEDURE — 82043 UR ALBUMIN QUANTITATIVE: CPT

## 2019-11-01 PROCEDURE — 85025 COMPLETE CBC W/AUTO DIFF WBC: CPT

## 2019-11-01 PROCEDURE — 82570 ASSAY OF URINE CREATININE: CPT

## 2019-11-01 PROCEDURE — 36415 COLL VENOUS BLD VENIPUNCTURE: CPT

## 2019-11-01 ASSESSMENT — ENCOUNTER SYMPTOMS
HEMOPTYSIS: 0
ABDOMINAL PAIN: 0
VOMITING: 0
BLURRED VISION: 0
DOUBLE VISION: 0
EYE PAIN: 0
ORTHOPNEA: 0
EYE DISCHARGE: 0
HEARTBURN: 0
NAUSEA: 0
BACK PAIN: 1
SPUTUM PRODUCTION: 0
SHORTNESS OF BREATH: 1
CONSTIPATION: 0
COUGH: 0
DIARRHEA: 0
PHOTOPHOBIA: 0

## 2019-11-29 RX ORDER — METOPROLOL SUCCINATE 100 MG/1
TABLET, EXTENDED RELEASE ORAL
Qty: 180 TABLET | Refills: 3 | Status: SHIPPED | OUTPATIENT
Start: 2019-11-29 | End: 2020-03-10 | Stop reason: SDUPTHER

## 2019-12-02 ENCOUNTER — OFFICE VISIT (OUTPATIENT)
Dept: CARDIOLOGY | Age: 84
End: 2019-12-02
Payer: MEDICARE

## 2019-12-02 VITALS
WEIGHT: 185 LBS | HEART RATE: 72 BPM | SYSTOLIC BLOOD PRESSURE: 125 MMHG | OXYGEN SATURATION: 97 % | DIASTOLIC BLOOD PRESSURE: 76 MMHG | BODY MASS INDEX: 29.03 KG/M2 | HEIGHT: 67 IN | RESPIRATION RATE: 18 BRPM

## 2019-12-02 DIAGNOSIS — E78.2 MIXED HYPERLIPIDEMIA: ICD-10-CM

## 2019-12-02 DIAGNOSIS — I49.5 TACHYCARDIA-BRADYCARDIA SYNDROME (HCC): ICD-10-CM

## 2019-12-02 DIAGNOSIS — I48.21 PERMANENT ATRIAL FIBRILLATION (HCC): Primary | Chronic | ICD-10-CM

## 2019-12-02 DIAGNOSIS — I50.32 CHRONIC DIASTOLIC CHF (CONGESTIVE HEART FAILURE) (HCC): Chronic | ICD-10-CM

## 2019-12-02 DIAGNOSIS — Z95.0 PACEMAKER: Chronic | ICD-10-CM

## 2019-12-02 DIAGNOSIS — I25.119 CORONARY ARTERY DISEASE INVOLVING NATIVE CORONARY ARTERY OF NATIVE HEART WITH ANGINA PECTORIS (HCC): Chronic | ICD-10-CM

## 2019-12-02 PROCEDURE — 1036F TOBACCO NON-USER: CPT | Performed by: INTERNAL MEDICINE

## 2019-12-02 PROCEDURE — G8598 ASA/ANTIPLAT THER USED: HCPCS | Performed by: INTERNAL MEDICINE

## 2019-12-02 PROCEDURE — G8484 FLU IMMUNIZE NO ADMIN: HCPCS | Performed by: INTERNAL MEDICINE

## 2019-12-02 PROCEDURE — G8417 CALC BMI ABV UP PARAM F/U: HCPCS | Performed by: INTERNAL MEDICINE

## 2019-12-02 PROCEDURE — 99214 OFFICE O/P EST MOD 30 MIN: CPT | Performed by: INTERNAL MEDICINE

## 2019-12-02 PROCEDURE — 4040F PNEUMOC VAC/ADMIN/RCVD: CPT | Performed by: INTERNAL MEDICINE

## 2019-12-02 PROCEDURE — 1123F ACP DISCUSS/DSCN MKR DOCD: CPT | Performed by: INTERNAL MEDICINE

## 2019-12-02 PROCEDURE — G8427 DOCREV CUR MEDS BY ELIG CLIN: HCPCS | Performed by: INTERNAL MEDICINE

## 2019-12-02 ASSESSMENT — ENCOUNTER SYMPTOMS
NAUSEA: 0
CONSTIPATION: 0
PHOTOPHOBIA: 0
DOUBLE VISION: 0
HEARTBURN: 0
SPUTUM PRODUCTION: 0
ORTHOPNEA: 0
COUGH: 0
VOMITING: 0
EYE PAIN: 0
EYE DISCHARGE: 0
BACK PAIN: 1
HEMOPTYSIS: 0
ABDOMINAL PAIN: 0
SHORTNESS OF BREATH: 1
DIARRHEA: 0
BLURRED VISION: 0

## 2019-12-16 ENCOUNTER — HOSPITAL ENCOUNTER (OUTPATIENT)
Age: 84
Discharge: HOME | End: 2019-12-16
Payer: MEDICARE

## 2019-12-16 DIAGNOSIS — E78.2 MIXED HYPERLIPIDEMIA: ICD-10-CM

## 2019-12-16 DIAGNOSIS — I25.119 CORONARY ARTERY DISEASE INVOLVING NATIVE CORONARY ARTERY OF NATIVE HEART WITH ANGINA PECTORIS (HCC): Chronic | ICD-10-CM

## 2019-12-16 DIAGNOSIS — I50.32 CHRONIC DIASTOLIC CHF (CONGESTIVE HEART FAILURE) (HCC): Chronic | ICD-10-CM

## 2019-12-16 DIAGNOSIS — I49.5 TACHYCARDIA-BRADYCARDIA SYNDROME (HCC): ICD-10-CM

## 2019-12-16 DIAGNOSIS — Z95.0 PACEMAKER: Chronic | ICD-10-CM

## 2019-12-16 DIAGNOSIS — I48.21 PERMANENT ATRIAL FIBRILLATION (HCC): Chronic | ICD-10-CM

## 2019-12-16 LAB
HCT VFR BLD CALC: 49.1 % (ref 40.7–50.3)
HEMOGLOBIN: 15.2 G/DL (ref 13–17)
MCH RBC QN AUTO: 28.4 PG (ref 25.2–33.5)
MCHC RBC AUTO-ENTMCNC: 31 G/DL (ref 28.4–34.8)
MCV RBC AUTO: 91.6 FL (ref 82.6–102.9)
NRBC AUTOMATED: 0 PER 100 WBC
PDW BLD-RTO: 13.4 % (ref 11.8–14.4)
PLATELET # BLD: 178 K/UL (ref 138–453)
PMV BLD AUTO: 9.7 FL (ref 8.1–13.5)
RBC # BLD: 5.36 M/UL (ref 4.21–5.77)
WBC # BLD: 6.5 K/UL (ref 3.5–11.3)

## 2019-12-16 PROCEDURE — 36415 COLL VENOUS BLD VENIPUNCTURE: CPT

## 2019-12-16 PROCEDURE — 85027 COMPLETE CBC AUTOMATED: CPT

## 2019-12-17 ENCOUNTER — TELEPHONE (OUTPATIENT)
Dept: CARDIOLOGY | Age: 84
End: 2019-12-17

## 2019-12-30 ENCOUNTER — HOSPITAL ENCOUNTER (OUTPATIENT)
Age: 84
Discharge: HOME | End: 2019-12-30
Payer: MEDICARE

## 2019-12-30 LAB
ABSOLUTE EOS #: 0.14 K/UL (ref 0–0.44)
ABSOLUTE IMMATURE GRANULOCYTE: <0.03 K/UL (ref 0–0.3)
ABSOLUTE LYMPH #: 2 K/UL (ref 1.1–3.7)
ABSOLUTE MONO #: 0.45 K/UL (ref 0.1–1.2)
ABSOLUTE RETIC #: 0.07 M/UL (ref 0.03–0.08)
BASOPHILS # BLD: 1 % (ref 0–2)
BASOPHILS ABSOLUTE: 0.05 K/UL (ref 0–0.2)
DIFFERENTIAL TYPE: ABNORMAL
EOSINOPHILS RELATIVE PERCENT: 2 % (ref 1–4)
HCT VFR BLD CALC: 51.2 % (ref 40.7–50.3)
HEMOGLOBIN: 16.2 G/DL (ref 13–17)
IMMATURE GRANULOCYTES: 0 %
IMMATURE RETIC FRACT: 10.7 % (ref 2.7–18.3)
INR BLD: 1.1 (ref 0.9–1.2)
LYMPHOCYTES # BLD: 29 % (ref 24–43)
MCH RBC QN AUTO: 28.8 PG (ref 25.2–33.5)
MCHC RBC AUTO-ENTMCNC: 31.6 G/DL (ref 28.4–34.8)
MCV RBC AUTO: 90.9 FL (ref 82.6–102.9)
MONOCYTES # BLD: 6 % (ref 3–12)
NRBC AUTOMATED: 0 PER 100 WBC
PARTIAL THROMBOPLASTIN TIME: 30.4 SEC (ref 23.2–34.4)
PDW BLD-RTO: 13.5 % (ref 11.8–14.4)
PLATELET # BLD: 143 K/UL (ref 138–453)
PLATELET ESTIMATE: ABNORMAL
PMV BLD AUTO: 9.5 FL (ref 8.1–13.5)
PROTHROMBIN TIME: 11.2 SEC (ref 9.7–12.2)
RBC # BLD: 5.63 M/UL (ref 4.21–5.77)
RBC # BLD: ABNORMAL 10*6/UL
RETIC %: 1.3 % (ref 0.5–1.9)
RETIC HEMOGLOBIN: 34.4 PG (ref 28.2–35.7)
SEG NEUTROPHILS: 62 % (ref 36–65)
SEGMENTED NEUTROPHILS ABSOLUTE COUNT: 4.34 K/UL (ref 1.5–8.1)
WBC # BLD: 7 K/UL (ref 3.5–11.3)
WBC # BLD: ABNORMAL 10*3/UL

## 2019-12-30 PROCEDURE — 85730 THROMBOPLASTIN TIME PARTIAL: CPT

## 2019-12-30 PROCEDURE — 85025 COMPLETE CBC W/AUTO DIFF WBC: CPT

## 2019-12-30 PROCEDURE — 85610 PROTHROMBIN TIME: CPT

## 2019-12-30 PROCEDURE — 82728 ASSAY OF FERRITIN: CPT

## 2019-12-30 PROCEDURE — 85045 AUTOMATED RETICULOCYTE COUNT: CPT

## 2019-12-30 PROCEDURE — 36415 COLL VENOUS BLD VENIPUNCTURE: CPT

## 2019-12-31 LAB — FERRITIN: 174 UG/L (ref 30–400)

## 2020-02-07 NOTE — ED NOTES
Armand Sheets is a 14 year old male presenting for follow up on influenza and pneumonia.   Medications verified, no changes.  Denies known Latex allergy or symptoms of Latex sensitivity.  Health Maintenance Due   Topic Date Due   • Influenza Vaccine (1) 09/01/2019   • Depression Screening  05/13/2020       Patient is due for topics as listed above but is not proceeding with Immunization(s) Influenza at this time.     Dr Sims Sink in to explain procedure and perform procedure     Karuna Nichole, RN  06/17/19 2218

## 2020-03-11 RX ORDER — METOPROLOL SUCCINATE 100 MG/1
TABLET, EXTENDED RELEASE ORAL
Qty: 180 TABLET | Refills: 3 | Status: SHIPPED | OUTPATIENT
Start: 2020-03-11 | End: 2021-03-10

## 2020-04-01 RX ORDER — UMECLIDINIUM BROMIDE AND VILANTEROL TRIFENATATE 62.5; 25 UG/1; UG/1
1 POWDER RESPIRATORY (INHALATION) EVERY MORNING
Qty: 3 EACH | Refills: 3 | Status: SHIPPED | OUTPATIENT
Start: 2020-04-01 | End: 2020-06-15

## 2020-06-05 ENCOUNTER — OFFICE VISIT (OUTPATIENT)
Dept: CARDIOLOGY | Age: 85
End: 2020-06-05
Payer: MEDICARE

## 2020-06-05 ENCOUNTER — HOSPITAL ENCOUNTER (OUTPATIENT)
Age: 85
Discharge: HOME | End: 2020-06-05
Payer: MEDICARE

## 2020-06-05 VITALS
DIASTOLIC BLOOD PRESSURE: 81 MMHG | HEART RATE: 73 BPM | HEIGHT: 67 IN | SYSTOLIC BLOOD PRESSURE: 143 MMHG | BODY MASS INDEX: 28.5 KG/M2 | RESPIRATION RATE: 16 BRPM | OXYGEN SATURATION: 98 % | WEIGHT: 181.6 LBS

## 2020-06-05 LAB
ANION GAP SERPL CALCULATED.3IONS-SCNC: 14 MMOL/L (ref 9–17)
BUN BLDV-MCNC: 32 MG/DL (ref 8–23)
BUN/CREAT BLD: 21 (ref 9–20)
CALCIUM SERPL-MCNC: 9.5 MG/DL (ref 8.6–10.4)
CHLORIDE BLD-SCNC: 98 MMOL/L (ref 98–107)
CO2: 25 MMOL/L (ref 20–31)
CREAT SERPL-MCNC: 1.51 MG/DL (ref 0.7–1.2)
GFR AFRICAN AMERICAN: 53 ML/MIN
GFR NON-AFRICAN AMERICAN: 44 ML/MIN
GFR SERPL CREATININE-BSD FRML MDRD: ABNORMAL ML/MIN/{1.73_M2}
GFR SERPL CREATININE-BSD FRML MDRD: ABNORMAL ML/MIN/{1.73_M2}
GLUCOSE BLD-MCNC: 151 MG/DL (ref 70–99)
HCT VFR BLD CALC: 50.2 % (ref 40.7–50.3)
HEMOGLOBIN: 15.7 G/DL (ref 13–17)
MCH RBC QN AUTO: 29 PG (ref 25.2–33.5)
MCHC RBC AUTO-ENTMCNC: 31.3 G/DL (ref 28.4–34.8)
MCV RBC AUTO: 92.8 FL (ref 82.6–102.9)
NRBC AUTOMATED: 0 PER 100 WBC
PDW BLD-RTO: 12.8 % (ref 11.8–14.4)
PLATELET # BLD: 143 K/UL (ref 138–453)
PMV BLD AUTO: 10.3 FL (ref 8.1–13.5)
POTASSIUM SERPL-SCNC: 4.3 MMOL/L (ref 3.7–5.3)
RBC # BLD: 5.41 M/UL (ref 4.21–5.77)
SODIUM BLD-SCNC: 137 MMOL/L (ref 135–144)
WBC # BLD: 6.6 K/UL (ref 3.5–11.3)

## 2020-06-05 PROCEDURE — 1123F ACP DISCUSS/DSCN MKR DOCD: CPT | Performed by: INTERNAL MEDICINE

## 2020-06-05 PROCEDURE — G8417 CALC BMI ABV UP PARAM F/U: HCPCS | Performed by: INTERNAL MEDICINE

## 2020-06-05 PROCEDURE — 4040F PNEUMOC VAC/ADMIN/RCVD: CPT | Performed by: INTERNAL MEDICINE

## 2020-06-05 PROCEDURE — 99214 OFFICE O/P EST MOD 30 MIN: CPT | Performed by: INTERNAL MEDICINE

## 2020-06-05 PROCEDURE — 36415 COLL VENOUS BLD VENIPUNCTURE: CPT

## 2020-06-05 PROCEDURE — G8427 DOCREV CUR MEDS BY ELIG CLIN: HCPCS | Performed by: INTERNAL MEDICINE

## 2020-06-05 PROCEDURE — 80048 BASIC METABOLIC PNL TOTAL CA: CPT

## 2020-06-05 PROCEDURE — 85027 COMPLETE CBC AUTOMATED: CPT

## 2020-06-05 PROCEDURE — 1036F TOBACCO NON-USER: CPT | Performed by: INTERNAL MEDICINE

## 2020-06-05 ASSESSMENT — ENCOUNTER SYMPTOMS
SPUTUM PRODUCTION: 0
HEMOPTYSIS: 0
NAUSEA: 0
BLURRED VISION: 0
ORTHOPNEA: 0
HEARTBURN: 0
DIARRHEA: 0
ABDOMINAL PAIN: 0
DOUBLE VISION: 0
CONSTIPATION: 0
EYE DISCHARGE: 0
COUGH: 0
SHORTNESS OF BREATH: 1
EYE PAIN: 0
BACK PAIN: 1
PHOTOPHOBIA: 0
VOMITING: 0

## 2020-06-05 NOTE — PROGRESS NOTES
Yaneli Mejias RN am scribing for and in the presence of Maria D Irene MD, F.A.C.C. Subjective:     CHIEF COMPLAINT / HPI:   Chief Complaint   Patient presents with    Follow-up     HFC-Weight at last visit was 185 lbs and todays weight is 181 lbs. denies CP, lightheadedness/dizziness SOB with inclining or alot of lifting or over exertion. Pt states that he usually does not feel his palpitations. Dear Dr. Willi Lewis MD    I had the pleasure of seeing Mr. Rangel for follow-up today. 1-Danny Rangel is 80 y.o. male with extensive cardiac history that include history of myocardial infarction status post bypass surgery in 1995, long-standing history of atrial fibrillation and history of abdominal aortic aneurysm status post repair. 2-Patient has long-standing history of atrial fibrillation that started after his bypass surgery, has been tried on multiple antiarrhythmic medications including sotalol which failed to control his atrial fibrillation, dofetilide 125 mg which has been discontinued at the time of his nephrectomy. He also has been tried on amiodarone but could not tolerate it because of side effect (lung toxicity). 3-Patient was reloaded with Tikosyn, which controlled his atrial fibrillation only partially. Patient was sent to [facility] for ablation but because of his age and his ling history of A Fib, they thought the success rate will not be that high. Decision is made to go for rate control and pacemaker placement. Patient underwent the procedure on 11/07/2016. 4-He was admitted for a low hemoglobin count (5/1/2018- 7.4) anemia secondary to acute blood loss from GI bleeding. His Xarelto is on hold. 5-Echocardiogram: 4/19/2018: EF 55% with moderate mitral regurgitation. 6-Cardiovascular stress test: 4/25/2018: Normal myocardial perfusion.      7-an admission to [facility] on 6/17/2018 because of large left-sided pleural effusion and 7. Chronic anticoagulation       Plan:     Chronic Atrial Fibrillation: Rate Control S/P pacemaker placement on 11/07/2016  Beta Blocker: Continue metoprolol succinate (Toprol XL) 100 mg twice a day. Stroke Risk: His CHADS2-VASc score is greater than 2 (2.2% stroke risk)  · Anticoagulation: Continue Eliquis 2.5 mg every 12 hours. Unable to tolerate Xarelto due to history of significant GI bleeding requiring transfusion. He tried it one more time and he has hematuria with it. Mr. Neville Skaggs  said he was miserable when he was on Xarelto because his blood count was always low and he was always tired. · Laboratory testing:  · Ordered a CBC for him to have done for now to assess his hemoglobin. · Ordered BMP to assess his kidney function and potassium. Atherosclerotic Heart Disease: History of CABG in 1995   Antiplatelet Agent: Patient is not taking ASA at this time. Diuretics: Continue Demadex  as directed by nephrology and also continue Spironolactone 12.5 mg daily. Beta Blocker: Continue metoprolol succinate (Toprol XL) 100 mg daily. Statin Therapy: Continue simvastatin (Zocor) 20 mg nightly. Chronic Diastolic Heart Failure: EF 55% on 4/2018  Beta Blocker: Continue Metorpolol 100 mg twice daily. Diuretics: Continue Demadex   20 mg on Mon, Wed, and Fri and 10 mg on Tue, Thur, Sat and Sunday. And also continue Aldactone 12.5 mg daily. History of chronic kidney disease, single kidney, follow-up with nephrology as previously scheduled. · Hyperlipidemia: Mixed  · Statin Therapy: Continue simvastatin (Zocor) 20 mg nightly.  Dual Chamber Pacemaker:   Indication for Device Placement: Tachycardia-Bradycardia Syndrome   Interrogation Findings: Continue follow-up every 6 months via home monitoring followed by 1 year routine checks in the office as previously scheduled.        Essential Hypertension: Borderline controlled  · Beta Blocker: Continue Metoprolol tartrate (Lopressor) 100 mg bid.   · Diuretics: Continue demadex as outline above  · Additional testing: Although his blood pressure was a bit elevated today, I will not make any changes to his medications at this time and I asked him to check his blood pressure at home periodically and call us with the results in 1-2 weeks. Finally, I recommended that he continue his other medications and follow up with you as previously scheduled. FOLLOW UP:  I told Mr. Rangel to call my office if he had any problems, but otherwise told him to Return in about 6 months (around 12/5/2020). However, I would be happy to see him sooner should the need arise. Sincerely,  Maria D Irene MD, F.A.C.C. [facility] Cardiology Specialist    [address] [facility], [address] [place] [address]  Phone: [phone], Fax: [phone]     I believe that the risk of significant morbidity and mortality related to the patient's current medical conditions are: intermediate-high. The documentation recorded by the scribe, accurately and completely reflects the services I personally performed and the decisions made by me. Maria D Irene MD, F.A.C.C.  June 5, 2020

## 2020-06-05 NOTE — PATIENT INSTRUCTIONS
SURVEY:    You may be receiving a survey from [employer] regarding your visit today. Please complete the survey to enable us to provide the highest quality of care to you and your family. If you cannot score us a very good on any question, please call the office to discuss how we could have made your experience a very good one. Thank you.

## 2020-06-08 ENCOUNTER — TELEPHONE (OUTPATIENT)
Dept: CARDIOLOGY | Age: 85
End: 2020-06-08

## 2020-06-15 RX ORDER — UMECLIDINIUM BROMIDE AND VILANTEROL TRIFENATATE 62.5; 25 UG/1; UG/1
POWDER RESPIRATORY (INHALATION)
Qty: 1 EACH | Refills: 11 | Status: SHIPPED | OUTPATIENT
Start: 2020-06-15 | End: 2020-06-16 | Stop reason: SDUPTHER

## 2020-06-16 RX ORDER — UMECLIDINIUM BROMIDE AND VILANTEROL TRIFENATATE 62.5; 25 UG/1; UG/1
1 POWDER RESPIRATORY (INHALATION) DAILY
Qty: 1 EACH | Refills: 11 | Status: SHIPPED | OUTPATIENT
Start: 2020-06-16 | End: 2020-11-09 | Stop reason: SDUPTHER

## 2020-06-24 ENCOUNTER — TELEPHONE (OUTPATIENT)
Dept: CARDIOLOGY | Age: 85
End: 2020-06-24

## 2020-06-24 ENCOUNTER — NURSE ONLY (OUTPATIENT)
Dept: CARDIOLOGY | Age: 85
End: 2020-06-24
Payer: MEDICARE

## 2020-06-24 PROCEDURE — 93296 REM INTERROG EVL PM/IDS: CPT | Performed by: INTERNAL MEDICINE

## 2020-06-24 PROCEDURE — 93294 REM INTERROG EVL PM/LDLS PM: CPT | Performed by: INTERNAL MEDICINE

## 2020-08-03 ENCOUNTER — HOSPITAL ENCOUNTER (OUTPATIENT)
Age: 85
Discharge: HOME | End: 2020-08-03
Payer: MEDICARE

## 2020-08-03 LAB
ALBUMIN SERPL-MCNC: 4.1 G/DL (ref 3.5–5.2)
ANION GAP SERPL CALCULATED.3IONS-SCNC: 9 MMOL/L (ref 9–17)
BUN BLDV-MCNC: 29 MG/DL (ref 8–23)
BUN/CREAT BLD: 19 (ref 9–20)
CALCIUM SERPL-MCNC: 9.6 MG/DL (ref 8.6–10.4)
CHLORIDE BLD-SCNC: 100 MMOL/L (ref 98–107)
CO2: 29 MMOL/L (ref 20–31)
CREAT SERPL-MCNC: 1.52 MG/DL (ref 0.7–1.2)
CREATININE URINE: 15.6 MG/DL (ref 39–259)
GFR AFRICAN AMERICAN: 53 ML/MIN
GFR NON-AFRICAN AMERICAN: 44 ML/MIN
GFR SERPL CREATININE-BSD FRML MDRD: ABNORMAL ML/MIN/{1.73_M2}
GFR SERPL CREATININE-BSD FRML MDRD: ABNORMAL ML/MIN/{1.73_M2}
GLUCOSE BLD-MCNC: 137 MG/DL (ref 70–99)
HCT VFR BLD CALC: 50.7 % (ref 40.7–50.3)
HEMOGLOBIN: 15.7 G/DL (ref 13–17)
MCH RBC QN AUTO: 29.7 PG (ref 25.2–33.5)
MCHC RBC AUTO-ENTMCNC: 31 G/DL (ref 28.4–34.8)
MCV RBC AUTO: 95.8 FL (ref 82.6–102.9)
MICROALBUMIN/CREAT 24H UR: <12 MG/L
MICROALBUMIN/CREAT UR-RTO: ABNORMAL MCG/MG CREAT
NRBC AUTOMATED: 0 PER 100 WBC
PDW BLD-RTO: 13 % (ref 11.8–14.4)
PHOSPHORUS: 3.2 MG/DL (ref 2.5–4.5)
PLATELET # BLD: 127 K/UL (ref 138–453)
PMV BLD AUTO: 10.2 FL (ref 8.1–13.5)
POTASSIUM SERPL-SCNC: 4.4 MMOL/L (ref 3.7–5.3)
PTH INTACT: 83.27 PG/ML (ref 15–65)
RBC # BLD: 5.29 M/UL (ref 4.21–5.77)
SODIUM BLD-SCNC: 138 MMOL/L (ref 135–144)
WBC # BLD: 6 K/UL (ref 3.5–11.3)

## 2020-08-03 PROCEDURE — 80048 BASIC METABOLIC PNL TOTAL CA: CPT

## 2020-08-03 PROCEDURE — 82043 UR ALBUMIN QUANTITATIVE: CPT

## 2020-08-03 PROCEDURE — 82040 ASSAY OF SERUM ALBUMIN: CPT

## 2020-08-03 PROCEDURE — 82570 ASSAY OF URINE CREATININE: CPT

## 2020-08-03 PROCEDURE — 36415 COLL VENOUS BLD VENIPUNCTURE: CPT

## 2020-08-03 PROCEDURE — 83970 ASSAY OF PARATHORMONE: CPT

## 2020-08-03 PROCEDURE — 85027 COMPLETE CBC AUTOMATED: CPT

## 2020-08-03 PROCEDURE — 84100 ASSAY OF PHOSPHORUS: CPT

## 2020-11-03 ENCOUNTER — HOSPITAL ENCOUNTER (OUTPATIENT)
Age: 85
Discharge: HOME | End: 2020-11-03
Payer: MEDICARE

## 2020-11-03 LAB
ALBUMIN SERPL-MCNC: 4.3 G/DL (ref 3.5–5.2)
ANION GAP SERPL CALCULATED.3IONS-SCNC: 8 MMOL/L (ref 9–17)
BUN BLDV-MCNC: 35 MG/DL (ref 8–23)
BUN/CREAT BLD: 21 (ref 9–20)
CALCIUM SERPL-MCNC: 9.7 MG/DL (ref 8.6–10.4)
CHLORIDE BLD-SCNC: 96 MMOL/L (ref 98–107)
CO2: 28 MMOL/L (ref 20–31)
CREAT SERPL-MCNC: 1.66 MG/DL (ref 0.7–1.2)
CREATININE URINE: 27.1 MG/DL (ref 39–259)
CREATININE URINE: 28.8 MG/DL (ref 39–259)
GFR AFRICAN AMERICAN: 48 ML/MIN
GFR NON-AFRICAN AMERICAN: 39 ML/MIN
GFR SERPL CREATININE-BSD FRML MDRD: ABNORMAL ML/MIN/{1.73_M2}
GFR SERPL CREATININE-BSD FRML MDRD: ABNORMAL ML/MIN/{1.73_M2}
GLUCOSE BLD-MCNC: 127 MG/DL (ref 70–99)
HCT VFR BLD CALC: 49.9 % (ref 40.7–50.3)
HEMOGLOBIN: 15.7 G/DL (ref 13–17)
MCH RBC QN AUTO: 29.5 PG (ref 25.2–33.5)
MCHC RBC AUTO-ENTMCNC: 31.5 G/DL (ref 28.4–34.8)
MCV RBC AUTO: 93.6 FL (ref 82.6–102.9)
MICROALBUMIN/CREAT 24H UR: <12 MG/L
MICROALBUMIN/CREAT UR-RTO: ABNORMAL MCG/MG CREAT
NRBC AUTOMATED: 0 PER 100 WBC
PDW BLD-RTO: 12.8 % (ref 11.8–14.4)
PHOSPHORUS: 3.1 MG/DL (ref 2.5–4.5)
PLATELET # BLD: NORMAL K/UL (ref 138–453)
PLATELET, FLUORESCENCE: 149 K/UL (ref 138–453)
PLATELET, IMMATURE FRACTION: 2.5 % (ref 1.1–10.3)
PMV BLD AUTO: NORMAL FL (ref 8.1–13.5)
POTASSIUM SERPL-SCNC: 4.9 MMOL/L (ref 3.7–5.3)
PTH INTACT: 88.1 PG/ML (ref 15–65)
RBC # BLD: 5.33 M/UL (ref 4.21–5.77)
SODIUM BLD-SCNC: 132 MMOL/L (ref 135–144)
TOTAL PROTEIN, URINE: 5 MG/DL
URINE TOTAL PROTEIN CREATININE RATIO: 0.17 (ref 0–0.2)
WBC # BLD: 6.9 K/UL (ref 3.5–11.3)

## 2020-11-03 PROCEDURE — 80048 BASIC METABOLIC PNL TOTAL CA: CPT

## 2020-11-03 PROCEDURE — 85055 RETICULATED PLATELET ASSAY: CPT

## 2020-11-03 PROCEDURE — 82570 ASSAY OF URINE CREATININE: CPT

## 2020-11-03 PROCEDURE — 83970 ASSAY OF PARATHORMONE: CPT

## 2020-11-03 PROCEDURE — 84156 ASSAY OF PROTEIN URINE: CPT

## 2020-11-03 PROCEDURE — 84100 ASSAY OF PHOSPHORUS: CPT

## 2020-11-03 PROCEDURE — 82040 ASSAY OF SERUM ALBUMIN: CPT

## 2020-11-03 PROCEDURE — 85027 COMPLETE CBC AUTOMATED: CPT

## 2020-11-03 PROCEDURE — 82043 UR ALBUMIN QUANTITATIVE: CPT

## 2020-11-03 PROCEDURE — 36415 COLL VENOUS BLD VENIPUNCTURE: CPT

## 2020-11-09 RX ORDER — UMECLIDINIUM BROMIDE AND VILANTEROL TRIFENATATE 62.5; 25 UG/1; UG/1
1 POWDER RESPIRATORY (INHALATION) DAILY
Qty: 3 EACH | Refills: 3 | Status: SHIPPED | OUTPATIENT
Start: 2020-11-09 | End: 2021-11-29 | Stop reason: SDUPTHER

## 2020-11-09 NOTE — TELEPHONE ENCOUNTER
We received a request from [employer] requesting a 90 day supply of his Anoro Ellipta instead of a 30 day supply. He can still use his local pharmacy. I called Candida Devries and asked if her prefers a 30 day or a 90 supply. He would like to switch to a 90 day supply. Please advise.

## 2020-11-23 ENCOUNTER — OFFICE VISIT (OUTPATIENT)
Dept: CARDIOLOGY | Age: 85
End: 2020-11-23
Payer: MEDICARE

## 2020-11-23 VITALS
HEART RATE: 76 BPM | DIASTOLIC BLOOD PRESSURE: 85 MMHG | BODY MASS INDEX: 29.66 KG/M2 | HEIGHT: 67 IN | RESPIRATION RATE: 16 BRPM | WEIGHT: 189 LBS | SYSTOLIC BLOOD PRESSURE: 159 MMHG

## 2020-11-23 PROCEDURE — 93288 INTERROG EVL PM/LDLS PM IP: CPT | Performed by: INTERNAL MEDICINE

## 2020-11-23 PROCEDURE — 99211 OFF/OP EST MAY X REQ PHY/QHP: CPT | Performed by: INTERNAL MEDICINE

## 2020-11-23 PROCEDURE — 4040F PNEUMOC VAC/ADMIN/RCVD: CPT | Performed by: INTERNAL MEDICINE

## 2020-11-23 PROCEDURE — 1036F TOBACCO NON-USER: CPT | Performed by: INTERNAL MEDICINE

## 2020-11-23 PROCEDURE — G8484 FLU IMMUNIZE NO ADMIN: HCPCS | Performed by: INTERNAL MEDICINE

## 2020-11-23 PROCEDURE — G8417 CALC BMI ABV UP PARAM F/U: HCPCS | Performed by: INTERNAL MEDICINE

## 2020-11-23 PROCEDURE — 93010 ELECTROCARDIOGRAM REPORT: CPT | Performed by: INTERNAL MEDICINE

## 2020-11-23 PROCEDURE — 93005 ELECTROCARDIOGRAM TRACING: CPT | Performed by: INTERNAL MEDICINE

## 2020-11-23 PROCEDURE — 99214 OFFICE O/P EST MOD 30 MIN: CPT | Performed by: INTERNAL MEDICINE

## 2020-11-23 PROCEDURE — 1123F ACP DISCUSS/DSCN MKR DOCD: CPT | Performed by: INTERNAL MEDICINE

## 2020-11-23 PROCEDURE — G8427 DOCREV CUR MEDS BY ELIG CLIN: HCPCS | Performed by: INTERNAL MEDICINE

## 2020-11-23 ASSESSMENT — ENCOUNTER SYMPTOMS
EYE DISCHARGE: 0
ABDOMINAL PAIN: 0
EYE PAIN: 0
PHOTOPHOBIA: 0
DOUBLE VISION: 0
BACK PAIN: 1
NAUSEA: 0
VOMITING: 0
BLURRED VISION: 0
SHORTNESS OF BREATH: 1
DIARRHEA: 0
COUGH: 0
HEARTBURN: 0
SPUTUM PRODUCTION: 0
ORTHOPNEA: 0
CONSTIPATION: 0
HEMOPTYSIS: 0

## 2020-11-23 NOTE — PATIENT INSTRUCTIONS
SURVEY:    You may be receiving a survey from [email or web address] regarding your visit today. Please complete the survey to enable us to provide the highest quality of care to you and your family. If you cannot score us a very good on any question, please call the office to discuss how we could have made your experience a very good one. Thank you.

## 2020-11-23 NOTE — PROGRESS NOTES
Ramonita Cadena RN am scribing for and in the presence of Albino Mckeon MD, F.A.C.C. Subjective:     CHIEF COMPLAINT / HPI:   Chief Complaint   Patient presents with    Follow-up     Hx: CHF, chronic AF, CAD. WEight at last visit wasPt reports feeling ok since last visit. SOB with inlcing three flights of stairs. Denies CP, palpitations, lightheadedness, dizziness. Dear Dr. Marilou Lopez MD    I had the pleasure of seeing Mr. Rangel for follow-up today. 1-Danny Rangel is 80 y.o. male with extensive cardiac history that include history of myocardial infarction status post bypass surgery in 1995, long-standing history of atrial fibrillation and history of abdominal aortic aneurysm status post repair. 2-Patient has long-standing history of atrial fibrillation that started after his bypass surgery, has been tried on multiple antiarrhythmic medications including sotalol which failed to control his atrial fibrillation, dofetilide 125 mg which has been discontinued at the time of his nephrectomy. He also has been tried on amiodarone but could not tolerate it because of side effect (lung toxicity). 3-Patient was reloaded with Tikosyn, which controlled his atrial fibrillation only partially. Patient was sent to [facility] for ablation but because of his age and his ling history of A Fib, they thought the success rate will not be that high. Decision is made to go for rate control and pacemaker placement. Patient underwent the procedure on 11/07/2016. 4-He was admitted for a low hemoglobin count (5/1/2018- 7.4) anemia secondary to acute blood loss from GI bleeding. His Xarelto is on hold. 5-Echocardiogram: 4/19/2018: EF 55% with moderate mitral regurgitation. 6-Cardiovascular stress test: 4/25/2018: Normal myocardial perfusion. 7-An admission to [facility] on 6/17/2018 because of large left-sided pleural effusion and acute on chronic diastolic CHF. Status post thoracentesis. Mr. Sugey Edwards reports doing fairly well since last visit. He has chronic shortness of breath on exertion but this is not getting any worse only notices it worsening when going up steps. He is walking on treadmill and uses weights. He says he also has been working out in his flower garden as well. He showed me on his phone how big of a garden he has. He is living with his wife and trying to stay home as much as he can because he does not want to contact Covid infection and bring it to her because she has bad lungs. Again he mentioned that he is working out on his treadmill at home 3 times weekly. No chest pain, pressure or tightness. No palpitations or dizziness. No problems with current medications. No fever or cough. No abdominal pain, nausea or vomiting. Bleeding Risks: Mr. Sugey Edwards denies any current or recent bleeding problems including a history of a GI bleed, ulcers, recent or upcoming surgeries, blood in his stool or black tarry stools or blood in his urine. Decreased 4 pounds since last visit   Wt Readings from Last 3 Encounters:   11/23/20 189 lb (85.7 kg)   08/04/20 183 lb 6.4 oz (83.2 kg)   06/05/20 181 lb 9.6 oz (82.4 kg)       Past Medical History:     Past Medical History:   Diagnosis Date    Abnormal ultrasound of lower extremity 7/22/15    Moderate to severe bilateral,multifocal,arterial insufficiency in  both legs. Minimal inflow component. Claudication present during exercise componet with worsenind NIVIA on the left post-exercise.  Atrial fibrillation [facility])     s/p pacemaker placement due to failure of multiple rhythm control strategies    Bladder [facility] ([facility]     CAD (coronary artery disease)     Dyspnea on exertion 5/7/2019    H/O echocardiogram 04/19/2018    EF 55%. LV cavity size is within normal limits,LV wall thickness is mildly increased. No definite specific wall motion abnormalities were identified. Mitral annular calcification. pacemaker. Current Medications:  Outpatient Medications Marked as Taking for the 11/23/20 encounter (Office Visit) with Master Samaniego MD   Medication Sig Dispense Refill    umeclidinium-vilanterol (ANORO ELLIPTA) 62.5-25 MCG/INH AEPB inhaler Inhale 1 puff into the lungs daily 3 each 3    torsemide (DEMADEX) 20 MG tablet Take 20 mg on mon wed and fri Take one half tablet on sun, tues,thurs and sat 30 tablet 5    metoprolol succinate (TOPROL XL) 100 MG extended release tablet TAKE 1 TABLET BY MOUTH TWICE A  tablet 3    apixaban (ELIQUIS) 2.5 MG TABS tablet Take 1 tablet by mouth 2 times daily 180 tablet 3    allopurinol (ZYLOPRIM) 100 MG tablet Take 1 tablet by mouth daily 90 tablet 3    pantoprazole (PROTONIX) 40 MG tablet TAKE 1 TABLET BY MOUTH EVERY DAY 30 tablet 10    acetaminophen-codeine (TYLENOL #3) 300-30 MG per tablet TAKE ONE TABLET BY MOUTH EVERY 6 HOURS AS NEEDED FOR 1 WEEK  0    glimepiride (AMARYL) 2 MG tablet Take 2 mg by mouth daily       Coenzyme Q10 (CO Q-10) 400 MG CAPS Take 100 mg by mouth 2 times daily       simvastatin (ZOCOR) 40 MG tablet Take 20 mg by mouth nightly       Multiple Vitamins-Minerals (CENTRUM SILVER) TABS Take 1 tablet by mouth daily          Review of Systems   Constitutional: Negative for chills, diaphoresis, fever, malaise/fatigue and weight loss. HENT: Negative for ear pain, hearing loss, nosebleeds and tinnitus. Eyes: Negative for blurred vision, double vision, photophobia, pain and discharge. Respiratory: Positive for shortness of breath. Negative for cough, hemoptysis and sputum production. Breathing is better. Shortness of breath on exertion. Cardiovascular: Negative for chest pain, palpitations, orthopnea, claudication, leg swelling and PND. Gastrointestinal: Negative for abdominal pain, constipation, diarrhea, heartburn, nausea and vomiting. Genitourinary: Negative. Musculoskeletal: Positive for back pain and joint pain. Negative for falls, myalgias and neck pain (.pt). Skin: Negative for itching and rash. Neurological: Negative for dizziness, speech change, focal weakness, loss of consciousness, weakness and headaches. Endo/Heme/Allergies: Negative for environmental allergies and polydipsia. Does not bruise/bleed easily. Psychiatric/Behavioral: Negative. Objective:     PHYSICAL EXAM:      VITALS:    BP (!) 159/85 (Site: Right Upper Arm, Position: Sitting, Cuff Size: Medium Adult)   Pulse 76   Resp 16   Ht 5' 7\" (1.702 m)   Wt 189 lb (85.7 kg)   BMI 29.60 kg/m²   CONSTITUTIONAL: Cooperative, no apparent distress, and appears well nourished / developed. NEUROLOGIC:  Awake and orientated to person, place and time. PSYCH: Calm affect. SKIN: Warm and dry. HEENT:  normocephalic, neck supple, no elevation of JVP, normal carotid pulses with no bruits and thyroid normal size. LUNGS: Mild decreased air entry at the left lung base. Cardiovascular: Normal rate, irregularly irregular rhythm, normal heart sounds. Exam reveals no gallop and no friction rubs. A IV/VI systolic was heard maximally at the 2nd right intercostal space. ABDOMEN:  Normal bowel sounds, non-distended and non-tender to palpation. + Colostomy  Extremities: No edema. No cyanosis and no clubbing. Pulses are 2+ radial and carotid pulses. 2+ dorsalis pedis and posterior tibial pulses bilaterally. Bilateral leg tenderness. DATA:    Lab Results   Component Value Date    CREATININE 1.66 (H) 11/03/2020    BUN 35 (H) 11/03/2020     (L) 11/03/2020    K 4.9 11/03/2020    CL 96 (L) 11/03/2020    CO2 28 11/03/2020         Assessment:      Diagnosis Orders   1. Chronic atrial fibrillation ([facility]     2. ASHD (arteriosclerotic heart disease)     3. Chronic diastolic heart failure ([address]     4. Mixed hyperlipidemia     5. Pacemaker     6. Essential hypertension     7. Chronic anticoagulation     8.  Coronary artery disease involving native coronary artery of native heart with angina pectoris ([facility]     9. Cardiac pacemaker in situ     10. Tachycardia-bradycardia syndrome ([facility]     11. Chronic kidney disease, stage IV (severe) ([facility])       Plan:     Chronic Atrial Fibrillation: Rate Control S/P pacemaker placement on 11/07/2016  Beta Blocker: Continue metoprolol succinate (Toprol XL) 100 mg twice a day. Stroke Risk: His CHADS2-VASc score is greater than 2 (2.2% stroke risk)  · Anticoagulation: Continue Eliquis 2.5 mg every 12 hours. History of intolerance to Xarelto before. The 2.5 mg Eliquis twice daily is dose appropriate giving his chronic kidney disease and age. He reported no bleeding complications. Atherosclerotic Heart Disease: History of CABG in 1995   Antiplatelet Agent: Patient is not taking ASA at this time. Diuretics: Continue Demadex  as directed by nephrology and also continue Spironolactone 12.5 mg daily. Beta Blocker: Continue metoprolol succinate (Toprol XL) 100 mg daily. Statin Therapy: Continue simvastatin (Zocor) 20 mg nightly. Chronic Diastolic Heart Failure: EF 55% on 4/2018  Beta Blocker: Continue Metorpolol 100 mg twice daily. Diuretics: Continue Demadex     History of chronic kidney disease, single kidney, follow-up with nephrology as previously scheduled. · Hyperlipidemia: Mixed  · Statin Therapy: Continue simvastatin (Zocor) 20 mg nightly.  Dual Chamber Pacemaker:   Indication for Device Placement: Tachycardia-Bradycardia Syndrome   Interrogation Findings: Normal pacemaker function. Chronic atrial fibrillation. Ventricular rate is controlled. We will continue monitoring via home monitor.   Essential Hypertension: Borderline controlled  · Beta Blocker: Continue Metoprolol tartrate (Lopressor) 100 mg bid. · Diuretics: Continue demadex as outline above   · I asked him to continue monitor blood pressure at home and call me back with a blood pressure log in the next week.   Patient has my cell phone number and he is always calling with update about his blood pressure, heart rate or symptoms. Finally, I recommended that he continue his other medications and follow up with you as previously scheduled. FOLLOW UP:  I told Mr. Rangel to call my office if he had any problems, but otherwise told him to Return in about 6 months (around 5/23/2021). However, I would be happy to see him sooner should the need arise. Sincerely,  Eryn Reynoso MD, F.A.C.C. [facility] Cardiology Specialist    [address] [place] [address]  Phone: [phone], Fax: [phone]     I believe that the risk of significant morbidity and mortality related to the patient's current medical conditions are: intermediate-high. The documentation recorded by the scribe, accurately and completely reflects the services I personally performed and the decisions made by me. Eryn Reynoso MD, F.A.C.C.  November 23, 2020

## 2021-02-04 ENCOUNTER — HOSPITAL ENCOUNTER (OUTPATIENT)
Age: 86
Discharge: HOME | End: 2021-02-04
Payer: MEDICARE

## 2021-02-04 DIAGNOSIS — D63.1 ANEMIA DUE TO STAGE 3 CHRONIC KIDNEY DISEASE (HCC): ICD-10-CM

## 2021-02-04 DIAGNOSIS — N18.30 CHRONIC KIDNEY DISEASE, STAGE 3 (HCC): ICD-10-CM

## 2021-02-04 DIAGNOSIS — I10 ESSENTIAL HYPERTENSION: ICD-10-CM

## 2021-02-04 DIAGNOSIS — E11.22 TYPE 2 DIABETES MELLITUS WITH STAGE 3 CHRONIC KIDNEY DISEASE, WITHOUT LONG-TERM CURRENT USE OF INSULIN (HCC): ICD-10-CM

## 2021-02-04 DIAGNOSIS — N18.30 TYPE 2 DIABETES MELLITUS WITH STAGE 3 CHRONIC KIDNEY DISEASE, WITHOUT LONG-TERM CURRENT USE OF INSULIN (HCC): ICD-10-CM

## 2021-02-04 DIAGNOSIS — N18.30 ANEMIA DUE TO STAGE 3 CHRONIC KIDNEY DISEASE (HCC): ICD-10-CM

## 2021-02-04 LAB
ALBUMIN SERPL-MCNC: 4.2 G/DL (ref 3.5–5.2)
ANION GAP SERPL CALCULATED.3IONS-SCNC: 9 MMOL/L (ref 9–17)
BUN BLDV-MCNC: 37 MG/DL (ref 8–23)
BUN/CREAT BLD: 22 (ref 9–20)
CALCIUM SERPL-MCNC: 9.3 MG/DL (ref 8.6–10.4)
CHLORIDE BLD-SCNC: 104 MMOL/L (ref 98–107)
CO2: 29 MMOL/L (ref 20–31)
CREAT SERPL-MCNC: 1.65 MG/DL (ref 0.7–1.2)
CREATININE URINE: 30.5 MG/DL (ref 39–259)
GFR AFRICAN AMERICAN: 48 ML/MIN
GFR NON-AFRICAN AMERICAN: 39 ML/MIN
GFR SERPL CREATININE-BSD FRML MDRD: ABNORMAL ML/MIN/{1.73_M2}
GFR SERPL CREATININE-BSD FRML MDRD: ABNORMAL ML/MIN/{1.73_M2}
GLUCOSE BLD-MCNC: 230 MG/DL (ref 70–99)
HCT VFR BLD CALC: 50.2 % (ref 40.7–50.3)
HEMOGLOBIN: 15.4 G/DL (ref 13–17)
MCH RBC QN AUTO: 28.5 PG (ref 25.2–33.5)
MCHC RBC AUTO-ENTMCNC: 30.7 G/DL (ref 28.4–34.8)
MCV RBC AUTO: 93 FL (ref 82.6–102.9)
NRBC AUTOMATED: 0 PER 100 WBC
PDW BLD-RTO: 13 % (ref 11.8–14.4)
PHOSPHORUS: 3.1 MG/DL (ref 2.5–4.5)
PLATELET # BLD: 140 K/UL (ref 138–453)
PMV BLD AUTO: 9.3 FL (ref 8.1–13.5)
POTASSIUM SERPL-SCNC: 4.6 MMOL/L (ref 3.7–5.3)
RBC # BLD: 5.4 M/UL (ref 4.21–5.77)
SODIUM BLD-SCNC: 142 MMOL/L (ref 135–144)
TOTAL PROTEIN, URINE: 8 MG/DL
URINE TOTAL PROTEIN CREATININE RATIO: 0.26 (ref 0–0.2)
WBC # BLD: 5.6 K/UL (ref 3.5–11.3)

## 2021-02-04 PROCEDURE — 85027 COMPLETE CBC AUTOMATED: CPT

## 2021-02-04 PROCEDURE — 83970 ASSAY OF PARATHORMONE: CPT

## 2021-02-04 PROCEDURE — 82570 ASSAY OF URINE CREATININE: CPT

## 2021-02-04 PROCEDURE — 36415 COLL VENOUS BLD VENIPUNCTURE: CPT

## 2021-02-04 PROCEDURE — 80048 BASIC METABOLIC PNL TOTAL CA: CPT

## 2021-02-04 PROCEDURE — 84156 ASSAY OF PROTEIN URINE: CPT

## 2021-02-04 PROCEDURE — 84100 ASSAY OF PHOSPHORUS: CPT

## 2021-02-04 PROCEDURE — 82040 ASSAY OF SERUM ALBUMIN: CPT

## 2021-02-05 LAB — PTH INTACT: 88.47 PG/ML (ref 15–65)

## 2021-02-08 ENCOUNTER — CLINICAL DOCUMENTATION (OUTPATIENT)
Dept: NEPHROLOGY | Age: 86
End: 2021-02-08

## 2021-02-08 RX ORDER — SPIRONOLACTONE 25 MG/1
25 TABLET ORAL DAILY
Qty: 90 TABLET | Refills: 3 | Status: SHIPPED | OUTPATIENT
Start: 2021-02-08 | End: 2021-09-23 | Stop reason: SDUPTHER

## 2021-02-12 ENCOUNTER — HOSPITAL ENCOUNTER (OUTPATIENT)
Age: 86
Discharge: HOME | End: 2021-02-12
Payer: MEDICARE

## 2021-02-12 DIAGNOSIS — N18.32 STAGE 3B CHRONIC KIDNEY DISEASE (HCC): ICD-10-CM

## 2021-02-12 LAB
ANION GAP SERPL CALCULATED.3IONS-SCNC: 10 MMOL/L (ref 9–17)
BUN BLDV-MCNC: 42 MG/DL (ref 8–23)
BUN/CREAT BLD: 26 (ref 9–20)
CALCIUM SERPL-MCNC: 9.7 MG/DL (ref 8.6–10.4)
CHLORIDE BLD-SCNC: 102 MMOL/L (ref 98–107)
CO2: 27 MMOL/L (ref 20–31)
CREAT SERPL-MCNC: 1.64 MG/DL (ref 0.7–1.2)
GFR AFRICAN AMERICAN: 48 ML/MIN
GFR NON-AFRICAN AMERICAN: 40 ML/MIN
GFR SERPL CREATININE-BSD FRML MDRD: ABNORMAL ML/MIN/{1.73_M2}
GFR SERPL CREATININE-BSD FRML MDRD: ABNORMAL ML/MIN/{1.73_M2}
GLUCOSE BLD-MCNC: 184 MG/DL (ref 70–99)
POTASSIUM SERPL-SCNC: 4.6 MMOL/L (ref 3.7–5.3)
SODIUM BLD-SCNC: 139 MMOL/L (ref 135–144)

## 2021-02-12 PROCEDURE — 80048 BASIC METABOLIC PNL TOTAL CA: CPT

## 2021-02-12 PROCEDURE — 36415 COLL VENOUS BLD VENIPUNCTURE: CPT

## 2021-05-24 ENCOUNTER — OFFICE VISIT (OUTPATIENT)
Dept: CARDIOLOGY | Age: 86
End: 2021-05-24
Payer: MEDICARE

## 2021-05-24 VITALS
SYSTOLIC BLOOD PRESSURE: 148 MMHG | DIASTOLIC BLOOD PRESSURE: 74 MMHG | OXYGEN SATURATION: 98 % | RESPIRATION RATE: 17 BRPM | BODY MASS INDEX: 30.32 KG/M2 | HEIGHT: 67 IN | HEART RATE: 78 BPM | WEIGHT: 193.2 LBS

## 2021-05-24 DIAGNOSIS — I25.10 ASHD (ARTERIOSCLEROTIC HEART DISEASE): ICD-10-CM

## 2021-05-24 DIAGNOSIS — E78.2 MIXED HYPERLIPIDEMIA: ICD-10-CM

## 2021-05-24 DIAGNOSIS — Z95.0 S/P PLACEMENT OF CARDIAC PACEMAKER: ICD-10-CM

## 2021-05-24 DIAGNOSIS — I10 ESSENTIAL HYPERTENSION: ICD-10-CM

## 2021-05-24 DIAGNOSIS — Z95.1 S/P CABG (CORONARY ARTERY BYPASS GRAFT): ICD-10-CM

## 2021-05-24 DIAGNOSIS — I48.20 CHRONIC A-FIB (HCC): Primary | ICD-10-CM

## 2021-05-24 DIAGNOSIS — I50.32 CHRONIC DIASTOLIC CONGESTIVE HEART FAILURE (HCC): ICD-10-CM

## 2021-05-24 DIAGNOSIS — I49.5 TACHY-BRADY SYNDROME (HCC): ICD-10-CM

## 2021-05-24 PROCEDURE — G8427 DOCREV CUR MEDS BY ELIG CLIN: HCPCS | Performed by: INTERNAL MEDICINE

## 2021-05-24 PROCEDURE — 99211 OFF/OP EST MAY X REQ PHY/QHP: CPT | Performed by: INTERNAL MEDICINE

## 2021-05-24 PROCEDURE — 1036F TOBACCO NON-USER: CPT | Performed by: INTERNAL MEDICINE

## 2021-05-24 PROCEDURE — 4040F PNEUMOC VAC/ADMIN/RCVD: CPT | Performed by: INTERNAL MEDICINE

## 2021-05-24 PROCEDURE — 1123F ACP DISCUSS/DSCN MKR DOCD: CPT | Performed by: INTERNAL MEDICINE

## 2021-05-24 PROCEDURE — 93288 INTERROG EVL PM/LDLS PM IP: CPT | Performed by: INTERNAL MEDICINE

## 2021-05-24 PROCEDURE — G8417 CALC BMI ABV UP PARAM F/U: HCPCS | Performed by: INTERNAL MEDICINE

## 2021-05-24 PROCEDURE — 99214 OFFICE O/P EST MOD 30 MIN: CPT | Performed by: INTERNAL MEDICINE

## 2021-05-24 ASSESSMENT — ENCOUNTER SYMPTOMS
SPUTUM PRODUCTION: 0
BLURRED VISION: 0
HEARTBURN: 0
DIARRHEA: 0
ABDOMINAL PAIN: 0
BACK PAIN: 1
PHOTOPHOBIA: 0
SHORTNESS OF BREATH: 1
ORTHOPNEA: 0
COUGH: 0
EYE DISCHARGE: 0
EYE PAIN: 0
HEMOPTYSIS: 0
VOMITING: 0
CONSTIPATION: 0
DOUBLE VISION: 0
NAUSEA: 0

## 2021-05-24 NOTE — PROGRESS NOTES
thoracentesis. 8- EKG done in office on 11/23/2020. 9-Pacemaker checked today in office on 5/24/2021: Normal pacemaker function. Chronic atrial fibrillation. Ventricular rate is controlled. Expected battery is 4.5 years. Mr. Zohreh Dolan is here for his 6 month follow up. He reports he has been doing alright. He reports his breathing is stable but with heavy exertion, he will have some shortness of breath. He reports he has gained about 20 pounds total since he last saw me. He has a hard time falling asleep at night and noticed the closer to morning time, the better he sleeps. He goes a mile on the treadmill and that helps tire him to sleep better. He denies having any chest pain, pressure or tightness. He denies having any lightheaded/dizziness or palpitations. No abdominal pain, nausea or vomiting. No cough, fever or chills. No bleeding problems, bowel issues, problems with medications or any other concerns at this time. Bleeding Risks: Mr. Zohreh Dolan denies any current or recent bleeding problems including a history of a GI bleed, ulcers, recent or upcoming surgeries, blood in his stool or black tarry stools or blood in his urine. Past Medical History:     Past Medical History:   Diagnosis Date    Abnormal ultrasound of lower extremity 7/22/15    Moderate to severe bilateral,multifocal,arterial insufficiency in  both legs. Minimal inflow component. Claudication present during exercise componet with worsenind NIVIA on the left post-exercise.  Atrial fibrillation [facility])     s/p pacemaker placement due to failure of multiple rhythm control strategies    Bladder cancer ([facility] [address]     CAD (coronary artery disease)     Dyspnea on exertion 5/7/2019    H/O echocardiogram 04/19/2018    EF 55%. LV cavity size is within normal limits,LV wall thickness is mildly increased. No definite specific wall motion abnormalities were identified. Mitral annular calcification.  Myxomatous thickening of the mitral leaflets. Moderate mitral regurg. rhythm of what appeared to be atrial fib.  H/O total cystectomy 2/5/2019    For bladder CA in 2006, has an ileal conduit    History of abdominal aortic aneurysm (AAA) 2/5/2019    S/p repair 1993     History of bleeding ulcers     duodenal    History of Holter monitoring 9/21/15    atrial fibrillation/flutter throughout with an average HR of 67 bpm. 78 pauses >2 seconds and 1 pause >3 seconds,but all occured during typical hours of sleep.  History of Holter monitoring 5/27/16    Paroxysmal atrial fib with rapid ventricular response (A-Fib 38% of the time). Occasional isolated PVC's    History of Holter monitoring 08/09/2016    Atrial Fibrillation throughout with heart rates ranging between  bpm.  Four 2 second pauses. Occasional PVC's    History of stress test 04/25/2018    Normal. LV systolic function cannot be assessed because of no gating. ECG is uninterpretable at baseline because of demand ventricular pacing. Low risk for significant CAD.  Hypertension     Pacemaker 11/07/2016    [employer]     Polymyalgia rheumatica [facility])      Past Surgical History:  Past Surgical History:   Procedure Laterality Date    ABDOMINAL AORTIC ANEURYSM [address] [facility]    APPENDECTOMY      BLADDER SURGERY      CATARACT REMOVAL Bilateral     CHOLECYSTECTOMY      CORONARY ARTERY BYPASS GRAFT  1995    x3 [facility]    KIDNEY [facility]OVAL Left 2015    [facility]    PACEMAKER INSERTION      PROSTATECTOMY      TONSILLECTOMY AND ADENOIDECTOMY       Social History:    Social History     Tobacco Use   Smoking Status Former Smoker    Packs/day: 1.00    Years: 50.00    Pack years: 50.00    Types: Cigarettes, Pipe    Quit date: 2006    Years since quitting: 15.4   Smokeless Tobacco Never Used     Family history reviewed, noncontributory. Patient with known history of CAD, CHF, A. fib and pacemaker.     Current Medications:  Outpatient Medications Marked as Taking for the 5/24/21 encounter (Office Visit) with Almas Paula MD   Medication Sig Dispense Refill    metoprolol succinate (TOPROL XL) 100 MG extended release tablet TAKE ONE TABLET BY MOUTH TWICE A  tablet 3    spironolactone (ALDACTONE) 25 MG tablet Take 1 tablet by mouth daily 90 tablet 3    NONFORMULARY 1 capsule daily Pt takes Balance of Nutrition daily as 3 of Vegetables Capsules and 3 of the fruit capsules.  senna (SENOKOT) 8.6 MG tablet as needed      ELIQUIS 2.5 MG TABS tablet TAKE ONE TABLET BY MOUTH TWICE A  tablet 3    umeclidinium-vilanterol (ANORO ELLIPTA) 62.5-25 MCG/INH AEPB inhaler Inhale 1 puff into the lungs daily 3 each 3    torsemide (DEMADEX) 20 MG tablet Take 20 mg on mon wed and fri Take one half tablet on sun, tues,thurs and sat 30 tablet 5    albuterol sulfate  (90 Base) MCG/ACT inhaler Inhale 2 puffs into the lungs 4 times daily as needed for Wheezing 1 Inhaler 0    albuterol (PROVENTIL) (2.5 MG/3ML) 0.083% nebulizer solution Take 3 mLs by nebulization every 6 hours as needed for Wheezing or Shortness of Breath 120 each 3    glimepiride (AMARYL) 2 MG tablet Take 2 mg by mouth daily       Coenzyme Q10 (CO Q-10) 400 MG CAPS Take 100 mg by mouth 2 times daily       simvastatin (ZOCOR) 20 MG tablet Take 20 mg by mouth nightly       Multiple Vitamins-Minerals (CENTRUM SILVER) TABS Take 1 tablet by mouth daily          Review of Systems   Constitutional: Negative for chills, diaphoresis, fever, malaise/fatigue and weight loss. HENT: Negative for ear pain, hearing loss, nosebleeds and tinnitus. Eyes: Negative for blurred vision, double vision, photophobia, pain and discharge. Respiratory: Positive for shortness of breath. Negative for cough, hemoptysis and sputum production. Breathing is better. Shortness of breath on exertion.    Cardiovascular: Negative for chest pain, palpitations, orthopnea, claudication, leg swelling and PND.   Gastrointestinal: Negative for abdominal pain, constipation, diarrhea, heartburn, nausea and vomiting. Genitourinary: Negative. Musculoskeletal: Positive for back pain and joint pain. Negative for falls, myalgias and neck pain (.pt). Skin: Negative for itching and rash. Neurological: Negative for dizziness, speech change, focal weakness, loss of consciousness, weakness and headaches. Endo/Heme/Allergies: Negative for environmental allergies and polydipsia. Does not bruise/bleed easily. Psychiatric/Behavioral: Negative. Objective:     PHYSICAL EXAM:      VITALS:    BP (!) 148/74 (Site: Right Upper Arm, Position: Sitting, Cuff Size: Medium Adult)   Pulse 78   Resp 17   Ht 5' 7\" (1.702 m)   Wt 193 lb 3.2 oz (87.6 kg)   SpO2 98%   BMI 30.26 kg/m²   CONSTITUTIONAL: Cooperative, no apparent distress, and appears well nourished / developed. NEUROLOGIC:  Awake and orientated to person, place and time. PSYCH: Calm affect. SKIN: Warm and dry. HEENT:  normocephalic, neck supple, no elevation of JVP, normal carotid pulses with no bruits and thyroid normal size. LUNGS: Mild decreased air entry at the left lung base. Cardiovascular: Normal rate, irregularly irregular rhythm, normal heart sounds. Exam reveals no gallop and no friction rubs. A IV/VI systolic was heard maximally at the 2nd right intercostal space. ABDOMEN:  Normal bowel sounds, non-distended and non-tender to palpation. + Colostomy  Extremities: No edema. No cyanosis and no clubbing. Pulses are 2+ radial and carotid pulses. 2+ dorsalis pedis and posterior tibial pulses bilaterally. Bilateral leg tenderness. DATA:    Lab Results   Component Value Date    CREATININE 1.64 (H) 02/12/2021    BUN 42 (H) 02/12/2021     02/12/2021    K 4.6 02/12/2021     02/12/2021    CO2 27 02/12/2021       Assessment:      Diagnosis Orders   1. Chronic a-fib ([address]     2. ASHD (arteriosclerotic heart disease)     3.  S/P CABG (coronary artery bypass graft)     4. Chronic diastolic congestive heart failure ([address]     5. S/P placement of cardiac pacemaker     6. Mixed hyperlipidemia     7. Essential hypertension       Plan:     Chronic Atrial Fibrillation: Rate Control S/P pacemaker placement on 11/07/2016  Beta Blocker: Continue metoprolol succinate (Toprol XL) 100 mg twice a day. Stroke Risk: His CHADS2-VASc score is greater than 2 (2.2% stroke risk)  · Anticoagulation: Continue Apixaban (Eliquis) 2.5 mg every 12 hours. Atherosclerotic Heart Disease: History of CABG in 1995   Antiplatelet Agent: Not indicated at this time. Diuretics: Continue torsemide (Demodex) as directed by nephrology     Diuretics: Continue Spironolactone (Aldactone) 25 mg daily. Beta Blocker: Continue metoprolol succinate (Toprol XL) 100 mg twice a day. Cholesterol Reduction Therapy: Continue simvastatin (Zocor) 20 mg daily. Chronic Diastolic Heart Failure: EF 55% on 4/2018  Beta Blocker: Continue metoprolol succinate (Toprol XL) 100 mg twice a day. Diuretics: Continue torsemide (Demodex) as directed by nephrology     Diuretics: Continue Spironolactone (Aldactone) 25 mg daily. History of chronic kidney disease, single kidney, follow-up with nephrology as previously scheduled.   Essential Hypertension: Borderline controlled  · Beta Blocker: Continue metoprolol succinate (Toprol XL) 100 mg twice a day. Diuretics: Continue torsemide (Demodex) as directed by nephrology     Diuretics: Continue Spironolactone (Aldactone) 25 mg daily. · I asked him to continue monitor blood pressure at home and call me back with a blood pressure log in the next week. Patient has my cell phone number and he is always calling with update about his blood pressure, heart rate or symptoms. · Hyperlipidemia: Mixed  · Cholesterol Reduction Therapy: Continue simvastatin (Zocor) 20 mg daily.  I discussed the potential benefits of statin therapy as well as the potential risks including myalgia as well as the rare but potentially serious complication of liver or kidney damage. Although rare, I told them that this could be serious and therefore told them to stop the medication immediately and call if they developed any severe muscle aches or pains and they agreed to do so.  Dual Chamber Pacemaker:   Indication for Device Placement: Tachycardia-Bradycardia Syndrome   Interrogation Findings: Within normal limits and therefore no changes were made. Finally, I recommended that he continue his other medications and follow up with you as previously scheduled. FOLLOW UP:  I told Mr. Rangel to call my office if he had any problems, but otherwise told him to Return in about 6 months (around 11/24/2021). However, I would be happy to see him sooner should the need arise. Sincerely,  Mj Glass MD, F.A.C.C. [facility] Cardiology Specialist    [address] [place] [address]  Phone: [phone], Fax: [phone]     I believe that the risk of significant morbidity and mortality related to the patient's current medical conditions are: Intermediate. >30 minutes were spent during prep work, discussion and exam of the patient, and follow up documentation and all of their questions were answered. The documentation recorded by the scribe, accurately and completely reflects the services I personally performed and the decisions made by me. Mj Glass MD, F.A.C.C.  May 24, 2021

## 2021-05-24 NOTE — PATIENT INSTRUCTIONS
SURVEY:    You may be receiving a survey from [employer] regarding your visit today. Please complete the survey to enable us to provide the highest quality of care to you and your family. If you cannot score us a very good on any question, please call the office to discuss how we could have made your experience a very good one. Thank you.

## 2021-06-22 ENCOUNTER — HOSPITAL ENCOUNTER (OUTPATIENT)
Age: 86
Discharge: HOME | End: 2021-06-22
Payer: MEDICARE

## 2021-06-22 DIAGNOSIS — I10 ESSENTIAL HYPERTENSION: ICD-10-CM

## 2021-06-22 DIAGNOSIS — E11.22 TYPE 2 DIABETES MELLITUS WITH STAGE 3 CHRONIC KIDNEY DISEASE, WITHOUT LONG-TERM CURRENT USE OF INSULIN (HCC): ICD-10-CM

## 2021-06-22 DIAGNOSIS — N18.30 ANEMIA DUE TO STAGE 3 CHRONIC KIDNEY DISEASE (HCC): ICD-10-CM

## 2021-06-22 DIAGNOSIS — N18.30 TYPE 2 DIABETES MELLITUS WITH STAGE 3 CHRONIC KIDNEY DISEASE, WITHOUT LONG-TERM CURRENT USE OF INSULIN (HCC): ICD-10-CM

## 2021-06-22 DIAGNOSIS — N18.30 CHRONIC KIDNEY DISEASE, STAGE 3 (HCC): ICD-10-CM

## 2021-06-22 DIAGNOSIS — D63.1 ANEMIA DUE TO STAGE 3 CHRONIC KIDNEY DISEASE (HCC): ICD-10-CM

## 2021-06-22 LAB
ALBUMIN SERPL-MCNC: 4.1 G/DL (ref 3.5–5.2)
ANION GAP SERPL CALCULATED.3IONS-SCNC: 9 MMOL/L (ref 9–17)
BUN BLDV-MCNC: 31 MG/DL (ref 8–23)
BUN/CREAT BLD: 21 (ref 9–20)
CALCIUM SERPL-MCNC: 9.5 MG/DL (ref 8.6–10.4)
CHLORIDE BLD-SCNC: 103 MMOL/L (ref 98–107)
CO2: 25 MMOL/L (ref 20–31)
CREAT SERPL-MCNC: 1.47 MG/DL (ref 0.7–1.2)
CREATININE URINE: 70.3 MG/DL (ref 39–259)
GFR AFRICAN AMERICAN: 55 ML/MIN
GFR NON-AFRICAN AMERICAN: 45 ML/MIN
GFR SERPL CREATININE-BSD FRML MDRD: ABNORMAL ML/MIN/{1.73_M2}
GFR SERPL CREATININE-BSD FRML MDRD: ABNORMAL ML/MIN/{1.73_M2}
GLUCOSE BLD-MCNC: 201 MG/DL (ref 70–99)
HCT VFR BLD CALC: 49.7 % (ref 40.7–50.3)
HEMOGLOBIN: 15.6 G/DL (ref 13–17)
MCH RBC QN AUTO: 29.2 PG (ref 25.2–33.5)
MCHC RBC AUTO-ENTMCNC: 31.4 G/DL (ref 28.4–34.8)
MCV RBC AUTO: 92.9 FL (ref 82.6–102.9)
NRBC AUTOMATED: 0 PER 100 WBC
PDW BLD-RTO: 13.1 % (ref 11.8–14.4)
PHOSPHORUS: 2.7 MG/DL (ref 2.5–4.5)
PLATELET # BLD: 143 K/UL (ref 138–453)
PMV BLD AUTO: 9.8 FL (ref 8.1–13.5)
POTASSIUM SERPL-SCNC: 5 MMOL/L (ref 3.7–5.3)
PTH INTACT: 82.97 PG/ML (ref 15–65)
RBC # BLD: 5.35 M/UL (ref 4.21–5.77)
SODIUM BLD-SCNC: 137 MMOL/L (ref 135–144)
TOTAL PROTEIN, URINE: 37 MG/DL
URINE TOTAL PROTEIN CREATININE RATIO: 0.53 (ref 0–0.2)
WBC # BLD: 6.6 K/UL (ref 3.5–11.3)

## 2021-06-22 PROCEDURE — 83970 ASSAY OF PARATHORMONE: CPT

## 2021-06-22 PROCEDURE — 82040 ASSAY OF SERUM ALBUMIN: CPT

## 2021-06-22 PROCEDURE — 82570 ASSAY OF URINE CREATININE: CPT

## 2021-06-22 PROCEDURE — 84156 ASSAY OF PROTEIN URINE: CPT

## 2021-06-22 PROCEDURE — 80048 BASIC METABOLIC PNL TOTAL CA: CPT

## 2021-06-22 PROCEDURE — 85027 COMPLETE CBC AUTOMATED: CPT

## 2021-06-22 PROCEDURE — 36415 COLL VENOUS BLD VENIPUNCTURE: CPT

## 2021-06-22 PROCEDURE — 84100 ASSAY OF PHOSPHORUS: CPT

## 2021-06-29 ENCOUNTER — NURSE ONLY (OUTPATIENT)
Dept: CARDIOLOGY | Age: 86
End: 2021-06-29
Payer: MEDICARE

## 2021-06-29 DIAGNOSIS — I49.5 TACHY-BRADY SYNDROME (HCC): Primary | ICD-10-CM

## 2021-06-29 DIAGNOSIS — Z95.0 PACEMAKER: ICD-10-CM

## 2021-07-02 PROCEDURE — 93294 REM INTERROG EVL PM/LDLS PM: CPT | Performed by: INTERNAL MEDICINE

## 2021-11-08 ENCOUNTER — OFFICE VISIT (OUTPATIENT)
Dept: CARDIOLOGY | Age: 86
End: 2021-11-08
Payer: MEDICARE

## 2021-11-08 VITALS
DIASTOLIC BLOOD PRESSURE: 71 MMHG | WEIGHT: 197.6 LBS | BODY MASS INDEX: 31.01 KG/M2 | HEIGHT: 67 IN | SYSTOLIC BLOOD PRESSURE: 151 MMHG | HEART RATE: 77 BPM | OXYGEN SATURATION: 98 % | RESPIRATION RATE: 18 BRPM

## 2021-11-08 DIAGNOSIS — I10 ESSENTIAL HYPERTENSION: ICD-10-CM

## 2021-11-08 DIAGNOSIS — Z95.0 S/P PLACEMENT OF CARDIAC PACEMAKER: ICD-10-CM

## 2021-11-08 DIAGNOSIS — I50.32 CHRONIC DIASTOLIC CONGESTIVE HEART FAILURE (HCC): ICD-10-CM

## 2021-11-08 DIAGNOSIS — I49.5 TACHY-BRADY SYNDROME (HCC): ICD-10-CM

## 2021-11-08 DIAGNOSIS — I25.10 ASHD (ARTERIOSCLEROTIC HEART DISEASE): ICD-10-CM

## 2021-11-08 DIAGNOSIS — E78.2 MIXED HYPERLIPIDEMIA: ICD-10-CM

## 2021-11-08 DIAGNOSIS — Z95.1 S/P CABG (CORONARY ARTERY BYPASS GRAFT): ICD-10-CM

## 2021-11-08 DIAGNOSIS — I48.20 CHRONIC A-FIB (HCC): Primary | ICD-10-CM

## 2021-11-08 PROCEDURE — 93010 ELECTROCARDIOGRAM REPORT: CPT | Performed by: INTERNAL MEDICINE

## 2021-11-08 PROCEDURE — G8417 CALC BMI ABV UP PARAM F/U: HCPCS | Performed by: INTERNAL MEDICINE

## 2021-11-08 PROCEDURE — G8484 FLU IMMUNIZE NO ADMIN: HCPCS | Performed by: INTERNAL MEDICINE

## 2021-11-08 PROCEDURE — 99211 OFF/OP EST MAY X REQ PHY/QHP: CPT | Performed by: INTERNAL MEDICINE

## 2021-11-08 PROCEDURE — 4040F PNEUMOC VAC/ADMIN/RCVD: CPT | Performed by: INTERNAL MEDICINE

## 2021-11-08 PROCEDURE — 93005 ELECTROCARDIOGRAM TRACING: CPT | Performed by: INTERNAL MEDICINE

## 2021-11-08 PROCEDURE — 99214 OFFICE O/P EST MOD 30 MIN: CPT | Performed by: INTERNAL MEDICINE

## 2021-11-08 PROCEDURE — 1036F TOBACCO NON-USER: CPT | Performed by: INTERNAL MEDICINE

## 2021-11-08 PROCEDURE — G8427 DOCREV CUR MEDS BY ELIG CLIN: HCPCS | Performed by: INTERNAL MEDICINE

## 2021-11-08 PROCEDURE — 1123F ACP DISCUSS/DSCN MKR DOCD: CPT | Performed by: INTERNAL MEDICINE

## 2021-11-08 ASSESSMENT — ENCOUNTER SYMPTOMS
SHORTNESS OF BREATH: 1
DOUBLE VISION: 0
HEMOPTYSIS: 0
EYE PAIN: 0
DIARRHEA: 0
BLURRED VISION: 0
COUGH: 0
PHOTOPHOBIA: 0
BACK PAIN: 1
HEARTBURN: 0
ORTHOPNEA: 0
VOMITING: 0
CONSTIPATION: 0
SPUTUM PRODUCTION: 0
NAUSEA: 0
EYE DISCHARGE: 0
ABDOMINAL PAIN: 0

## 2021-11-08 NOTE — PATIENT INSTRUCTIONS
SURVEY:    You may be receiving a survey from [employer] regarding your visit today. Please complete the survey to enable us to provide the highest quality of care to you and your family. If you cannot score us a very good on any question, please call the office to discuss how we could have made your experience a very good one. Thank you.

## 2021-11-08 NOTE — PROGRESS NOTES
Racheal Herrera am scribing for and in the presence of Taras Goldmann, MD, F.A.C.C..    Subjective:     CHIEF COMPLAINT / HPI:   Chief Complaint   Patient presents with    Follow-up     Pacer check. HX:Chronic AFib, ASHD s/p CABG, CHF, Pacer, HLD, HTN Pt is here for follow up today     Dear Dr. Isatu Fischer MD    I had the pleasure of seeing Mr. Rangel for follow-up today. 1-Danny Rangel is 80 y.o. male with extensive cardiac history that include history of myocardial infarction status post bypass surgery in 1995, long-standing history of atrial fibrillation and history of abdominal aortic aneurysm status post repair. 2-Patient has long-standing history of atrial fibrillation that started after his bypass surgery, has been tried on multiple antiarrhythmic medications including sotalol which failed to control his atrial fibrillation, dofetilide 125 mg which has been discontinued at the time of his nephrectomy. He also has been tried on amiodarone but could not tolerate it because of side effect (lung toxicity). 3-Patient was reloaded with Tikosyn, which controlled his atrial fibrillation only partially. Patient was sent to [facility] for ablation but because of his age and his ling history of A Fib, they thought the success rate will not be that high. Decision is made to go for rate control and pacemaker placement. Patient underwent the procedure on 11/07/2016. 4-He was admitted for a low hemoglobin count (5/1/2018- 7.4) anemia secondary to acute blood loss from GI bleeding. His Xarelto is on hold. 5-Echocardiogram: 4/19/2018: EF 55% with moderate mitral regurgitation. 6-Cardiovascular stress test: 4/25/2018: Normal myocardial perfusion. 7-An admission to [facility] on 6/17/2018 because of large left-sided pleural effusion and acute on chronic diastolic CHF. Status post thoracentesis. 8- EKG done in office on 11/23/2020.      9-Pacemaker checked today in office on 5/24/2021: Normal pacemaker function. Chronic atrial fibrillation. Ventricular rate is controlled. Expected battery is 4.5 years. Mr. Felix Spicer is here for his 6 month follow up. He reports he has been doing alright. He has chronic, stable shortness of breath on exertion but this has stayed the same since last visit. He is being [facility] at [facility] for cerebral aneurysm. The plan was to do cerebral MRA and MRI but this was not possible because of his non-MRI compatible pacemaker. He may get a repeat CT/CTA of the head and neck. He denies any chest pain, pressure or tightness. No orthopnea or PND. No fever or cough. He has gained some weight since last visit and his blood pressure is elevated. I reviewed his blood work, kidney function stable. I had a very long discussion with him regarding proper control of the blood pressure giving his extensive history of heart disease and cerebral aneurysm. He is also on a blood thinner and I told him we need to control the blood pressure to avoid intracranial bleeding. He said he is going to monitor blood pressure at home twice a day and update me with a blood pressure log in 1 week. We will make the necessary changes in his medications according to his home blood pressure log. Bleeding Risks: Mr. Felix Spicer denies any current or recent bleeding problems including a history of a GI bleed, ulcers, recent or upcoming surgeries, blood in his stool or black tarry stools or blood in his urine. Pacer check today in office 11/8/2021-normal pacemaker function. Battery is around 4 years. Chronic atrial fibrillation with controlled ventricular response. Past Medical History:     Past Medical History:   Diagnosis Date    Abnormal ultrasound of lower extremity 7/22/15    Moderate to severe bilateral,multifocal,arterial insufficiency in  both legs. Minimal inflow component.   Claudication present during exercise componet with [place]    PACEMAKER INSERTION      PROSTATECTOMY      TONSILLECTOMY AND ADENOIDECTOMY       Social History:    Social History     Tobacco Use   Smoking Status Former Smoker    Packs/day: 1.00    Years: 50.00    Pack years: 50.00    Types: Cigarettes, Pipe    Quit date: 2006    Years since quitting: 15.8   Smokeless Tobacco Never Used     Family history reviewed, noncontributory. Patient with known history of CAD, CHF, A. fib and pacemaker. Current Medications:  Outpatient Medications Marked as Taking for the 11/8/21 encounter (Office Visit) with Patt Hammer MD   Medication Sig Dispense Refill    spironolactone (ALDACTONE) 25 MG tablet Take 1 tablet by mouth daily 90 tablet 3    torsemide (DEMADEX) 20 MG tablet Take 20 mg on mon wed and fri Take one half tablet on sun, tues,thurs and sat 30 tablet 5    metoprolol succinate (TOPROL XL) 100 MG extended release tablet TAKE ONE TABLET BY MOUTH TWICE A  tablet 3    NONFORMULARY 1 capsule daily Pt takes Balance of Nutrition daily as 3 of Vegetables Capsules and 3 of the fruit capsules.  senna (SENOKOT) 8.6 MG tablet as needed      ELIQUIS 2.5 MG TABS tablet TAKE ONE TABLET BY MOUTH TWICE A  tablet 3    umeclidinium-vilanterol (ANORO ELLIPTA) 62.5-25 MCG/INH AEPB inhaler Inhale 1 puff into the lungs daily 3 each 3    albuterol sulfate  (90 Base) MCG/ACT inhaler Inhale 2 puffs into the lungs 4 times daily as needed for Wheezing 1 Inhaler 0    glimepiride (AMARYL) 2 MG tablet Take 2 mg by mouth daily       Coenzyme Q10 (CO Q-10) 400 MG CAPS Take 100 mg by mouth 2 times daily       simvastatin (ZOCOR) 20 MG tablet Take 20 mg by mouth nightly       Multiple Vitamins-Minerals (CENTRUM SILVER) TABS Take 1 tablet by mouth daily          Review of Systems   Constitutional: Negative for chills, diaphoresis, fever, malaise/fatigue and weight loss. HENT: Negative for ear pain, hearing loss, nosebleeds and tinnitus.     Eyes: Negative for blurred vision, double vision, photophobia, pain and discharge. Respiratory: Positive for shortness of breath. Negative for cough, hemoptysis and sputum production. Breathing is better. Shortness of breath on exertion. Cardiovascular: Negative for chest pain, palpitations, orthopnea, claudication, leg swelling and PND. Gastrointestinal: Negative for abdominal pain, constipation, diarrhea, heartburn, nausea and vomiting. Genitourinary: Negative. Musculoskeletal: Positive for back pain and joint pain. Negative for falls, myalgias and neck pain (.pt). Skin: Negative for itching and rash. Neurological: Negative for dizziness, speech change, focal weakness, loss of consciousness, weakness and headaches. Endo/Heme/Allergies: Negative for environmental allergies and polydipsia. Does not bruise/bleed easily. Psychiatric/Behavioral: Negative. Objective:     PHYSICAL EXAM:      VITALS:    There were no vitals taken for this visit. CONSTITUTIONAL: Cooperative, no apparent distress, and appears well nourished / developed. NEUROLOGIC:  Awake and orientated to person, place and time. PSYCH: Calm affect. SKIN: Warm and dry. HEENT:  normocephalic, neck supple, no elevation of JVP, normal carotid pulses with no bruits and thyroid normal size. LUNGS: Mild decreased air entry at the left lung base. Cardiovascular: Normal rate, irregularly irregular rhythm, normal heart sounds. Exam reveals no gallop and no friction rubs. A IV/VI systolic was heard maximally at the 2nd right intercostal space. ABDOMEN:  Normal bowel sounds, non-distended and non-tender to palpation. + Colostomy  Extremities: No edema. No cyanosis and no clubbing. Pulses are 2+ radial and carotid pulses. 2+ dorsalis pedis and posterior tibial pulses bilaterally. Bilateral leg tenderness.       DATA:    Lab Results   Component Value Date    CREATININE 1.47 (H) 06/22/2021    BUN 31 (H) 06/22/2021     06/22/2021 K 5.0 06/22/2021     06/22/2021    CO2 25 06/22/2021       Assessment:      Diagnosis Orders   1. Chronic a-fib (HCC)  EKG 12 lead    Pacer fantasma dual chamber with reprog   2. ASHD (arteriosclerotic heart disease)     3. S/P CABG (coronary artery bypass graft)     4. Chronic diastolic congestive heart failure ([address]     5. Essential hypertension     6. Mixed hyperlipidemia     7. S/P placement of cardiac pacemaker  EKG 12 lead    Pacer fantasma dual chamber with reprog   8. Tachy-wendi syndrome (HCC)       Plan:     Chronic Atrial Fibrillation: Rate Control S/P pacemaker placement on 11/07/2016  Beta Blocker: Continue metoprolol succinate (Toprol XL) 100 mg twice a day. Stroke Risk: His CHADS2-VASc score is greater than 2 (2.2% stroke risk)  · Anticoagulation: Continue Apixaban (Eliquis) 2.5 mg every 12 hours. Dose appropriate, age about [de-identified] and creatinine 1.5 mg/dL. Atherosclerotic Heart Disease: History of CABG in 1995   Antiplatelet Agent: Not indicated at this time. Diuretics: Continue torsemide (Demodex) as directed by nephrology     Diuretics: Continue Spironolactone (Aldactone) 25 mg daily. Beta Blocker: Continue metoprolol succinate (Toprol XL) 100 mg twice a day. Cholesterol Reduction Therapy: Continue simvastatin (Zocor) 20 mg daily. Chronic Diastolic Heart Failure: EF 55% on 4/2018  Beta Blocker: Continue metoprolol succinate (Toprol XL) 100 mg twice a day. Diuretics: Continue torsemide (Demodex) as directed by nephrology     Diuretics: Continue Spironolactone (Aldactone) 25 mg daily. History of chronic kidney disease, single kidney, follow-up with nephrology as previously scheduled.   Essential Hypertension: Borderline controlled  · Beta Blocker: Continue metoprolol succinate (Toprol XL) 100 mg twice a day. Diuretics: Continue torsemide (Demodex) as directed by nephrology     Diuretics: Continue Spironolactone (Aldactone) 25 mg daily.     · I asked him to continue monitor blood pressure at home and call me back with a blood pressure log in the next week. · Discussed with him the need for better control of the blood pressure particularly with his recent diagnosis of cerebral aneurysm. I told him target blood pressure for him should be between [phone] mmHg systolic. I may consider adding hydralazine or changing his Toprol-XL to carvedilol for better control of the blood pressure if needed. · Hyperlipidemia: Mixed  · Cholesterol Reduction Therapy: Continue simvastatin (Zocor) 20 mg daily. I discussed the potential benefits of statin therapy as well as the potential risks including myalgia as well as the rare but potentially serious complication of liver or kidney damage. Although rare, I told them that this could be serious and therefore told them to stop the medication immediately and call if they developed any severe muscle aches or pains and they agreed to do so.  Dual Chamber Pacemaker:   Indication for Device Placement: Tachycardia-Bradycardia Syndrome   Interrogation Findings: Normal pacemaker function. Chronic atrial fibrillation. Battery is 4 years. No high ventricular rate episodes. Finally, I recommended that he continue his other medications and follow up with you as previously scheduled. FOLLOW UP:  I told Mr. Rangel to call my office if he had any problems, but otherwise told him to Return in about 6 months (around 5/8/2022). However, I would be happy to see him sooner should the need arise. Sincerely,  Marie Iverson MD, F.A.C.C. [facility] Cardiology Specialist    [address] [place] [address]  Phone: [phone], Fax: [phone]     I believe that the risk of significant morbidity and mortality related to the patient's current medical conditions are: intermediate-high.  >30 minutes were spent during prep work, discussion and exam of the patient, and follow up documentation and all of their questions were answered. The documentation recorded by the scribe, accurately and completely reflects the services I personally performed and the decisions made by me. Candido Nicole MD, F.A.C.C.  November 8, 2021

## 2021-11-15 ENCOUNTER — TELEPHONE (OUTPATIENT)
Dept: CARDIOLOGY | Age: 86
End: 2021-11-15

## 2021-11-15 NOTE — TELEPHONE ENCOUNTER
Patient stopped in with list of blood pressure readings. Per Dr Duane العليew, they look good, no medication changes needed. Patient aware, scanned in readings.

## 2021-11-29 DIAGNOSIS — I50.32 CHRONIC DIASTOLIC CHF (CONGESTIVE HEART FAILURE) (HCC): Chronic | ICD-10-CM

## 2021-11-29 DIAGNOSIS — I25.119 CORONARY ARTERY DISEASE INVOLVING NATIVE CORONARY ARTERY OF NATIVE HEART WITH ANGINA PECTORIS (HCC): Chronic | ICD-10-CM

## 2021-11-29 DIAGNOSIS — I48.21 PERMANENT ATRIAL FIBRILLATION (HCC): Chronic | ICD-10-CM

## 2021-11-29 DIAGNOSIS — I49.5 TACHYCARDIA-BRADYCARDIA SYNDROME (HCC): ICD-10-CM

## 2021-11-29 DIAGNOSIS — J44.9 STAGE 1 MILD COPD BY GOLD CLASSIFICATION (HCC): ICD-10-CM

## 2021-11-29 DIAGNOSIS — E78.2 MIXED HYPERLIPIDEMIA: ICD-10-CM

## 2021-11-29 DIAGNOSIS — Z95.0 PACEMAKER: Chronic | ICD-10-CM

## 2021-11-29 RX ORDER — UMECLIDINIUM BROMIDE AND VILANTEROL TRIFENATATE 62.5; 25 UG/1; UG/1
1 POWDER RESPIRATORY (INHALATION) DAILY
Qty: 3 EACH | Refills: 3 | Status: SHIPPED | OUTPATIENT
Start: 2021-11-29 | End: 2022-01-13 | Stop reason: SDUPTHER

## 2021-11-29 RX ORDER — APIXABAN 2.5 MG/1
TABLET, FILM COATED ORAL
Qty: 180 TABLET | Refills: 3 | Status: SHIPPED | OUTPATIENT
Start: 2021-11-29

## 2021-11-29 NOTE — TELEPHONE ENCOUNTER
Remedios Faisal called in and said that he needs a refill on his Anoro. I explained that it has been over 2 years since he has been seen in the office, he needs an appointment. He said he is not comfortable coming into the office with Covid being so prevalent. I told him we could do a telephone or virtual visit. He agreed to a telephone visit. Appointment given for Jan. 13,21 at 8:45. Can you please refill the Anoro.

## 2021-12-22 NOTE — PROGRESS NOTES
2022    TELEHEALTH EVALUATION -- Audio/Visual (During TWDNZ-25 public health emergency)    Patient and physician are located in their individual locations. This is visit is completed via [employer] application []/ Doxy. me[] / Telephone [x]     HPI:    Sha Oakes (:  [id number]) has requested an audio/video evaluation for the following concern(s):    Patient is here for follow-up for stage I COPD and history of bilateral pleural effusions. Patient was last seen in the office in 2019 per Dr. Roberta Lucero. Patient following at [facility] for work-up regarding a frontal bone lesion, etiology is unclear and per neuro notes may represent dermoid, hemangioma versus malignant lesion with plan for MRI evaluation. 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 PET scan imaging on 2021 was negative for FDG avidity. The only exception to that study was that he had a left common iliac lymph node-felt to be a thrombosed native common iliac vessel rather than necrotic lymph node which appears on a previous remote study in . PET scan imaging of his brain did notice an increased FDG uptake in the left frontal skull with an SUV max of 4.9 corresponding to a lytic lesion on CT imaging consistent with metastases. This has enlarged in size since CT imaging in 2016. Remainder of the brain was negative. Breathing is ok- he is on Anoro and albuterol- Does not use the albuterol. Denies SOB or cough- but notes exertional dyspnea after climbing 3 flights of stairs. Medications:   Anoro  Albuterol HFA  Albuterol neb    Prior Work Up:  CT Imaging/CXR:   CT chest 2019: Heavy coronary calcifications. Heavy vascular calcification within thoracic aorta mostly arch and descending segments without aneurysm. Small bilateral pleural effusions with basilar compressive atelectasis with some groundglass opacities left lower lobe. Scattered calcified granulomas throughout.   Mild centrilobular emphysema upper zones. Additional mild atelectatic/fibrotic changes in the lingula. PFT:   PFT 4/26/2018: FEV1 85% of predicted with an 11% bronchodilator change to 95% of predicted. FVC is 93% of predicted with a 4% bronchodilator change to 96% of predicted. FEV1/FVC ratio is 63. FEF 8616 was 61% of predicted. Lung volumes by body box show mild air trapping. TLC is 96% of predicted diffusion capacity is 46% of predicted. Final impression: Mild obstructive ventilatory dysfunction with bronchodilator response that does not meet ATS criteria. Sleep:       Laboratory/Other:         Review of Systems   Constitutional: Negative. HENT: Negative. Eyes: Negative. Respiratory: Negative. Cardiovascular: Negative. Gastrointestinal: Negative. Endocrine: Negative. Genitourinary: Negative. Musculoskeletal: Negative. Skin: Negative. Allergic/Immunologic: Negative. Neurological: Negative. Hematological: Negative. Psychiatric/Behavioral: Negative. Prior to Visit Medications    Medication Sig Taking? Authorizing Provider   umeclidinium-vilanterol (ANORO ELLIPTA) 62.5-25 MCG/INH AEPB inhaler Inhale 1 puff into the lungs daily Yes Doug Shankar,    ELIQUIS 2.5 MG TABS tablet TAKE ONE TABLET BY MOUTH TWICE A DAY Yes Solomon Avery MD   spironolactone (ALDACTONE) 25 MG tablet Take 1 tablet by mouth daily Yes Ángela Slater MD   torsemide (DEMADEX) 20 MG tablet Take 20 mg on mon wed and fri Take one half tablet on sun, tues,thurs and sat Yes Ángela Slater MD   NONFORMULARY 1 capsule daily Pt takes Balance of Nutrition daily as 3 of Vegetables Capsules and 3 of the fruit capsules.  Yes Historical Provider, MD   senna (SENOKOT) 8.6 MG tablet as needed Yes Historical Provider, MD   glimepiride (AMARYL) 2 MG tablet Take 2 mg by mouth daily  Yes Historical Provider, MD   Coenzyme Q10 (CO Q-10) 400 MG CAPS Take 100 mg by mouth 2 times daily  Yes Historical Provider, MD   simvastatin (ZOCOR) 20 MG tablet Take 20 mg by mouth nightly  Yes Historical Provider, MD   Multiple Vitamins-Minerals (CENTRUM SILVER) TABS Take 1 tablet by mouth daily  Yes Historical Provider, MD   metoprolol succinate (TOPROL XL) 100 MG extended release tablet TAKE ONE TABLET BY MOUTH TWICE A DAY  Patient not taking: Reported on 2022  Lobito Woodruff MD   allopurinol (ZYLOPRIM) 100 MG tablet Take 1 tablet by mouth daily  Patient not taking: Reported on 2021  Andria Temple MD   albuterol sulfate  (90 Base) MCG/ACT inhaler Inhale 2 puffs into the lungs 4 times daily as needed for Wheezing  Patient not taking: Reported on 2022  Daily Parra II, PA-C   albuterol (PROVENTIL) (2.5 MG/3ML) 0.083% nebulizer solution Take 3 mLs by nebulization every 6 hours as needed for Wheezing or Shortness of Breath  Patient not taking: Reported on 2021  Sudha Dillon II, PA-C       Social History     Tobacco Use    Smoking status: Former Smoker     Packs/day: 1.00     Years: 50.00     Pack years: 50.00     Types: Cigarettes, Pipe     Quit date:      Years since quittin.0    Smokeless tobacco: Never Used   Vaping Use    Vaping Use: Never used   Substance Use Topics    Alcohol use: No     Alcohol/week: 0.0 standard drinks    Drug use: No              RECORD REVIEW: Previous medical records were reviewed at today's visit.     Wt Readings from Last 3 Encounters:   21 197 lb 9.6 oz (89.6 kg)   21 193 lb 3.2 oz (87.6 kg)   21 194 lb 3.2 oz (88.1 kg)       Results for orders placed or performed during the hospital encounter of 21   PTH, Intact   Result Value Ref Range    Pth Intact 82.97 (H) 15.0 - 65.0 pg/mL   Phosphorus   Result Value Ref Range    Phosphorus 2.7 2.5 - 4.5 mg/dL   Basic Metabolic Panel   Result Value Ref Range    Glucose 201 (H) 70 - 99 mg/dL    BUN 31 (H) 8 - 23 mg/dL    CREATININE 1.47 (H) 0.70 - 1.20 mg/dL    Bun/Cre Ratio 21 (H) 9 - 20 Calcium 9.5 8.6 - 10.4 mg/dL    Sodium 137 135 - 144 mmol/L    Potassium 5.0 3.7 - 5.3 mmol/L    Chloride 103 98 - 107 mmol/L    CO2 25 20 - 31 mmol/L    Anion Gap 9 9 - 17 mmol/L    GFR Non-African American 45 (L) >60 mL/min    GFR  55 (L) >60 mL/min    GFR Comment          GFR Staging         Albumin   Result Value Ref Range    Albumin 4.1 3.5 - 5.2 g/dL   CBC   Result Value Ref Range    WBC 6.6 3.5 - 11.3 k/uL    RBC 5.35 4.21 - 5.77 m/uL    Hemoglobin 15.6 13.0 - 17.0 g/dL    Hematocrit 49.7 40.7 - 50.3 %    MCV 92.9 82.6 - 102.9 fL    MCH 29.2 25.2 - 33.5 pg    MCHC 31.4 28.4 - 34.8 g/dL    RDW 13.1 11.8 - 14.4 %    Platelets 734 143 - 144 k/uL    MPV 9.8 8.1 - 13.5 fL    NRBC Automated 0.0 0.0 per 100 WBC   Protein / creatinine ratio, urine   Result Value Ref Range    Total Protein, Urine 37 mg/dL    Creatinine, Ur 70.3 39.0 - 259.0 mg/dL    Urine Total Protein Creatinine Ratio 0.53 (H) 0.00 - 0.20       No results found. PHYSICAL EXAMINATION:  Due to this being a TeleHealth encounter, evaluation of the following organ systems is limited: Vitals/Constitutional/EENT/Resp/CV/GI//MS/Neuro/Skin/Heme-Lymph-Imm. Telephone visit- no physical exam.         ASSESSMENT:  1. Stage 1 mild COPD by GOLD classification ([facility] [address]    2. Frontal skull lesion          Plan:   1. Medications reviewed. 2. Educated and clarified the medication use. 3. Maintain an active lifestyle. 4. Patient's questions were answered to his satisfaction. 5. Pulmonary function tests were reviewed  6. CT scan of the chest was reviewed  7. We'll see the patient back in 12 months         An  electronic signature was used to authenticate this note.     --Reinier Caal, APRN - CNP on 1/13/2022 at 9:14 AM    9}    Pursuant to the emergency declaration under the [address] [facility] authority and the [employer] Act, this Virtual  Visit was conducted, with patient's consent, to reduce the patient's risk of exposure to COVID-19 and provide continuity of care for an established patient. Services were provided through a video synchronous discussion virtually to substitute for in-person clinic visit.     _______________________________________________________________________________________________________________________________________________  FOR TELEPHONE VISITS PLEASE COMPLETE THE FOLLOWING    Consent:  He and/or health care decision maker is aware that that he may receive a bill for this telephone service, depending on his insurance coverage, and has provided verbal consent to proceed: Yes      I affirm this is a Patient Initiated Episode with an Established Patient who has not had a related appointment within my department in the past 7 days or scheduled within the next 24 hours.     Total Time: minutes: 5-10 minutes    Note: not billable if this call serves to triage the patient into an appointment for the relevant concern

## 2022-01-13 ENCOUNTER — VIRTUAL VISIT (OUTPATIENT)
Dept: PULMONOLOGY | Age: 87
End: 2022-01-13
Payer: MEDICARE

## 2022-01-13 DIAGNOSIS — J44.9 STAGE 1 MILD COPD BY GOLD CLASSIFICATION (HCC): Primary | ICD-10-CM

## 2022-01-13 DIAGNOSIS — M89.9 FRONTAL SKULL LESION: ICD-10-CM

## 2022-01-13 PROCEDURE — G2012 BRIEF CHECK IN BY MD/QHP: HCPCS | Performed by: NURSE PRACTITIONER

## 2022-01-13 RX ORDER — UMECLIDINIUM BROMIDE AND VILANTEROL TRIFENATATE 62.5; 25 UG/1; UG/1
1 POWDER RESPIRATORY (INHALATION) DAILY
Qty: 3 EACH | Refills: 3 | Status: SHIPPED | OUTPATIENT
Start: 2022-01-13

## 2022-01-13 ASSESSMENT — ENCOUNTER SYMPTOMS
RESPIRATORY NEGATIVE: 1
GASTROINTESTINAL NEGATIVE: 1
ALLERGIC/IMMUNOLOGIC NEGATIVE: 1
EYES NEGATIVE: 1

## 2022-01-26 ENCOUNTER — OFFICE VISIT (OUTPATIENT)
Dept: CARDIOLOGY | Age: 87
End: 2022-01-26
Payer: MEDICARE

## 2022-01-26 VITALS
HEART RATE: 81 BPM | SYSTOLIC BLOOD PRESSURE: 143 MMHG | DIASTOLIC BLOOD PRESSURE: 84 MMHG | OXYGEN SATURATION: 98 % | WEIGHT: 199 LBS | RESPIRATION RATE: 18 BRPM | BODY MASS INDEX: 31.23 KG/M2 | HEIGHT: 67 IN

## 2022-01-26 DIAGNOSIS — I49.5 TACHY-BRADY SYNDROME (HCC): ICD-10-CM

## 2022-01-26 DIAGNOSIS — I25.119 CORONARY ARTERY DISEASE INVOLVING NATIVE CORONARY ARTERY OF NATIVE HEART WITH ANGINA PECTORIS (HCC): ICD-10-CM

## 2022-01-26 DIAGNOSIS — Z95.1 S/P CABG (CORONARY ARTERY BYPASS GRAFT): ICD-10-CM

## 2022-01-26 DIAGNOSIS — Z01.818 PRE-OPERATIVE CLEARANCE: Primary | ICD-10-CM

## 2022-01-26 DIAGNOSIS — Z95.0 PACEMAKER: ICD-10-CM

## 2022-01-26 DIAGNOSIS — I50.32 CHRONIC DIASTOLIC CONGESTIVE HEART FAILURE (HCC): ICD-10-CM

## 2022-01-26 DIAGNOSIS — I48.20 CHRONIC A-FIB (HCC): ICD-10-CM

## 2022-01-26 DIAGNOSIS — E78.2 MIXED HYPERLIPIDEMIA: ICD-10-CM

## 2022-01-26 DIAGNOSIS — I10 ESSENTIAL HYPERTENSION: ICD-10-CM

## 2022-01-26 PROCEDURE — 99214 OFFICE O/P EST MOD 30 MIN: CPT | Performed by: INTERNAL MEDICINE

## 2022-01-26 PROCEDURE — 93010 ELECTROCARDIOGRAM REPORT: CPT | Performed by: INTERNAL MEDICINE

## 2022-01-26 PROCEDURE — 4040F PNEUMOC VAC/ADMIN/RCVD: CPT | Performed by: INTERNAL MEDICINE

## 2022-01-26 PROCEDURE — 1123F ACP DISCUSS/DSCN MKR DOCD: CPT | Performed by: INTERNAL MEDICINE

## 2022-01-26 PROCEDURE — 93005 ELECTROCARDIOGRAM TRACING: CPT | Performed by: INTERNAL MEDICINE

## 2022-01-26 PROCEDURE — 99211 OFF/OP EST MAY X REQ PHY/QHP: CPT | Performed by: INTERNAL MEDICINE

## 2022-01-26 PROCEDURE — 1036F TOBACCO NON-USER: CPT | Performed by: INTERNAL MEDICINE

## 2022-01-26 PROCEDURE — G8417 CALC BMI ABV UP PARAM F/U: HCPCS | Performed by: INTERNAL MEDICINE

## 2022-01-26 PROCEDURE — G8484 FLU IMMUNIZE NO ADMIN: HCPCS | Performed by: INTERNAL MEDICINE

## 2022-01-26 PROCEDURE — G8427 DOCREV CUR MEDS BY ELIG CLIN: HCPCS | Performed by: INTERNAL MEDICINE

## 2022-01-26 ASSESSMENT — ENCOUNTER SYMPTOMS
HEMOPTYSIS: 0
HEARTBURN: 0
NAUSEA: 0
ORTHOPNEA: 0
VOMITING: 0
EYE PAIN: 0
PHOTOPHOBIA: 0
ABDOMINAL PAIN: 0
COUGH: 0
CONSTIPATION: 0
BACK PAIN: 1
EYE DISCHARGE: 0
DOUBLE VISION: 0
DIARRHEA: 0
SPUTUM PRODUCTION: 0
BLURRED VISION: 0
SHORTNESS OF BREATH: 1

## 2022-01-26 NOTE — PROGRESS NOTES
Rhina Watson am scribing for and in the presence of Thais Hendricks MD, F.A.C.C..    Subjective:     CHIEF COMPLAINT / HPI:   Chief Complaint   Patient presents with    Follow-up     HX:Chronic AFib, ASHD s/p CABG, CHF, Pacer, HLD, HTN  Pt states he is doing well. 2/11/2022 he has surgery done Denies: Cp, Palpitaiotns, lighteaded/dizzienss, SOB. Dear Dr. Daron Madden MD    I had the pleasure of seeing Mr. Rangel for follow-up today. 1-Danny Rangel is 80 y.o. male with extensive cardiac history that include history of myocardial infarction status post bypass surgery in 1995, long-standing history of atrial fibrillation and history of abdominal aortic aneurysm status post repair. 2-Patient has long-standing history of atrial fibrillation that started after his bypass surgery, has been tried on multiple antiarrhythmic medications including sotalol which failed to control his atrial fibrillation, dofetilide 125 mg which has been discontinued at the time of his nephrectomy. He also has been tried on amiodarone but could not tolerate it because of side effect (lung toxicity). 3-Patient was reloaded with Tikosyn, which controlled his atrial fibrillation only partially. Patient was sent to [facility] for ablation but because of his age and his ling history of A Fib, they thought the success rate will not be that high. Decision is made to go for rate control and pacemaker placement. Patient underwent the procedure on 11/07/2016. 4-He was admitted for a low hemoglobin count (5/1/2018- 7.4) anemia secondary to acute blood loss from GI bleeding. His Xarelto is on hold. 5-Echocardiogram: 4/19/2018: EF 55% with moderate mitral regurgitation. 6-Cardiovascular stress test: 4/25/2018: Normal myocardial perfusion. 7-An admission to [facility] on 6/17/2018 because of large left-sided pleural effusion and acute on chronic diastolic CHF.   Status post thoracentesis. 8- EKG done in office on 11/23/2020. 9-Pacemaker checked in office on 5/24/2021: Normal pacemaker function. Chronic atrial fibrillation. Ventricular rate is controlled. Expected battery is 4.5 years. 11-Pacer check in office 11/8/2021-normal pacemaker function. Battery is around 4 years. Chronic atrial fibrillation with controlled ventricular response. 12-He is being evaluated at [facility] for cerebral aneurysm. The plan was to do cerebral MRA and MRI but this was not possible because of his non-MRI compatible pacemaker. Mr. Kia Perkins is here for pre op clearance. He is planning on having the bump removed from his forehead (Craniectomy with excision of bone lesion of the skull +/-skin grafting over the forehead). He will be doing this a [facility] on February 11th 2022. His weight is stable at this time. He celebrated his 89th birthday this past December and he enjoyed a cupcake on that day. His breathing stable at this time. No increased shortness of breath. He is walking about a mile to a mile and half on his treadmill. He does this about 5-6 days a week. He denied any chest pain, pressure or tightness. No significant heart palpitations or lightheaded/ dizziness. No stomach pain, nausea or vomiting. No issues with his ileostomy at this time and he does not ever see any blood in it. No fever or cough. No orthopnea or PND. I reviewed his blood work, kidney function stable. I reviewed his blood work and pacemaker check from [facility] that was done this past Monday. Everything looks stable at this time. Past Medical History:     Past Medical History:   Diagnosis Date    Abnormal ultrasound of lower extremity 7/22/15    Moderate to severe bilateral,multifocal,arterial insufficiency in  both legs. Minimal inflow component. Claudication present during exercise componet with worsenind NIVIA on the left post-exercise.     Atrial fibrillation ([address]     s/p pacemaker placement due to failure of multiple rhythm control strategies    Bladder cancer ([facility] [address]     CAD (coronary artery disease)     Dyspnea on exertion 5/7/2019    H/O echocardiogram 04/19/2018    EF 55%. LV cavity size is within normal limits,LV wall thickness is mildly increased. No definite specific wall motion abnormalities were identified. Mitral annular calcification. Myxomatous thickening of the mitral leaflets. Moderate mitral regurg. rhythm of what appeared to be atrial fib.  H/O total cystectomy 2/5/2019    For bladder CA in 2006, has an ileal conduit    History of abdominal aortic aneurysm (AAA) 2/5/2019    S/p repair 1993     History of bleeding ulcers     duodenal    History of Holter monitoring 9/21/15    atrial fibrillation/flutter throughout with an average HR of 67 bpm. 78 pauses >2 seconds and 1 pause >3 seconds,but all occured during typical hours of sleep.  History of Holter monitoring 5/27/16    Paroxysmal atrial fib with rapid ventricular response (A-Fib 38% of the time). Occasional isolated PVC's    History of Holter monitoring 08/09/2016    Atrial Fibrillation throughout with heart rates ranging between  bpm.  Four 2 second pauses. Occasional PVC's    History of stress test 04/25/2018    Normal. LV systolic function cannot be assessed because of no gating. ECG is uninterpretable at baseline because of demand ventricular pacing. Low risk for significant CAD.     Hypertension     Pacemaker 11/07/2016    [employer]     Polymyalgia rheumatica [facility])      Past Surgical History:  Past Surgical History:   Procedure Laterality Date    ABDOMINAL AORTIC ANEURYSM [address] APPENDECTOMY      BLADDER SURGERY      CATARACT REMOVAL Bilateral     CHOLECYSTECTOMY      CORONARY ARTERY BYPASS GRAFT  1995    x[facility]    KIDNEY [facility]    PACEMAKER INSERTION      PROSTATECTOMY      TONSILLECTOMY AND Shortness of breath on exertion. Cardiovascular: Negative for chest pain, palpitations, orthopnea, claudication, leg swelling and PND. Gastrointestinal: Negative for abdominal pain, constipation, diarrhea, heartburn, nausea and vomiting. Genitourinary: Negative. Musculoskeletal: Positive for back pain and joint pain. Negative for falls, myalgias and neck pain (.pt). Skin: Negative for itching and rash. Neurological: Negative for dizziness, speech change, focal weakness, loss of consciousness, weakness and headaches. Endo/Heme/Allergies: Negative for environmental allergies and polydipsia. Does not bruise/bleed easily. Psychiatric/Behavioral: Negative. Objective:     PHYSICAL EXAM:      VITALS:    BP (!) 143/84 (Site: Right Upper Arm, Position: Sitting, Cuff Size: Medium Adult)   Pulse 81   Resp 18   Ht 5' 7\" (1.702 m)   Wt 199 lb (90.3 kg)   SpO2 98%   BMI 31.17 kg/m²   CONSTITUTIONAL: Cooperative, no apparent distress, and appears well nourished / developed. NEUROLOGIC:  Awake and orientated to person, place and time. PSYCH: Calm affect. SKIN: Warm and dry. HEENT:  normocephalic, neck supple, no elevation of JVP, normal carotid pulses with no bruits and thyroid normal size. LUNGS: Mild decreased air entry at the left lung base. Cardiovascular: Normal rate, irregularly irregular rhythm, normal heart sounds. Exam reveals no gallop and no friction rubs. A IV/VI systolic was heard maximally at the 2nd right intercostal space. ABDOMEN:  Normal bowel sounds, non-distended and non-tender to palpation. + urostomy  Extremities: No edema. No cyanosis and no clubbing. Pulses are 2+ radial and carotid pulses. 2+ dorsalis pedis and posterior tibial pulses bilaterally. Bilateral leg tenderness.       DATA:    Lab Results   Component Value Date    CREATININE 1.47 (H) 06/22/2021    BUN 31 (H) 06/22/2021     06/22/2021    K 5.0 06/22/2021     06/22/2021    CO2 25 06/22/2021 Assessment:      Diagnosis Orders   1. Pre-operative clearance     2. Chronic a-fib (HCC)  EKG 12 lead   3. Coronary artery disease involving native coronary artery of native heart with angina pectoris ([address]     4. S/P CABG (coronary artery bypass graft)     5. Chronic diastolic congestive heart failure (HCC)  EKG 12 lead   6. Essential hypertension     7. Mixed hyperlipidemia     8. Pacemaker     9. 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 It is a robotic surgery. We also went through the Pre-Op Clearance from a heart standpoint. I answered all of his questions that he had at this time. We also discussed his results of his lab work from [facility]. His symptoms are stable at this time. His functional capacity is great. · Pre-Operative Risk assessment using 2014 ACC/AHA guidelines   · Emergent procedure No  · Active Cardiac Condition No (decompensated HF, Arrhythmia, MI <3 weeks, severe valve disease)  · Risk Level of Procedure Intermediate Risk (intraperitoneal, intrathoracic, HENT, orthopedic, or carotid endarterectomy, etc.)  · Revised Cardiac Risk Index Risk factors: History of ischemic heart disease  · History of heart failure  · Measurement of Exercise Tolerance before Surgery >4 Yes  · According to the 2014 ACC/AHA pre-operative risk assessment guidelines Cj Starkey is at intermediate risk for major cardiac complications during a intermediate risk procedure and may continue as planned. Specific medication recommendations are listed below. Medications recommended to continue should be taken with a sip of water even when NPO.  Medical management to reduce perioperative risk:   Anticoagulant Agent: I would suggest holding his Apixaban (Eliquis) for 3-5 days prior to the procedure.  We discussed the reason why we are going to hold his Eliquis for that long because we cannot accept any chance of intracranial bleeding although he said the internal layer of the bone is intact. I told him it is safe to hold Eliquis for that long and he may also need skin grafting of the forehead. In addition, he has a single kidney and his creatinine is 1.53 mg/dL.  I did explain to him that based on how the surgery will go, they can restart Eliquis next day after the surgery.  No bridging is needed. I did explain to him that bridging is only indicated if his chads vascular score is more than 5.   Additional Recommendations: I would also suggest that he continue his beta blocker and statin throughout the perioperative period. Chronic Atrial Fibrillation: Rate Control S/P pacemaker placement on 11/07/2016  Beta Blocker: Continue metoprolol succinate (Toprol XL) 100 mg twice a day. [id number] Score for Atrial Fibrillation Stroke Risk   Risk   Factors  Component Value   C CHF Yes 1   H HTN Yes 1   A2 Age >= 76 Yes,  (80 y.o.) 2   D DM Yes 1   S2 Prior Stroke/TIA No 0   V Vascular Disease No 0   A Age 74-69 No,  (80 y.o.) 0   Sc Sex male 0    [id number]  Score  5   Score last updated 9/91/98 4:39 PM EST  Click here for a link to the UpToDate guideline \"Atrial Fibrillation: Anticoagulation therapy to prevent embolization  Disclaimer: Risk Score calculation is dependent on accuracy of patient problem list and past encounter diagnosis. Stroke Risk: His CHADS2-VASc score is 5/9 (6.7% stroke risk)  · Anticoagulation: Continue Apixaban (Eliquis) 2.5 mg every 12 hours. Dose appropriate, age 80 and creatinine 1.5 mg/dL. Atherosclerotic Heart Disease: History of CABG in 1995   Antiplatelet Agent: Not indicated at this time. He is currently on Eliquis as above. Diuretics: Continue torsemide (Demodex) as directed by nephrology     Diuretics: Continue Spironolactone (Aldactone) 25 mg daily. Beta Blocker: Continue metoprolol succinate (Toprol XL) 100 mg twice a day.    Cholesterol Reduction Therapy: Continue simvastatin (Zocor) 20 mg daily. Chronic Diastolic Heart Failure: EF 55% on 4/2018  Beta Blocker: Continue metoprolol succinate (Toprol XL) 100 mg twice a day. Diuretics: Continue torsemide (Demodex) as directed by nephrology     Diuretics: Continue Spironolactone (Aldactone) 25 mg daily. History of chronic kidney disease, single kidney, follow-up with nephrology as previously scheduled.   Essential Hypertension: Borderline controlled  · Beta Blocker: Continue metoprolol succinate (Toprol XL) 100 mg twice a day. Diuretics: Continue torsemide (Demodex) as directed by nephrology     Diuretics: Continue Spironolactone (Aldactone) 25 mg daily. · Hyperlipidemia: Mixed  · Cholesterol Reduction Therapy: Continue simvastatin (Zocor) 20 mg daily.  Dual Chamber Pacemaker:   Indication for Device Placement: Tachycardia-Bradycardia Syndrome    Status post AV node ablation. Currently paced at 80 bpm.    · Diabetes: Continue follow up with Dr. Kristen Pierre MD , his last Hemoglobin was done on 1/24/2022 and it was 7.6%. Finally, I recommended that he continue his other medications and follow up with you as previously scheduled. FOLLOW UP:  I told Mr. Rangel to call my office if he had any problems, but otherwise told him to Return in about 6 months (around 7/26/2022). However, I would be happy to see him sooner should the need arise. Sincerely,  Pedro Pablo Kuhn MD, F.A.C.C. [facility] Cardiology Specialist    [address]  Phone: [phone], Fax: [phone]     I believe that the risk of significant morbidity and mortality related to the patient's current medical conditions are: intermediate-high. >30 minutes were spent during prep work, discussion and exam of the patient, and follow up documentation and all of their questions were answered.     The documentation recorded by the scribe, accurately and completely reflects the services I personally performed and the decisions made by me. Jean Seymour MD, F.A.C.C.  January 26, 2022

## 2022-01-26 NOTE — PATIENT INSTRUCTIONS
SURVEY:    You may be receiving a survey from [employer] regarding your visit today. Please complete the survey to enable us to provide the highest quality of care to you and your family. If you cannot score us a very good on any question, please call the office to discuss how we could have made your experience a very good one. Thank you.

## 2022-02-28 RX ORDER — METOPROLOL SUCCINATE 100 MG/1
TABLET, EXTENDED RELEASE ORAL
Qty: 180 TABLET | Refills: 3 | Status: SHIPPED | OUTPATIENT
Start: 2022-02-28

## 2022-05-10 ENCOUNTER — NURSE ONLY (OUTPATIENT)
Dept: CARDIOLOGY | Age: 87
End: 2022-05-10
Payer: MEDICARE

## 2022-05-10 DIAGNOSIS — Z95.0 PACEMAKER: Primary | ICD-10-CM

## 2022-05-10 DIAGNOSIS — I49.5 TACHY-BRADY SYNDROME (HCC): ICD-10-CM

## 2022-05-10 PROCEDURE — 93294 REM INTERROG EVL PM/LDLS PM: CPT | Performed by: INTERNAL MEDICINE

## 2022-07-26 ENCOUNTER — OFFICE VISIT (OUTPATIENT)
Dept: CARDIOLOGY | Age: 87
End: 2022-07-26
Payer: MEDICARE

## 2022-07-26 VITALS
RESPIRATION RATE: 18 BRPM | BODY MASS INDEX: 30.79 KG/M2 | WEIGHT: 196.2 LBS | SYSTOLIC BLOOD PRESSURE: 124 MMHG | HEART RATE: 82 BPM | OXYGEN SATURATION: 98 % | DIASTOLIC BLOOD PRESSURE: 69 MMHG | HEIGHT: 67 IN

## 2022-07-26 DIAGNOSIS — I10 ESSENTIAL HYPERTENSION: ICD-10-CM

## 2022-07-26 DIAGNOSIS — I25.10 ASHD (ARTERIOSCLEROTIC HEART DISEASE): ICD-10-CM

## 2022-07-26 DIAGNOSIS — I48.20 CHRONIC A-FIB (HCC): Primary | ICD-10-CM

## 2022-07-26 DIAGNOSIS — I50.32 CHRONIC DIASTOLIC CONGESTIVE HEART FAILURE (HCC): ICD-10-CM

## 2022-07-26 DIAGNOSIS — E78.2 MIXED HYPERLIPIDEMIA: ICD-10-CM

## 2022-07-26 DIAGNOSIS — Z95.0 PACEMAKER: ICD-10-CM

## 2022-07-26 PROCEDURE — G8417 CALC BMI ABV UP PARAM F/U: HCPCS | Performed by: INTERNAL MEDICINE

## 2022-07-26 PROCEDURE — G8427 DOCREV CUR MEDS BY ELIG CLIN: HCPCS | Performed by: INTERNAL MEDICINE

## 2022-07-26 PROCEDURE — 1123F ACP DISCUSS/DSCN MKR DOCD: CPT | Performed by: INTERNAL MEDICINE

## 2022-07-26 PROCEDURE — 99211 OFF/OP EST MAY X REQ PHY/QHP: CPT | Performed by: INTERNAL MEDICINE

## 2022-07-26 PROCEDURE — 99214 OFFICE O/P EST MOD 30 MIN: CPT | Performed by: INTERNAL MEDICINE

## 2022-07-26 PROCEDURE — 1036F TOBACCO NON-USER: CPT | Performed by: INTERNAL MEDICINE

## 2022-07-26 ASSESSMENT — ENCOUNTER SYMPTOMS
VOMITING: 0
DIARRHEA: 0
SPUTUM PRODUCTION: 0
COUGH: 0
ORTHOPNEA: 0
EYE PAIN: 0
BACK PAIN: 1
SHORTNESS OF BREATH: 1
HEARTBURN: 0
NAUSEA: 0
BLURRED VISION: 0
HEMOPTYSIS: 0
EYE DISCHARGE: 0
PHOTOPHOBIA: 0
ABDOMINAL PAIN: 0
CONSTIPATION: 0
DOUBLE VISION: 0

## 2022-07-26 NOTE — PATIENT INSTRUCTIONS
SURVEY:    You may be receiving a survey from [employer] regarding your visit today. Please complete the survey to enable us to provide the highest quality of care to you and your family. If you cannot score us a very good on any question, please call the office to discuss how we could have made your experience a very good one. Thank you.

## 2022-07-26 NOTE — PROGRESS NOTES
Arthur Urbano am scribing for and in the presence of Avinash Quintero MD, F.A.C.C..    Subjective:     [address] [facility]:   Chief Complaint   Patient presents with    Follow-up     HX: pre op, chronic Afib, CAD, S/P CABG, HTN, HLD, Pacer, tachy-wendi. Pt is here today for a 6 month f/u pt says he is doing okay. Sometimes thinks he feels palpitations but isn't too sure. SOB when he goes up stairs. Pt denies CP, light headed/dizziness. Dear Dr. Claude Wise MD    I had the pleasure of seeing Mr. Rangel for follow-up today. 1-Danny Rangel is 80 y.o. male with extensive cardiac history that include history of myocardial infarction status post bypass surgery in 1995, long-standing history of atrial fibrillation and history of abdominal aortic aneurysm status post repair. 2-Patient has long-standing history of atrial fibrillation that started after his bypass surgery, has been tried on multiple antiarrhythmic medications including sotalol which failed to control his atrial fibrillation, dofetilide 125 mg which has been discontinued at the time of his nephrectomy. He also has been tried on amiodarone but could not tolerate it because of side effect (lung toxicity). 3-Patient was reloaded with Tikosyn, which controlled his atrial fibrillation only partially. Patient was sent to [facility] for ablation but because of his age and his ling history of A Fib, they thought the success rate will not be that high. Decision is made to go for rate control and pacemaker placement. Patient underwent the procedure on 11/07/2016. 4-He was admitted for a low hemoglobin count (5/1/2018- 7.4) anemia secondary to acute blood loss from GI bleeding. His Xarelto is on hold. 5-Echocardiogram: 4/19/2018: EF 55% with moderate mitral regurgitation. 6-Cardiovascular stress test: 4/25/2018: Normal myocardial perfusion.      7-An admission to [facility] on 6/17/2018 because of large left-sided pleural effusion and acute on chronic diastolic CHF. Status post thoracentesis. 8- EKG done in office on 11/23/2020. 9-Pacemaker checked in office on 5/24/2021: Normal pacemaker function. Chronic atrial fibrillation. Ventricular rate is controlled. Expected battery is 4.5 years. 11-Pacer check in office 11/8/2021-normal pacemaker function. Battery is around 4 years. Chronic atrial fibrillation with controlled ventricular response. 12-He is being evaluated at [facility] for cerebral aneurysm. The plan was to do cerebral MRA and MRI but this was not possible because of his non-MRI compatible pacemaker. Mr. Elsa Awan is here for a 6 month follow up. He says he is doing well. He can get around well, is up and down stairs all day, he also works in his flowerbed. He does have difficulty walking since he had his AAA surgery. He says he does get more tired than he used too and he also does not have the strength that he used to have. Sometimes he has to take a break with whatever he is doing and then he can continue with that activity later. He does have some shortness of breath when he goes up stairs. He thinks he does have palpitations at times but does not always feel them, so he is a little unsure. He denies any chest pain, pressure, or tightness. He denies any light headed/dizziness. He denies any blood in his urine. He does have some blood in his stoma when he changes it. He takes something to try to help him sleep at night, but he cannot remember what it is called that he takes. He says it does not seem to help much. He denies any cough or fever. He denied any chest pain, pressure or tightness. No significant heart palpitations or lightheaded/ dizziness. No stomach pain, nausea or vomiting. No issues with his ileostomy at this time. No fever or cough. No orthopnea or PND. I reviewed his blood work, kidney function stable.      Past Medical History:     Past Medical History: Diagnosis Date    Abnormal ultrasound of lower extremity 7/22/15    Moderate to severe bilateral,multifocal,arterial insufficiency in  both legs. Minimal inflow component. Claudication present during exercise componet with worsenind NIVIA on the left post-exercise. Atrial fibrillation [facility])     s/p pacemaker placement due to failure of multiple rhythm control strategies    Bladder cancer ([facility] [address]     CAD (coronary artery disease)     Dyspnea on exertion 5/7/2019    H/O echocardiogram 04/19/2018    EF 55%. LV cavity size is within normal limits,LV wall thickness is mildly increased. No definite specific wall motion abnormalities were identified. Mitral annular calcification. Myxomatous thickening of the mitral leaflets. Moderate mitral regurg. rhythm of what appeared to be atrial fib. H/O total cystectomy 2/5/2019    For bladder CA in 2006, has an ileal conduit    History of abdominal aortic aneurysm (AAA) 2/5/2019    S/p repair 1993     History of bleeding ulcers     duodenal    History of Holter monitoring 9/21/15    atrial fibrillation/flutter throughout with an average HR of 67 bpm. 78 pauses >2 seconds and 1 pause >3 seconds,but all occured during typical hours of sleep. History of Holter monitoring 5/27/16    Paroxysmal atrial fib with rapid ventricular response (A-Fib 38% of the time). Occasional isolated PVC's    History of Holter monitoring 08/09/2016    Atrial Fibrillation throughout with heart rates ranging between  bpm.  Four 2 second pauses. Occasional PVC's    History of stress test 04/25/2018    Normal. LV systolic function cannot be assessed because of no gating. ECG is uninterpretable at baseline because of demand ventricular pacing. Low risk for significant CAD.     Hypertension     Pacemaker 11/07/2016    Medtronic     Polymyalgia rheumatica [facility])      Past Surgical History:  Past Surgical History:   Procedure Laterality Date    [facility] APPENDECTOMY      BLADDER SURGERY      CATARACT REMOVAL Bilateral     CHOLECYSTECTOMY      CORONARY ARTERY BYPASS GRAFT  1995    x3 [facility]    KIDNEY REMOVAL Left 2015    [facility]    PACEMAKER INSERTION      PROSTATECTOMY      TONSILLECTOMY AND ADENOIDECTOMY       Social History:    Social History     Tobacco Use   Smoking Status Former    Packs/day: 1.00    Years: 50.00    Pack years: 50.00    Types: Cigarettes, Pipe    Quit date:     Years since quittin.5   Smokeless Tobacco Never     Family history reviewed, noncontributory. Patient with known history of CAD, CHF, A. fib and pacemaker. Current Medications:  Outpatient Medications Marked as Taking for the 22 encounter (Office Visit) with Gabby Romano MD   Medication Sig Dispense Refill    torsemide (DEMADEX) 20 MG tablet Take 20 mg on mon wed and fri Take one half tablet on sun, tues,thurs and sat 30 tablet 5    metoprolol succinate (TOPROL XL) 100 MG extended release tablet TAKE ONE TABLET BY MOUTH TWICE A  tablet 3    umeclidinium-vilanterol (ANORO ELLIPTA) 62.5-25 MCG/INH AEPB inhaler Inhale 1 puff into the lungs daily 3 each 3    ELIQUIS 2.5 MG TABS tablet TAKE ONE TABLET BY MOUTH TWICE A  tablet 3    NONFORMULARY 1 capsule daily Pt takes Balance of Nutrition daily as 3 of Vegetables Capsules and 3 of the fruit capsules. senna (SENOKOT) 8.6 MG tablet as needed      glimepiride (AMARYL) 2 MG tablet Take 2 mg by mouth 2 times daily       Coenzyme Q10 (CO Q-10) 400 MG CAPS Take 100 mg by mouth 2 times daily       simvastatin (ZOCOR) 20 MG tablet Take 20 mg by mouth nightly       Multiple Vitamins-Minerals (CENTRUM SILVER) TABS Take 1 tablet by mouth daily          Review of Systems   Constitutional:  Negative for chills, diaphoresis, fever, malaise/fatigue and weight loss. HENT:  Negative for ear pain, hearing loss, nosebleeds and tinnitus.     Eyes:  Negative for blurred vision, double vision, photophobia, pain and discharge. Respiratory:  Positive for shortness of breath. Negative for cough, hemoptysis and sputum production. Breathing is better. Shortness of breath on exertion. Cardiovascular:  Negative for chest pain, palpitations, orthopnea, claudication, leg swelling and PND. Gastrointestinal:  Negative for abdominal pain, constipation, diarrhea, heartburn, nausea and vomiting. Genitourinary: Negative. Musculoskeletal:  Positive for back pain and joint pain. Negative for falls, myalgias and neck pain (.pt). Skin:  Negative for itching and rash. Neurological:  Negative for dizziness, speech change, focal weakness, loss of consciousness, weakness and headaches. Endo/Heme/Allergies:  Negative for environmental allergies and polydipsia. Does not bruise/bleed easily. Psychiatric/Behavioral: Negative. Objective:     PHYSICAL EXAM:      VITALS:    /69 (Site: Left Upper Arm, Position: Sitting, Cuff Size: Large Adult)   Pulse 82   Resp 18   Ht 5' 7\" (1.702 m)   Wt 196 lb 3.2 oz (89 kg)   SpO2 98%   BMI 30.73 kg/m²   CONSTITUTIONAL: Cooperative, no apparent distress, and appears well nourished / developed. NEUROLOGIC:  Awake and orientated to person, place and time. PSYCH: Calm affect. SKIN: Warm and dry. HEENT:  normocephalic, neck supple, no elevation of JVP, normal carotid pulses with no bruits and thyroid normal size. LUNGS: Mild decreased air entry at the left lung base. Cardiovascular: Normal rate, irregularly irregular rhythm, normal heart sounds. Exam reveals no gallop and no friction rubs. A IV/VI systolic was heard maximally at the 2nd right intercostal space. ABDOMEN:  Normal bowel sounds, non-distended and non-tender to palpation. + urostomy  Extremities: No edema. No cyanosis and no clubbing. Pulses are 2+ radial and carotid pulses. 2+ dorsalis pedis and posterior tibial pulses bilaterally. Bilateral leg tenderness.       DATA:    Lab Results Component Value Date    CREATININE 1.47 (H) 06/22/2021    BUN 31 (H) 06/22/2021     06/22/2021    K 5.0 06/22/2021     06/22/2021    CO2 25 06/22/2021       Assessment:      Diagnosis Orders   1. Chronic a-fib ([facility] [address]        2. ASHD (arteriosclerotic heart disease)        3. Chronic diastolic congestive heart failure ([facility] [address]        4. Essential hypertension        5. Pacemaker        6. Mixed hyperlipidemia          Plan:     Chronic Atrial Fibrillation: Rate Control S/P pacemaker placement on 11/07/2016  Beta Blocker: Continue metoprolol succinate (Toprol XL) 100 mg twice a day. [id number] Score for Atrial Fibrillation Stroke Risk   Risk   Factors  Component Value   C CHF Yes 1   H HTN Yes 1   A2 Age >= 76 Yes,  (80 y.o.) 2   D DM Yes 1   S2 Prior Stroke/TIA No 0   V Vascular Disease No 0   A Age 74-69 No,  (80 y.o.) 0   Sc Sex male 0    [id number]  Score  5   Score last updated 3/06/09 2:09 PM EST  Click here for a link to the UpToDate guideline \"Atrial Fibrillation: Anticoagulation therapy to prevent embolization  Disclaimer: Risk Score calculation is dependent on accuracy of patient problem list and past encounter diagnosis. Stroke Risk: His CHADS2-VASc score is 5/9 (6.7% stroke risk)  Anticoagulation: Continue Apixaban (Eliquis) 2.5 mg every 12 hours. Dose appropriate, age 80 and creatinine 1.5 mg/dL. Atherosclerotic Heart Disease: History of CABG in 1995   Antiplatelet Agent: Not indicated at this time. He is currently on Eliquis as above. Diuretics: Continue torsemide (Demodex) as directed by nephrology    20 mg daily  Diuretics: Continue Spironolactone (Aldactone) 25 mg daily. Beta Blocker: Continue metoprolol succinate (Toprol XL) 100 mg twice a day. Cholesterol Reduction Therapy: Continue simvastatin (Zocor) 20 mg daily. Chronic Diastolic Heart Failure: EF 55% on 4/2018  Beta Blocker: Continue metoprolol succinate (Toprol XL) 100 mg twice a day.       Diuretics: Continue torsemide (Demodex) as directed by nephrology     Diuretics: Continue Spironolactone (Aldactone) 25 mg daily. History of chronic kidney disease, single kidney, follow-up with nephrology as previously scheduled. Essential Hypertension: Controlled  Beta Blocker: Continue metoprolol succinate (Toprol XL) 100 mg twice a day. Diuretics: Continue torsemide (Demodex) as directed by nephrology     Diuretics: Continue Spironolactone (Aldactone) 25 mg daily. Hyperlipidemia: Mixed  Cholesterol Reduction Therapy: Continue simvastatin (Zocor) 20 mg daily. Dual Chamber Pacemaker:  Indication for Device Placement: Tachycardia-Bradycardia Syndrome   Status post AV node ablation. Currently paced at 80 bpm.    Diabetes: Continue follow up with Dr. Eli Barahona MD , his last Hemoglobin was done on 1/24/2022 and it was 7.6%. Finally, I recommended that he continue his other medications and follow up with you as previously scheduled. FOLLOW UP:  I told Mr. Rangel to call my office if he had any problems, but otherwise told him to Return in about 6 months (around 1/26/2023). However, I would be happy to see him sooner should the need arise. Sincerely,  Kyra Rutherford MD, F.A.C.C. [facility] Specialist    [address] [place] [address]  Phone: [phone], Fax: [phone]     I believe that the risk of significant morbidity and mortality related to the patient's current medical conditions are: intermediate-high. >30 minutes were spent during prep work, discussion and exam of the patient, and follow up documentation and all of their questions were answered. The documentation recorded by the scribe, accurately and completely reflects the services I personally performed and the decisions made by me. Kyra Rutherford MD, F.A.C.C.  July 26, 2022

## 2022-08-07 ENCOUNTER — HOSPITAL ENCOUNTER (EMERGENCY)
Age: 87
Discharge: HOME | End: 2022-08-07
Attending: EMERGENCY MEDICINE
Payer: MEDICARE

## 2022-08-07 VITALS
DIASTOLIC BLOOD PRESSURE: 60 MMHG | OXYGEN SATURATION: 95 % | HEART RATE: 72 BPM | TEMPERATURE: 100.1 F | SYSTOLIC BLOOD PRESSURE: 141 MMHG | RESPIRATION RATE: 16 BRPM

## 2022-08-07 DIAGNOSIS — U07.1 COVID-19: Primary | ICD-10-CM

## 2022-08-07 LAB
SARS-COV-2, RAPID: DETECTED
SPECIMEN DESCRIPTION: ABNORMAL

## 2022-08-07 PROCEDURE — 6360000002 HC RX W HCPCS: Performed by: EMERGENCY MEDICINE

## 2022-08-07 PROCEDURE — 96374 THER/PROPH/DIAG INJ IV PUSH: CPT

## 2022-08-07 PROCEDURE — 87635 SARS-COV-2 COVID-19 AMP PRB: CPT

## 2022-08-07 PROCEDURE — C9803 HOPD COVID-19 SPEC COLLECT: HCPCS

## 2022-08-07 PROCEDURE — 2580000003 HC RX 258: Performed by: EMERGENCY MEDICINE

## 2022-08-07 PROCEDURE — 99284 EMERGENCY DEPT VISIT MOD MDM: CPT

## 2022-08-07 RX ORDER — SODIUM CHLORIDE 0.9 % (FLUSH) 0.9 %
5-40 SYRINGE (ML) INJECTION ONCE
Status: COMPLETED | OUTPATIENT
Start: 2022-08-07 | End: 2022-08-07

## 2022-08-07 RX ORDER — BEBTELOVIMAB 87.5 MG/ML
175 INJECTION, SOLUTION INTRAVENOUS ONCE
Status: COMPLETED | OUTPATIENT
Start: 2022-08-07 | End: 2022-08-07

## 2022-08-07 RX ADMIN — SODIUM CHLORIDE, PRESERVATIVE FREE 10 ML: 5 INJECTION INTRAVENOUS at 21:40

## 2022-08-07 RX ADMIN — BEBTELOVIMAB 175 MG: 87.5 INJECTION, SOLUTION INTRAVENOUS at 21:40

## 2022-08-07 ASSESSMENT — PAIN - FUNCTIONAL ASSESSMENT
PAIN_FUNCTIONAL_ASSESSMENT: NONE - DENIES PAIN
PAIN_FUNCTIONAL_ASSESSMENT: NONE - DENIES PAIN

## 2022-08-08 ENCOUNTER — CARE COORDINATION (OUTPATIENT)
Dept: CARE COORDINATION | Age: 87
End: 2022-08-08

## 2022-08-08 NOTE — DISCHARGE INSTRUCTIONS
If you have difficulty keeping fluids down, shortness of breath or other concerns please return to emergency department. Otherwise follow-up with primary care physician as needed.

## 2022-08-08 NOTE — ED PROVIDER NOTES
[facility] ED  EMERGENCY DEPARTMENT ENCOUNTER      Pt Name: Giovanny Ga  MRN: [id number]  [place] 12/24/1931  Date of evaluation: 8/7/2022  Provider: Hue Waters MD    CHIEF COMPLAINT       Chief Complaint   Patient presents with    Positive For Covid-19     Sent to ed for atb, Dr advised him to come here         HISTORY OF PRESENT ILLNESS   (Location/Symptom, Timing/Onset, Context/Setting, Quality, Duration, Modifying Factors, Severity)  Note limiting factors. Giovanny Ga is a 80 y.o. male who presents to the emergency department concerns with having COVID-19 and primary care physician calling prior to patient's arrival requesting patient receive monoclonal antibody. Patient states he had a fever feels tired denies any chest pain denies any decreased p.o. intake. Denies any acute focal neurodeficit. HPI    Nursing Notes were reviewed. REVIEW OF SYSTEMS    (2-9 systems for level 4, 10 or more for level 5)     Review of Systems   All other systems reviewed and are negative. Except as noted above the remainder of the review of systems was reviewed and negative. PAST MEDICAL HISTORY     Past Medical History:   Diagnosis Date    Abnormal ultrasound of lower extremity 7/22/15    Moderate to severe bilateral,multifocal,arterial insufficiency in  both legs. Minimal inflow component. Claudication present during exercise componet with worsenind NIVIA on the left post-exercise. Atrial fibrillation [facility])     s/p pacemaker placement due to failure of multiple rhythm control strategies    Bladder cancer ([facility] [address]     CAD (coronary artery disease)     Dyspnea on exertion 5/7/2019    H/O echocardiogram 04/19/2018    EF 55%. LV cavity size is within normal limits,LV wall thickness is mildly increased. No definite specific wall motion abnormalities were identified. Mitral annular calcification. Myxomatous thickening of the mitral leaflets. Moderate mitral regurg.  rhythm of what appeared to be atrial fib. H/O total cystectomy 2/5/2019    For bladder CA in 2006, has an ileal conduit    History of abdominal aortic aneurysm (AAA) 2/5/2019    S/p repair 1993     History of bleeding ulcers     duodenal    History of Holter monitoring 9/21/15    atrial fibrillation/flutter throughout with an average HR of 67 bpm. 78 pauses >2 seconds and 1 pause >3 seconds,but all occured during typical hours of sleep. History of Holter monitoring 5/27/16    Paroxysmal atrial fib with rapid ventricular response (A-Fib 38% of the time). Occasional isolated PVC's    History of Holter monitoring 08/09/2016    Atrial Fibrillation throughout with heart rates ranging between  bpm.  Four 2 second pauses. Occasional PVC's    History of stress test 04/25/2018    Normal. LV systolic function cannot be assessed because of no gating. ECG is uninterpretable at baseline because of demand ventricular pacing. Low risk for significant CAD.     Hypertension     Pacemaker 11/07/2016    Medtronic     Polymyalgia rheumatica ([address]          SURGICAL HISTORY       Past Surgical History:   Procedure Laterality Date    ABDOMINAL AORTIC ANEURYSM [address] [facility]    APPENDECTOMY      BLADDER SURGERY      CATARACT REMOVAL Bilateral     CHOLECYSTECTOMY      CORONARY ARTERY BYPASS GRAFT  1995    x3 [facility]    KIDNEY REMOVAL Left 2015    [facility]    PACEMAKER INSERTION      PROSTATECTOMY      TONSILLECTOMY AND ADENOIDECTOMY           CURRENT MEDICATIONS       Discharge Medication List as of 8/7/2022 10:44 PM        CONTINUE these medications which have NOT CHANGED    Details   torsemide (DEMADEX) 20 MG tablet Take 20 mg on mon wed and fri Take one half tablet on sun, tues,thurs and sat, Disp-30 tablet, R-5Normal      metoprolol succinate (TOPROL XL) 100 MG extended release tablet TAKE ONE TABLET BY MOUTH TWICE A DAY, Disp-180 tablet, R-3Normal      umeclidinium-vilanterol (ANORO ELLIPTA) 62.5-25 MCG/INH AEPB inhaler Inhale 1 puff into the lungs daily, Disp-3 each, R-3Normal      ELIQUIS 2.5 MG TABS tablet TAKE ONE TABLET BY MOUTH TWICE A DAY, Disp-180 tablet, R-3Normal      spironolactone (ALDACTONE) 25 MG tablet Take 1 tablet by mouth daily, Disp-90 tablet, R-3Normal      NONFORMULARY 1 capsule daily Pt takes Balance of Nutrition daily as 3 of Vegetables Capsules and 3 of the fruit capsules. Historical Med      senna (SENOKOT) 8.6 MG tablet as neededHistorical Med      albuterol sulfate  (90 Base) MCG/ACT inhaler Inhale 2 puffs into the lungs 4 times daily as needed for Wheezing, Disp-1 Inhaler, R-0Normal      albuterol (PROVENTIL) (2.5 MG/3ML) 0.083% nebulizer solution Take 3 mLs by nebulization every 6 hours as needed for Wheezing or Shortness of Breath, Disp-120 each, R-3Normal      glimepiride (AMARYL) 2 MG tablet Take 2 mg by mouth 2 times daily Historical Med      Coenzyme Q10 (CO Q-10) 400 MG CAPS Take 100 mg by mouth 2 times daily Historical Med      simvastatin (ZOCOR) 20 MG tablet Take 20 mg by mouth nightly Historical Med      Multiple Vitamins-Minerals (CENTRUM SILVER) TABS Take 1 tablet by mouth daily Historical Med             ALLERGIES     Neomycin-bacitracin zn-polymyx, Bacitracin, and Penicillins    FAMILY HISTORY       Family History   Problem Relation Age of Onset    No Known Problems Mother     Other Father         Viral Encephalitis          SOCIAL HISTORY       Social History     Socioeconomic History    Marital status:      Spouse name: None    Number of children: None    Years of education: None    Highest education level: None   Tobacco Use    Smoking status: Former     Packs/day: 1.00     Years: 50.00     Pack years: 50.00     Types: Cigarettes, Pipe     Quit date:      Years since quittin.6    Smokeless tobacco: Never   Vaping Use    Vaping Use: Never used   Substance and Sexual Activity    Alcohol use: No     Alcohol/week: 0.0 standard drinks    Drug use:  No SCREENINGS         Rosemary Coma Scale  Eye Opening: Spontaneous  Best Verbal Response: Oriented  Best Motor Response: Obeys commands  Rosemary Coma Scale Score: 15                     CIWA Assessment  BP: (!) 141/60  Heart Rate: 72                 PHYSICAL EXAM    (up to 7 for level 4, 8 or more for level 5)     ED Triage Vitals [08/07/22 2037]   BP Temp Temp Source Heart Rate Resp SpO2 Height Weight   (!) 183/81 100.1 °F (37.8 °C) Tympanic 92 20 97 % -- --       Physical Exam    DIAGNOSTIC RESULTS     EKG: All EKG's are interpreted by the Emergency Department Physician who either signs or Co-signs this chart in the absence of a cardiologist.        RADIOLOGY:   Non-plain film images such as CT, Ultrasound and MRI are read by the radiologist. Plain radiographic images are visualized and preliminarily interpreted by the emergency physician with the below findings:        Interpretation per the Radiologist below, if available at the time of this note:    No orders to display         ED BEDSIDE ULTRASOUND:   Performed by ED Physician - none    LABS:  Labs Reviewed   COVID-19, RAPID - Abnormal; Notable for the following components:       Result Value    SARS-CoV-2, Rapid DETECTED (*)     All other components within normal limits       All other labs were within normal range or not returned as of this dictation. EMERGENCY DEPARTMENT COURSE and DIFFERENTIAL DIAGNOSIS/MDM:   Vitals:    Vitals:    08/07/22 2147 08/07/22 2215 08/07/22 2241 08/07/22 2242   BP: (!) 158/69 (!) 154/66 (!) 141/60 (!) 141/60   Pulse:   72    Resp:   16    Temp:       TempSrc:       SpO2: 96% 96% 96% 95%         MDM  Number of Diagnoses or Management Options  COVID-19  Diagnosis management comments: 51-year-old male presenting with concern of having COVID-19 and his primary care physician did call prior to arrival and request the patient receive monoclonal antibody infusion.   Patient did receive this without any complications and patient was not in any acute respiratory distress therefore patient was discharged extensive return precautions at time of discharge patient was found p.o. not hypotensive or tachycardic and otherwise acutely clinically stable. REASSESSMENT          CRITICAL CARE TIME   Total Critical Care time was  minutes, excluding separately reportable procedures. There was a high probability of clinically significant/life threatening deterioration in the patient's condition which required my urgent intervention. CONSULTS:  None    PROCEDURES:  Unless otherwise noted below, none     Procedures        FINAL IMPRESSION      1. COVID-19          DISPOSITION/PLAN   DISPOSITION Decision To Discharge 08/07/2022 10:43:30 PM      PATIENT REFERRED TO:  Carlo sManuel Edmondson MD  [address] [place] [address]  [phone]      As needed    DISCHARGE MEDICATIONS:  Discharge Medication List as of 8/7/2022 10:44 PM        Controlled Substances Monitoring:     No flowsheet data found.     (Please note that portions of this note were completed with a voice recognition program.  Efforts were made to edit the dictations but occasionally words are mis-transcribed.)    Carolina Rosales MD (electronically signed)  Attending Emergency Physician            Carolina Rosales MD  08/07/22 [id number]

## 2022-08-08 NOTE — CARE COORDINATION
Outreach for ED follow up/ COVID protocol   COVID test; positive   LVM message with contact information, requested call back

## 2022-08-10 ENCOUNTER — CARE COORDINATION (OUTPATIENT)
Dept: CARE COORDINATION | Age: 87
End: 2022-08-10

## 2022-08-10 NOTE — CARE COORDINATION
Patient contacted regarding COVID-19 diagnosis and monoclonal antibody infusion follow up. Discussed COVID-19 related testing which was available at this time. Test results were positive. Patient informed of results, if available? Yes. Ambulatory Care Manager contacted the patient by telephone to perform post discharge assessment. Call within 2 business days of discharge: Yes. Verified name and  with patient as identifiers. Provided introduction to self, and explanation of the CTN/ACM role, and reason for call due to risk factors for infection and/or exposure to COVID-19. Symptoms reviewed with patient who verbalized the following symptoms: no new symptoms, no worsening symptoms      Due to no new or worsening symptoms encounter was not routed to provider for escalation. Discussed follow-up appointments. If no appointment was previously scheduled, appointment scheduling offered: Yes. [facility] follow up appointment(s):   Future Appointments   Date Time Provider Ag Dela Cruz   2022 10:40 AM SCHEDULE, [facility] TIFF CARD [facility]   2023  1:00 PM Driss Guidry DO TIFF PULM [facility]   2023  1:00 PM Greg Oscar MD TIFF CARD [facility]   2023  8:30 AM SCHEDULE, [facility] CAR [employer] TIFF CARD [facility]     Non-[facility] follow up appointment(s): no     Non-face-to-face services provided:  Obtained and reviewed discharge summary and/or continuity of care documents  Education of patient/family/caregiver/guardian to support self-management-reviewed      Advance Care Planning:   Does patient have an Advance Directive:  not on file. Educated patient about risk for severe COVID-19 due to risk factors according to CDC guidelines. ACM reviewed discharge instructions, medical action plan and red flag symptoms with the patient who verbalized understanding. Discussed COVID vaccination status: No. Education provided on COVID-19 vaccination as appropriate.  Discussed exposure protocols and quarantine with CDC Guidelines. Patient was given an opportunity to verbalize any questions and concerns and agrees to contact ACM or health care provider for questions related to their healthcare. Reviewed and educated patient on any new and changed medications related to discharge diagnosis     Was patient discharged with a pulse oximeter? no      ACM provided contact information. Plan for follow-up call in 5-7 days based on severity of symptoms and risk factors.

## 2022-08-15 ENCOUNTER — CARE COORDINATION (OUTPATIENT)
Dept: CARE COORDINATION | Age: 87
End: 2022-08-15

## 2022-08-25 ENCOUNTER — HOSPITAL ENCOUNTER (OUTPATIENT)
Dept: GENERAL RADIOLOGY | Age: 87
Discharge: HOME | End: 2022-08-27
Payer: MEDICARE

## 2022-08-25 ENCOUNTER — HOSPITAL ENCOUNTER (OUTPATIENT)
Age: 87
Discharge: HOME | End: 2022-08-25
Payer: MEDICARE

## 2022-08-25 ENCOUNTER — OFFICE VISIT (OUTPATIENT)
Dept: CARDIOLOGY | Age: 87
End: 2022-08-25
Payer: MEDICARE

## 2022-08-25 ENCOUNTER — HOSPITAL ENCOUNTER (OUTPATIENT)
Age: 87
Discharge: HOME | End: 2022-08-27
Payer: MEDICARE

## 2022-08-25 VITALS
OXYGEN SATURATION: 98 % | DIASTOLIC BLOOD PRESSURE: 76 MMHG | SYSTOLIC BLOOD PRESSURE: 139 MMHG | BODY MASS INDEX: 30.39 KG/M2 | HEIGHT: 67 IN | RESPIRATION RATE: 18 BRPM | WEIGHT: 193.6 LBS | HEART RATE: 84 BPM

## 2022-08-25 DIAGNOSIS — I50.32 CHRONIC DIASTOLIC CONGESTIVE HEART FAILURE (HCC): ICD-10-CM

## 2022-08-25 DIAGNOSIS — I48.20 CHRONIC A-FIB (HCC): ICD-10-CM

## 2022-08-25 DIAGNOSIS — I48.20 CHRONIC A-FIB (HCC): Primary | ICD-10-CM

## 2022-08-25 DIAGNOSIS — I25.10 ASHD (ARTERIOSCLEROTIC HEART DISEASE): ICD-10-CM

## 2022-08-25 DIAGNOSIS — I49.5 TACHY-BRADY SYNDROME (HCC): ICD-10-CM

## 2022-08-25 DIAGNOSIS — Z95.0 PACEMAKER: ICD-10-CM

## 2022-08-25 DIAGNOSIS — E78.2 MIXED HYPERLIPIDEMIA: ICD-10-CM

## 2022-08-25 DIAGNOSIS — I10 ESSENTIAL HYPERTENSION: ICD-10-CM

## 2022-08-25 DIAGNOSIS — U09.9 PERSISTENT FATIGUE AFTER COVID-19: ICD-10-CM

## 2022-08-25 DIAGNOSIS — R53.83 PERSISTENT FATIGUE AFTER COVID-19: ICD-10-CM

## 2022-08-25 LAB
ANION GAP SERPL CALCULATED.3IONS-SCNC: 10 MMOL/L (ref 9–17)
BUN BLDV-MCNC: 32 MG/DL (ref 8–23)
BUN/CREAT BLD: 20 (ref 9–20)
CALCIUM SERPL-MCNC: 9.9 MG/DL (ref 8.6–10.4)
CHLORIDE BLD-SCNC: 100 MMOL/L (ref 98–107)
CO2: 28 MMOL/L (ref 20–31)
CREAT SERPL-MCNC: 1.58 MG/DL (ref 0.7–1.2)
GFR AFRICAN AMERICAN: 50 ML/MIN
GFR NON-AFRICAN AMERICAN: 41 ML/MIN
GFR SERPL CREATININE-BSD FRML MDRD: ABNORMAL ML/MIN/{1.73_M2}
GFR SERPL CREATININE-BSD FRML MDRD: ABNORMAL ML/MIN/{1.73_M2}
GLUCOSE BLD-MCNC: 262 MG/DL (ref 70–99)
HCT VFR BLD CALC: 48.1 % (ref 40.7–50.3)
HEMOGLOBIN: 15.5 G/DL (ref 13–17)
MCH RBC QN AUTO: 28.8 PG (ref 25.2–33.5)
MCHC RBC AUTO-ENTMCNC: 32.2 G/DL (ref 28.4–34.8)
MCV RBC AUTO: 89.4 FL (ref 82.6–102.9)
NRBC AUTOMATED: 0 PER 100 WBC
PDW BLD-RTO: 12.9 % (ref 11.8–14.4)
PLATELET # BLD: 145 K/UL (ref 138–453)
PMV BLD AUTO: 9.3 FL (ref 8.1–13.5)
POTASSIUM SERPL-SCNC: 4.5 MMOL/L (ref 3.7–5.3)
PRO-BNP: 1604 PG/ML
RBC # BLD: 5.38 M/UL (ref 4.21–5.77)
SODIUM BLD-SCNC: 138 MMOL/L (ref 135–144)
WBC # BLD: 7.1 K/UL (ref 3.5–11.3)

## 2022-08-25 PROCEDURE — 93005 ELECTROCARDIOGRAM TRACING: CPT | Performed by: INTERNAL MEDICINE

## 2022-08-25 PROCEDURE — G8427 DOCREV CUR MEDS BY ELIG CLIN: HCPCS | Performed by: PHYSICIAN ASSISTANT

## 2022-08-25 PROCEDURE — 71046 X-RAY EXAM CHEST 2 VIEWS: CPT

## 2022-08-25 PROCEDURE — 85027 COMPLETE CBC AUTOMATED: CPT

## 2022-08-25 PROCEDURE — 93280 PM DEVICE PROGR EVAL DUAL: CPT | Performed by: PHYSICIAN ASSISTANT

## 2022-08-25 PROCEDURE — 1036F TOBACCO NON-USER: CPT | Performed by: PHYSICIAN ASSISTANT

## 2022-08-25 PROCEDURE — 93005 ELECTROCARDIOGRAM TRACING: CPT | Performed by: PHYSICIAN ASSISTANT

## 2022-08-25 PROCEDURE — 1123F ACP DISCUSS/DSCN MKR DOCD: CPT | Performed by: PHYSICIAN ASSISTANT

## 2022-08-25 PROCEDURE — 83880 ASSAY OF NATRIURETIC PEPTIDE: CPT

## 2022-08-25 PROCEDURE — 93010 ELECTROCARDIOGRAM REPORT: CPT | Performed by: INTERNAL MEDICINE

## 2022-08-25 PROCEDURE — 99214 OFFICE O/P EST MOD 30 MIN: CPT | Performed by: PHYSICIAN ASSISTANT

## 2022-08-25 PROCEDURE — 99211 OFF/OP EST MAY X REQ PHY/QHP: CPT | Performed by: PHYSICIAN ASSISTANT

## 2022-08-25 PROCEDURE — G8417 CALC BMI ABV UP PARAM F/U: HCPCS | Performed by: PHYSICIAN ASSISTANT

## 2022-08-25 PROCEDURE — 36415 COLL VENOUS BLD VENIPUNCTURE: CPT

## 2022-08-25 PROCEDURE — 80048 BASIC METABOLIC PNL TOTAL CA: CPT

## 2022-08-25 RX ORDER — ZINC GLUCONATE 50 MG
50 TABLET ORAL DAILY
COMMUNITY

## 2022-08-25 RX ORDER — ASCORBIC ACID 500 MG
500 TABLET ORAL DAILY
COMMUNITY

## 2022-08-25 RX ORDER — MULTIVIT-MIN/IRON/FOLIC ACID/K 18-600-40
CAPSULE ORAL
COMMUNITY

## 2022-08-25 ASSESSMENT — ENCOUNTER SYMPTOMS
BACK PAIN: 1
SHORTNESS OF BREATH: 1
ABDOMINAL PAIN: 0
COUGH: 0
VOMITING: 0
NAUSEA: 0
HEMOPTYSIS: 0
ORTHOPNEA: 0
SPUTUM PRODUCTION: 0
BLURRED VISION: 0
CONSTIPATION: 0
HEARTBURN: 0
DIARRHEA: 0
PHOTOPHOBIA: 0
DOUBLE VISION: 0
EYE PAIN: 0
EYE DISCHARGE: 0

## 2022-08-25 NOTE — PATIENT INSTRUCTIONS
SURVEY:    You may be receiving a survey from [employer] regarding your visit today. Please complete the survey to enable us to provide the highest quality of care to you and your family. If you cannot score us a very good on any question, please call the office to discuss how we could have made your experience a very good one. Thank you.

## 2022-08-25 NOTE — PROGRESS NOTES
I, Dahiaan Milder am scribing for and in the presence of Jeffrey Fritz PA-C. Subjective:     CHIEF COMPLAINT / HPI:   Chief Complaint   Patient presents with    Follow-up     HX:Chronic AFib Pt is here for follow up he was + for COVID had infusion on 8/9 he just had sore throat he feels weak. He does have a heavy feeling in chest 2-3 times daily, thinks activity brings it on, he was SOB walking in. He had palp during COVID. Denies:lightheaded/dizziness     Dear Dr. Tim Gil MD    I had the pleasure of seeing Mr. Rangel for follow-up today. 1-Danny Rangel is 80 y.o. male with extensive cardiac history that include history of myocardial infarction status post bypass surgery in 1995, long-standing history of atrial fibrillation and history of abdominal aortic aneurysm status post repair. 2-Patient has long-standing history of atrial fibrillation that started after his bypass surgery, has been tried on multiple antiarrhythmic medications including sotalol which failed to control his atrial fibrillation, dofetilide 125 mg which has been discontinued at the time of his nephrectomy. He also has been tried on amiodarone but could not tolerate it because of side effect (lung toxicity). 3-Patient was reloaded with Tikosyn, which controlled his atrial fibrillation only partially. Patient was sent to [facility] for ablation but because of his age and his ling history of A Fib, they thought the success rate will not be that high. Decision is made to go for rate control and pacemaker placement. Patient underwent the procedure on 11/07/2016. 4-He was admitted for a low hemoglobin count (5/1/2018- 7.4) anemia secondary to acute blood loss from GI bleeding. His Xarelto is on hold. 5-Echocardiogram: 4/19/2018: EF 55% with moderate mitral regurgitation. 6-Cardiovascular stress test: 4/25/2018: Normal myocardial perfusion.      7-An admission to [facility] on 6/17/2018 because of large left-sided pleural effusion and acute on chronic diastolic CHF. Status post thoracentesis. 8- EKG done in office on 11/23/2020. 9-Pacemaker checked in office on 5/24/2021: Normal pacemaker function. Chronic atrial fibrillation. Ventricular rate is controlled. Expected battery is 4.5 years. 11-Pacer check in office 11/8/2021-normal pacemaker function. Battery is around 4 years. Chronic atrial fibrillation with controlled ventricular response. 12-He is being evaluated at [facility] for cerebral aneurysm. The plan was to do cerebral MRA and MRI but this was not possible because of his non-MRI compatible pacemaker. 13- EKG done today in office 8/25/2022- No acute ischemic changes. 14- Pacemaker check in office today 8/25/2022- 3 years left on battery life. Mr. Mary Woodard is here for not feeling like himself and wanting further evaluation. He had COVID on 8/8/2022. He reports he has chest discomfort that feels like an ache. His chest pain does not radiate and is not worse with exertion. He reports once he breaths out it is better. He had COVID on 8/8 and had infusion on 8/9/2022. He does feel fatigue which is unusual for him. His only symptom was sore throat with COVID and it has gotten better. He does have back and fourth bowel movement, constipated at times and loose stools other times. Denies any blood in urine or stools. He denied any chest pain, pressure or tightness. No significant heart palpitations or lightheaded/ dizziness. No stomach pain, nausea or vomiting. No issues with his ileostomy at this time. No fever or cough. No orthopnea or PND. I reviewed his blood work, kidney function stable. Past Medical History:     Past Medical History:   Diagnosis Date    Abnormal ultrasound of lower extremity 7/22/15    Moderate to severe bilateral,multifocal,arterial insufficiency in  both legs. Minimal inflow component.   Claudication present during exercise componet with worsenind NIVIA on the left post-exercise. Atrial fibrillation [facility])     s/p pacemaker placement due to failure of multiple rhythm control strategies    Bladder cancer ([address]     CAD (coronary artery disease)     Dyspnea on exertion 5/7/2019    H/O echocardiogram 04/19/2018    EF 55%. LV cavity size is within normal limits,LV wall thickness is mildly increased. No definite specific wall motion abnormalities were identified. Mitral annular calcification. Myxomatous thickening of the mitral leaflets. Moderate mitral regurg. rhythm of what appeared to be atrial fib. H/O total cystectomy 2/5/2019    For bladder CA in 2006, has an ileal conduit    History of abdominal aortic aneurysm (AAA) 2/5/2019    S/p repair 1993     History of bleeding ulcers     duodenal    History of Holter monitoring 9/21/15    atrial fibrillation/flutter throughout with an average HR of 67 bpm. 78 pauses >2 seconds and 1 pause >3 seconds,but all occured during typical hours of sleep. History of Holter monitoring 5/27/16    Paroxysmal atrial fib with rapid ventricular response (A-Fib 38% of the time). Occasional isolated PVC's    History of Holter monitoring 08/09/2016    Atrial Fibrillation throughout with heart rates ranging between  bpm.  Four 2 second pauses. Occasional PVC's    History of stress test 04/25/2018    Normal. LV systolic function cannot be assessed because of no gating. ECG is uninterpretable at baseline because of demand ventricular pacing. Low risk for significant CAD.     Hypertension     Pacemaker 11/07/2016    [employer]     Polymyalgia rheumatica [facility])      Past Surgical History:  Past Surgical History:   Procedure Laterality Date    [facility]      CATARACT REMOVAL Bilateral     CHOLECYSTECTOMY      CORONARY ARTERY BYPASS GRAFT  1995    x3 [facility] [employer] [facility]    KIDNEY REMOVAL Left 2015    [facility]    PACEMAKER INSERTION      PROSTATECTOMY      TONSILLECTOMY AND ADENOIDECTOMY       Social History:    Social History     Tobacco Use   Smoking Status Former    Packs/day: 1.00    Years: 50.00    Pack years: 50.00    Types: Cigarettes, Pipe    Quit date:     Years since quittin.6   Smokeless Tobacco Never     Family history reviewed, noncontributory. Patient with known history of CAD, CHF, A. fib and pacemaker. Current Medications:  Outpatient Medications Marked as Taking for the 22 encounter (Office Visit) with Marshall Davalos PA-C   Medication Sig Dispense Refill    vitamin C (ASCORBIC ACID) 500 MG tablet Take 500 mg by mouth daily      Cholecalciferol (VITAMIN D) 50 MCG (2000) CAPS capsule Take by mouth      zinc gluconate 50 MG tablet Take 50 mg by mouth daily      torsemide (DEMADEX) 20 MG tablet Take 20 mg on  and fri Take one half tablet on sun, tues,th and sat 30 tablet 5    metoprolol succinate (TOPROL XL) 100 MG extended release tablet TAKE ONE TABLET BY MOUTH TWICE A  tablet 3    umeclidinium-vilanterol (ANORO ELLIPTA) 62.5-25 MCG/INH AEPB inhaler Inhale 1 puff into the lungs daily 3 each 3    ELIQUIS 2.5 MG TABS tablet TAKE ONE TABLET BY MOUTH TWICE A  tablet 3    spironolactone (ALDACTONE) 25 MG tablet Take 1 tablet by mouth daily 90 tablet 3    NONFORMULARY 1 capsule daily Pt takes Balance of Nutrition daily as 3 of Vegetables Capsules and 3 of the fruit capsules. glimepiride (AMARYL) 2 MG tablet Take 2 mg by mouth 2 times daily       Coenzyme Q10 (CO Q-10) 400 MG CAPS Take 100 mg by mouth 2 times daily       simvastatin (ZOCOR) 20 MG tablet Take 20 mg by mouth nightly          Review of Systems   Constitutional:  Negative for chills, diaphoresis, fever, malaise/fatigue and weight loss. HENT:  Negative for ear pain, hearing loss, nosebleeds and tinnitus. Eyes:  Negative for blurred vision, double vision, photophobia, pain and discharge. Respiratory:  Positive for shortness of breath. Negative for cough, hemoptysis and sputum production. Breathing is better. Shortness of breath on exertion. Cardiovascular:  Negative for chest pain, palpitations, orthopnea, claudication, leg swelling and PND. Gastrointestinal:  Negative for abdominal pain, constipation, diarrhea, heartburn, nausea and vomiting. Genitourinary: Negative. Musculoskeletal:  Positive for back pain and joint pain. Negative for falls, myalgias and neck pain (.pt). Skin:  Negative for itching and rash. Neurological:  Negative for dizziness, speech change, focal weakness, loss of consciousness, weakness and headaches. Endo/Heme/Allergies:  Negative for environmental allergies and polydipsia. Does not bruise/bleed easily. Psychiatric/Behavioral: Negative. Objective:     PHYSICAL EXAM:      VITALS:    /76 (Site: Left Upper Arm, Position: Sitting, Cuff Size: Medium Adult)   Pulse 84   Resp 18   Ht 5' 7\" (1.702 m)   Wt 193 lb 9.6 oz (87.8 kg)   SpO2 98%   BMI 30.32 kg/m²   CONSTITUTIONAL: Cooperative, no apparent distress, and appears well nourished / developed. NEUROLOGIC:  Awake and orientated to person, place and time. PSYCH: Calm affect. SKIN: Warm and dry. HEENT:  normocephalic, neck supple, no elevation of JVP, normal carotid pulses with no bruits and thyroid normal size. LUNGS: Mild decreased air entry at the left lung base. Cardiovascular: Normal rate, irregularly irregular rhythm, normal heart sounds. Exam reveals no gallop and no friction rubs. A IV/VI systolic was heard maximally at the 2nd right intercostal space. ABDOMEN:  Normal bowel sounds, non-distended and non-tender to palpation. + urostomy  Extremities: Trace lower extremity edema. No cyanosis and no clubbing. Pulses are 2+ radial and carotid pulses. 2+ dorsalis pedis and posterior tibial pulses bilaterally. Bilateral leg tenderness.       DATA:    Lab Results   Component Value Date    CREATININE 1.58 (H) 08/25/2022    BUN 32 (H) 08/25/2022     08/25/2022    K 4.5 08/25/2022     08/25/2022    CO2 28 08/25/2022       Assessment:      Diagnosis Orders   1. Chronic a-fib (HCC)  EKG 12 lead    XR CHEST STANDARD (2 VW)    CBC    Basic Metabolic Panel    Brain Natriuretic Peptide    Handicap placard      2. ASHD (arteriosclerotic heart disease)  EKG 12 lead    XR CHEST STANDARD (2 VW)    CBC    Basic Metabolic Panel    Brain Natriuretic Peptide    Handicap placard      3. Chronic diastolic congestive heart failure (HCC)  EKG 12 lead    XR CHEST STANDARD (2 VW)    CBC    Basic Metabolic Panel    Brain Natriuretic Peptide    Handicap placard      4. Essential hypertension  EKG 12 lead    XR CHEST STANDARD (2 VW)    CBC    Basic Metabolic Panel    Brain Natriuretic Peptide    Handicap placard      5. Mixed hyperlipidemia  EKG 12 lead    XR CHEST STANDARD (2 VW)    CBC    Basic Metabolic Panel    Brain Natriuretic Peptide    Handicap placard      6. Pacemaker  EKG 12 lead    XR CHEST STANDARD (2 VW)    CBC    Basic Metabolic Panel    Brain Natriuretic Peptide    Handicap placard      7. Tachy-wendi syndrome (HCC)  EKG 12 lead    XR CHEST STANDARD (2 VW)    CBC    Basic Metabolic Panel    Brain Natriuretic Peptide    Handicap placard      8. Persistent fatigue after COVID-19          Plan:     Chronic Atrial Fibrillation: Rate Control S/P pacemaker placement on 11/07/2016  Beta Blocker: Continue metoprolol succinate (Toprol XL) 100 mg twice a day.    [id number] Score for Atrial Fibrillation Stroke Risk   Risk   Factors  Component Value   C CHF Yes 1   H HTN Yes 1   A2 Age >= 76 Yes,  (80 y.o.) 2   D DM Yes 1   S2 Prior Stroke/TIA No 0   V Vascular Disease No 0   A Age 74-69 No,  (80 y.o.) 0   Sc Sex male 0    [id number]  Score  5   Score last updated 5/09/86 4:68 PM EST  Click here for a link to the UpToDate guideline \"Atrial Fibrillation: Anticoagulation therapy to prevent embolization  Disclaimer: Risk Score calculation is dependent on accuracy of patient problem list and past encounter diagnosis. Stroke Risk: His CHADS2-VASc score is 5/9 (6.7% stroke risk)  Anticoagulation: Continue Apixaban (Eliquis) 2.5 mg every 12 hours. Dose appropriate, age 80 and creatinine 1.5 mg/dL. Atherosclerotic Heart Disease: History of CABG in 1995   Antiplatelet Agent: Not indicated at this time. He is currently on Eliquis as above. Diuretics: Continue torsemide (Demodex) as directed by nephrology    20 mg daily  Diuretics: Continue Spironolactone (Aldactone) 25 mg daily. Beta Blocker: Continue metoprolol succinate (Toprol XL) 100 mg twice a day. Cholesterol Reduction Therapy: Continue simvastatin (Zocor) 20 mg daily. Chronic Diastolic Heart Failure: EF 55% on 4/2018  Beta Blocker: Continue metoprolol succinate (Toprol XL) 100 mg twice a day. Diuretics: Continue torsemide (Demodex) as directed by nephrology     Diuretics: Continue Spironolactone (Aldactone) 25 mg daily. History of chronic kidney disease, single kidney, follow-up with nephrology as previously scheduled. Essential Hypertension: Controlled  Beta Blocker: Continue metoprolol succinate (Toprol XL) 100 mg twice a day. Diuretics: Continue torsemide (Demodex) as directed by nephrology     Diuretics: Continue Spironolactone (Aldactone) 25 mg daily. Hyperlipidemia: Mixed  Cholesterol Reduction Therapy: Continue simvastatin (Zocor) 20 mg daily. Dual Chamber Pacemaker:  Indication for Device Placement: Tachycardia-Bradycardia Syndrome   Status post AV node ablation. Currently paced at 80 bpm.  Interrogated device today, no changes made. Diabetes: Continue follow up with Dr. Melyssa Stephen MD , his last Hemoglobin was done on 1/24/2022 and it was 7.6%. COVID Positive on 8/8/2022/Fatigue  I took the liberty of ordering a BMP for today to assess their potassium and renal function.  I told them that they could get their lab work performed at the location of their choosing, unfortunately, if the lab work was not performed at a [facility]) facility I could not guarantee my ability to follow up with them on their results. ,  I took the liberty of ordering a CBC. I told them that they could get their lab work performed at the location of their choosing, unfortunately, if the lab work was not performed at a [facility]) facility I could not guarantee my ability to follow up with them on their results. , and I ordered a pro BNP level to better assess for the patients level of heart failure. I told them that they could get their lab work performed at the location of their choosing, unfortunately, if the lab work was not performed at a [facility]) facility I could not guarantee my ability to follow up with them on their results. Because of this problem, I think it would be reardon to better assess this problem with a two view chest X-ray and therefore I took the liberty of ordering this. Finally, I recommended that he continue his other medications and follow up with you as previously scheduled. FOLLOW UP:  I told Mr. Rangel to call my office if he had any problems, but otherwise told him to Return in about 22 weeks (around 1/26/2023). However, I would be happy to see him sooner should the need arise. Sincerely,  Nadja Golden  [facility] Cardiology Specialist    [address]  Phone: [phone], Fax: [phone]     I believe that the risk of significant morbidity and mortality related to the patient's current medical conditions are: Intermediate. Approximately 35 minutes were spent during prep work, discussion and exam of the patient, and follow up documentation and all of their questions were answered. The documentation recorded by the scribe, accurately and completely reflects the services I personally performed and the decisions made by me.  Davin Lee Miki Mclean August 25, 2022

## 2022-08-26 ENCOUNTER — TELEPHONE (OUTPATIENT)
Dept: CARDIOLOGY | Age: 87
End: 2022-08-26

## 2022-08-26 NOTE — TELEPHONE ENCOUNTER
----- Message from Jorge Rowell PA-C sent at 8/26/2022  8:11 AM EDT -----  Please notify patient that their lab results are stable and chest XR was unremarkable. Please continue current treatment and follow up.

## 2022-11-16 ENCOUNTER — NURSE ONLY (OUTPATIENT)
Dept: CARDIOLOGY | Age: 87
End: 2022-11-16
Payer: MEDICARE

## 2022-11-16 DIAGNOSIS — Z95.0 PACEMAKER: Primary | ICD-10-CM

## 2022-11-16 DIAGNOSIS — I49.5 TACHY-BRADY SYNDROME (HCC): ICD-10-CM

## 2022-11-16 PROCEDURE — 93288 INTERROG EVL PM/LDLS PM IP: CPT | Performed by: INTERNAL MEDICINE

## 2022-11-16 PROCEDURE — 93283 PRGRMG EVAL IMPLANTABLE DFB: CPT | Performed by: INTERNAL MEDICINE

## 2022-12-04 DIAGNOSIS — I48.21 PERMANENT ATRIAL FIBRILLATION (HCC): Chronic | ICD-10-CM

## 2022-12-04 DIAGNOSIS — I50.32 CHRONIC DIASTOLIC CHF (CONGESTIVE HEART FAILURE) (HCC): Chronic | ICD-10-CM

## 2022-12-04 DIAGNOSIS — E78.2 MIXED HYPERLIPIDEMIA: ICD-10-CM

## 2022-12-04 DIAGNOSIS — Z95.0 PACEMAKER: Chronic | ICD-10-CM

## 2022-12-04 DIAGNOSIS — I49.5 TACHYCARDIA-BRADYCARDIA SYNDROME (HCC): ICD-10-CM

## 2022-12-04 DIAGNOSIS — I25.119 CORONARY ARTERY DISEASE INVOLVING NATIVE CORONARY ARTERY OF NATIVE HEART WITH ANGINA PECTORIS (HCC): Chronic | ICD-10-CM

## 2022-12-05 RX ORDER — APIXABAN 2.5 MG/1
TABLET, FILM COATED ORAL
Qty: 180 TABLET | Refills: 3 | Status: SHIPPED | OUTPATIENT
Start: 2022-12-05

## 2023-01-26 ENCOUNTER — OFFICE VISIT (OUTPATIENT)
Dept: CARDIOLOGY | Age: 88
End: 2023-01-26

## 2023-01-26 VITALS
SYSTOLIC BLOOD PRESSURE: 134 MMHG | WEIGHT: 192.8 LBS | HEIGHT: 67 IN | BODY MASS INDEX: 30.26 KG/M2 | DIASTOLIC BLOOD PRESSURE: 82 MMHG | RESPIRATION RATE: 17 BRPM | OXYGEN SATURATION: 99 % | HEART RATE: 93 BPM

## 2023-01-26 DIAGNOSIS — I25.119 CORONARY ARTERY DISEASE INVOLVING NATIVE CORONARY ARTERY OF NATIVE HEART WITH ANGINA PECTORIS (HCC): ICD-10-CM

## 2023-01-26 DIAGNOSIS — Z95.0 PACEMAKER: ICD-10-CM

## 2023-01-26 DIAGNOSIS — N18.30 TYPE 2 DIABETES MELLITUS WITH STAGE 3 CHRONIC KIDNEY DISEASE, WITHOUT LONG-TERM CURRENT USE OF INSULIN, UNSPECIFIED WHETHER STAGE 3A OR 3B CKD (HCC): ICD-10-CM

## 2023-01-26 DIAGNOSIS — I48.20 CHRONIC A-FIB (HCC): Primary | ICD-10-CM

## 2023-01-26 DIAGNOSIS — Z79.01 CHRONIC ANTICOAGULATION: ICD-10-CM

## 2023-01-26 DIAGNOSIS — I49.5 TACHY-BRADY SYNDROME (HCC): ICD-10-CM

## 2023-01-26 DIAGNOSIS — E11.22 TYPE 2 DIABETES MELLITUS WITH STAGE 3 CHRONIC KIDNEY DISEASE, WITHOUT LONG-TERM CURRENT USE OF INSULIN, UNSPECIFIED WHETHER STAGE 3A OR 3B CKD (HCC): ICD-10-CM

## 2023-01-26 DIAGNOSIS — I10 PRIMARY HYPERTENSION: ICD-10-CM

## 2023-01-26 DIAGNOSIS — E78.2 MIXED HYPERLIPIDEMIA: ICD-10-CM

## 2023-01-26 DIAGNOSIS — Z95.1 S/P CABG (CORONARY ARTERY BYPASS GRAFT): ICD-10-CM

## 2023-01-26 DIAGNOSIS — I50.32 CHRONIC DIASTOLIC CHF (CONGESTIVE HEART FAILURE) (HCC): ICD-10-CM

## 2023-01-26 ASSESSMENT — ENCOUNTER SYMPTOMS
CONSTIPATION: 0
HEARTBURN: 0
DOUBLE VISION: 0
HEMOPTYSIS: 0
PHOTOPHOBIA: 0
BLURRED VISION: 0
DIARRHEA: 0
VOMITING: 0
NAUSEA: 0
ORTHOPNEA: 0
EYE PAIN: 0
SPUTUM PRODUCTION: 0
SHORTNESS OF BREATH: 1
EYE DISCHARGE: 0
BACK PAIN: 1
COUGH: 0
ABDOMINAL PAIN: 0

## 2023-01-26 NOTE — PROGRESS NOTES
Shirley Obrien am scribing for and in the presence of Kaveh Valdez MD, F.A.C.C..    Subjective:     CHIEF COMPLAINT / HPI:   Chief Complaint   Patient presents with    Atrial Fibrillation     Hx:Chr. Afib,ASHD,CHF,HTN,HLD,Dual Pacer,Diabetes,COVID. Echo & Stress in 2018. He has been doing okay. Palpitations at times, notices AFIb at times. Denies: CP, SOB, Lightheaded/dizziness. Dear Dr. Arnel Cotton MD    I had the pleasure of seeing Mr. Rangel for follow-up today. 1-Danny Rangel is 80 y.o. male with extensive cardiac history that include history of myocardial infarction status post bypass surgery in 1995, long-standing history of atrial fibrillation and history of abdominal aortic aneurysm status post repair. 2-Patient has long-standing history of atrial fibrillation that started after his bypass surgery, has been tried on multiple antiarrhythmic medications including sotalol which failed to control his atrial fibrillation, dofetilide 125 mg which has been discontinued at the time of his nephrectomy. He also has been tried on amiodarone but could not tolerate it because of side effect (lung toxicity). 3-Patient was reloaded with Tikosyn, which controlled his atrial fibrillation only partially. Patient was sent to [facility] for ablation but because of his age and his ling history of A Fib, they thought the success rate will not be that high. Decision is made to go for rate control and pacemaker placement. Patient underwent the procedure on 11/07/2016. 4-He was admitted for a low hemoglobin count (5/1/2018- 7.4) anemia secondary to acute blood loss from GI bleeding. His Xarelto is on hold. 5-Echocardiogram: 4/19/2018: EF 55% with moderate mitral regurgitation. 6-Cardiovascular stress test: 4/25/2018: Normal myocardial perfusion.      7-An admission to [address] [place] [address] on 6/17/2018 because of large left-sided pleural effusion and acute on chronic diastolic CHF.  Status post thoracentesis. 8- EKG done in office on 11/23/2020. 9-Pacemaker checked in office on 5/24/2021: Normal pacemaker function. Chronic atrial fibrillation. Ventricular rate is controlled. Expected battery is 4.5 years. 11-Pacer check in office 11/8/2021-normal pacemaker function. Battery is around 4 years. Chronic atrial fibrillation with controlled ventricular response. 12-He is being evaluated at [facility] for cerebral aneurysm. The plan was to do cerebral MRA and MRI but this was not possible because of his non-MRI compatible pacemaker. 13- EKG done today in office 8/25/2022- No acute ischemic changes. 14- Pacemaker check in office 8/25/2022- 3 years left on battery life. Mr. Felix Spicer is here today for his 6 month follow up. He reports he has been doing okay cardiac wise. He does notice his atrial fibrillation at times but this is not bothersome. He walks on his treadmill 5 days a week for at least a half hour to a hour. He does left weight with his upper extremities as well. He reports he has lost 5 lbs recently. He continues to do okay with his urostomy. He denied any chest pain, pressure or tightness. He denies having any shortness of breath or lightheaded/dizziness. No nausea or vomiting. No cough, fever or chills. No abdominal pain, bleeding problems, bowel issues, problems with medications or any other concerns at this time. Past Medical History:     Past Medical History:   Diagnosis Date    Abnormal ultrasound of lower extremity 7/22/15    Moderate to severe bilateral,multifocal,arterial insufficiency in  both legs. Minimal inflow component. Claudication present during exercise componet with worsenind NIVIA on the left post-exercise.     Atrial fibrillation [facility])     s/p pacemaker placement due to failure of multiple rhythm control strategies    Bladder cancer (HCC)     CAD (coronary artery disease)     Dyspnea on exertion 5/7/2019    H/O echocardiogram 04/19/2018    EF 55%. LV cavity size is within normal limits,LV wall thickness is mildly increased. No definite specific wall motion abnormalities were identified. Mitral annular calcification. Myxomatous thickening of the mitral leaflets. Moderate mitral regurg. rhythm of what appeared to be atrial fib. H/O total cystectomy 2/5/2019    For bladder CA in 2006, has an ileal conduit    History of abdominal aortic aneurysm (AAA) 2/5/2019    S/p repair 1993     History of bleeding ulcers     duodenal    History of Holter monitoring 9/21/15    atrial fibrillation/flutter throughout with an average HR of 67 bpm. 78 pauses >2 seconds and 1 pause >3 seconds,but all occured during typical hours of sleep. History of Holter monitoring 5/27/16    Paroxysmal atrial fib with rapid ventricular response (A-Fib 38% of the time). Occasional isolated PVC's    History of Holter monitoring 08/09/2016    Atrial Fibrillation throughout with heart rates ranging between  bpm.  Four 2 second pauses. Occasional PVC's    History of stress test 04/25/2018    Normal. LV systolic function cannot be assessed because of no gating. ECG is uninterpretable at baseline because of demand ventricular pacing. Low risk for significant CAD.     Hypertension     Pacemaker 11/07/2016    [employer]     Polymyalgia rheumatica ([address]      Past Surgical History:  Past Surgical History:   Procedure Laterality Date    [address] [facility] SURGERY      CATARACT REMOVAL Bilateral     CHOLECYSTECTOMY      CORONARY ARTERY BYPASS GRAFT  1995    x3 [facility]    KIDNEY REMOVAL Left 2015    [facility]    PACEMAKER INSERTION      PROSTATECTOMY      TONSILLECTOMY AND ADENOIDECTOMY       Social History:    Social History     Tobacco Use   Smoking Status Former    Packs/day: 1.00    Years: 50.00    Pack years: 50.00    Types: Cigarettes, Pipe    Quit date: 2006 Years since quittin.0   Smokeless Tobacco Never     Family history reviewed, noncontributory. Patient with known history of CAD, CHF, A. fib and pacemaker. Current Medications:  Outpatient Medications Marked as Taking for the 23 encounter (Office Visit) with Estephanie Meade MD   Medication Sig Dispense Refill    ELIQUIS 2.5 MG TABS tablet TAKE ONE TABLET BY MOUTH TWICE A  tablet 3    spironolactone (ALDACTONE) 25 MG tablet Take 0.5 tablets by mouth daily 45 tablet 3    torsemide (DEMADEX) 20 MG tablet Take 20 mg on mon wed and fri Take one half tablet on sun, tu,thurs and sat 90 tablet 3    vitamin C (ASCORBIC ACID) 500 MG tablet Take 500 mg by mouth daily      zinc gluconate 50 MG tablet Take 50 mg by mouth daily      metoprolol succinate (TOPROL XL) 100 MG extended release tablet TAKE ONE TABLET BY MOUTH TWICE A  tablet 3    umeclidinium-vilanterol (ANORO ELLIPTA) 62.5-25 MCG/INH AEPB inhaler Inhale 1 puff into the lungs daily 3 each 3    NONFORMULARY 1 capsule daily Pt takes Balance of Nutrition daily as 3 of Vegetables Capsules and 3 of the fruit capsules. glimepiride (AMARYL) 2 MG tablet Take 2 mg by mouth 2 times daily       Coenzyme Q10 (CO Q-10) 400 MG CAPS Take 100 mg by mouth 2 times daily       simvastatin (ZOCOR) 20 MG tablet Take 20 mg by mouth nightly       Multiple Vitamins-Minerals (CENTRUM SILVER) TABS Take 1 tablet by mouth daily         Review of Systems   Constitutional:  Negative for chills, diaphoresis, fever, malaise/fatigue and weight loss. HENT:  Negative for ear pain, hearing loss, nosebleeds and tinnitus. Eyes:  Negative for blurred vision, double vision, photophobia, pain and discharge. Respiratory:  Positive for shortness of breath. Negative for cough, hemoptysis and sputum production. Breathing is better. Shortness of breath on exertion.    Cardiovascular:  Negative for chest pain, palpitations, orthopnea, claudication, leg swelling and PND.   Gastrointestinal:  Negative for abdominal pain, constipation, diarrhea, heartburn, nausea and vomiting. Genitourinary: Negative. Musculoskeletal:  Positive for back pain and joint pain. Negative for falls, myalgias and neck pain (.pt). Skin:  Negative for itching and rash. Neurological:  Negative for dizziness, speech change, focal weakness, loss of consciousness, weakness and headaches. Endo/Heme/Allergies:  Negative for environmental allergies and polydipsia. Does not bruise/bleed easily. Psychiatric/Behavioral: Negative. Objective:     Physical Examination:     /82 (Site: Left Upper Arm, Position: Sitting, Cuff Size: Medium Adult)   Pulse 93   Resp 17   Ht 5' 7\" (1.702 m)   Wt 192 lb 12.8 oz (87.5 kg)   SpO2 99%   BMI 30.20 kg/m²  Body mass index is 30.2 kg/m². Constitutional: He appeared oriented to person and place. He appears well-developed and well-nourished. In no acute distress. HEENT: Normocephalic and atraumatic. No JVD present. Carotid bruit is not present. No mass and no thyromegaly present. No lymphadenopathy noted. Cardiovascular: Normal rate, irregularly irregular rhythm, normal heart sounds. Exam reveals no gallop and no friction rubs. 5/6 systolic murmur, 5th intercostal space on the LEFT in the mid-clavicular line (cardiac apex)  Pulmonary/Chest: Effort normal and breath sounds normal. No respiratory distress. He has no wheezes, rhonchi or rales. Abdominal: Soft, non-tender. He exhibits no organomegaly, mass or bruit. + Urostomy   Extremities: Trace. No cyanosis or clubbing. 2+ radial and carotid pulses. Distal extremity pulses: 2+ bilaterally. Neurological: Alertness and orientation as per Constitutional exam. No evidence of gross cranial nerve deficit. Coordination appeared normal.   Skin: Skin is warm and dry. There is no rash or diaphoresis. Psychiatric: He has a normal mood and affect.  His speech is normal and behavior is normal.        DATA: Lab Results   Component Value Date    CREATININE 1.58 (H) 08/25/2022    BUN 32 (H) 08/25/2022     08/25/2022    K 4.5 08/25/2022     08/25/2022    CO2 28 08/25/2022       Assessment:      Diagnosis Orders   1. Chronic a-fib ([facility]        2. Chronic anticoagulation        3. Coronary artery disease involving native coronary artery of native heart with angina pectoris ([facility]        4. S/P CABG (coronary artery bypass graft)        5. Chronic diastolic CHF (congestive heart failure) ([facility]        6. Primary hypertension        7. Mixed hyperlipidemia        8. Pacemaker        9. 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 Type 2 diabetes mellitus with stage 3 chronic kidney disease, without long-term current use of insulin, unspecified whether stage 3a or 3b CKD ([facility]          Plan:     Chronic Atrial Fibrillation: Rate Control S/P pacemaker placement on 11/07/2016  Beta Blocker: Continue metoprolol succinate (Toprol XL) 100 mg 2 times daily  [id number] Score for Atrial Fibrillation Stroke Risk   Risk   Factors  Component Value   C CHF Yes 1   H HTN Yes 1   A2 Age >= 76 Yes,  (80 y.o.) 2   D DM Yes 1   S2 Prior Stroke/TIA No 0   V Vascular Disease No 0   A Age 74-69 No,  (80 y.o.) 0   Sc Sex male 0    [id number]  Score  5   Score last updated 7/59/47 1:37 PM EST  Click here for a link to the UpToDate guideline \"Atrial Fibrillation: Anticoagulation therapy to prevent embolization  Disclaimer: Risk Score calculation is dependent on accuracy of patient problem list and past encounter diagnosis. Stroke Risk: His CHADS2-VASc score is 5/9 (6.7% stroke risk)  Anticoagulation: Continue Apixaban (Eliquis) 2.5 mg every 12 hours. Dose appropriate, age 80 and creatinine >1.5 mg/dL. Atherosclerotic Heart Disease: History of CABG in 1995   Antiplatelet Agent: Not indicated at this time. He is currently on Eliquis as above.    Diuretics: Continue torsemide (Demodex) as directed by nephrology  Diuretics: Continue Spironolactone (Aldactone) 25 mg, 1/2 tab daily. Beta Blocker: Continue metoprolol succinate (Toprol XL) 100 mg 2 times daily  Cholesterol Reduction Therapy: Continue simvastatin (Zocor) 20 mg daily. Chronic Diastolic Heart Failure: EF 55% on 4/2018  Beta Blocker: Continue metoprolol succinate (Toprol XL) 100 mg 2 times daily   Diuretics: Continue torsemide (Demodex) as directed by nephrology     Diuretics: Continue Spironolactone (Aldactone) 25 mg, 1/2 tab daily. Essential Hypertension: Controlled  Beta Blocker: Continue metoprolol succinate (Toprol XL) 100 mg 2 times daily   Diuretics: Continue torsemide (Demodex) as directed by nephrology     Diuretics: Continue Spironolactone (Aldactone) 25 mg, 1/2 tab daily. Hyperlipidemia: Mixed  Cholesterol Reduction Therapy: Continue simvastatin (Zocor) 20 mg daily. Dual Chamber Pacemaker:  Indication for Device Placement: Tachycardia-Bradycardia Syndrome   Interrogation Findings: I reviewed the results of their most recent home interrogation from 11/16/2022. Diabetes: Continue follow up with Dr. Rose White MD , his last Hemoglobin was done on 1/24/2022 and it was 7.6%. Finally, I recommended that he continue his other medications and follow up with you as previously scheduled. FOLLOW UP:  I told Mr. Rangel to call my office if he had any problems, but otherwise told him to Return in about 6 months (around 7/26/2023). However, I would be happy to see him sooner should the need arise. Adam Yeh MD, MS, F.A.C.C. [facility]) [facility], [address] [place] [address]  Phone: [phone], Fax: [phone]    I believe that the risk of significant morbidity and mortality related to the patient's current medical conditions are: Intermediate.  Approximately 35 minutes were spent during prep work, discussion and exam of the patient, and follow up documentation and all of their questions were answered. The documentation recorded by the scribe, accurately and completely reflects the services I personally performed and the decisions made by me. Tiffanie Hooper MD, F.A.C.C.  January 26, 2023

## 2023-01-26 NOTE — PATIENT INSTRUCTIONS
SURVEY:    You may be receiving a survey from [employer] regarding your visit today. Please complete the survey to enable us to provide the highest quality of care to you and your family. If you cannot score us a very good on any question, please call the office to discuss how we could have made your experience a very good one. Thank you.

## 2023-02-01 NOTE — PROGRESS NOTES
Bryant Landin am scribing for and in the presence of Demian Calvo MD, F.A.C.C..    Subjective:     CHIEF COMPLAINT / HPI:   Chief Complaint   Patient presents with    Follow-up     ER on 6/17 for CP. Hx: AFib, Pacer, CAD, Hyperlipidemia, CHF, CKD. SOB w/ exertion. He is feeling better. Denies: CP,lightheaded/dizziness, palpitations. Dear Dr. Jefry Lopez MD    I had the pleasure of seeing Mr. Rangel for follow-up today. 1-Danny Rangel is 80 y.o. male with extensive cardiac history that include history of myocardial infarction status post bypass surgery in 1995, long-standing history of atrial fibrillation and history of abdominal aortic aneurysm status post repair. 2-Patient has long-standing history of atrial fibrillation that started after his bypass surgery, has been tried on multiple antiarrhythmic medications including sotalol which failed to control his atrial fibrillation, dofetilide 125 mg which has been discontinued at the time of his nephrectomy. He also has been tried on amiodarone but could not tolerate it because of side effect (lung toxicity). 3-Patient was reloaded with Tikosyn, which controlled his atrial fibrillation only partially. Patient was sent to [facility] for ablation but because of his age and his ling history of A Fib, they thought the success rate will not be that high. Decision is made to go for rate control and pacemaker placement. Patient underwent the procedure on 11/07/2016. 4-He was admitted for a low hemoglobin count (5/1/2018- 7.4) anemia secondary to acute blood loss from GI bleeding. His Xarelto is on hold. 5-Echocardiogram: 4/19/2018: EF 55% with moderate mitral regurgitation. 6-Cardiovascular stress test: 4/25/2018: Normal myocardial perfusion. Pacemaker Interrogation on 10/24/18: Chronic atrial fibrillation with controlled ventricular response.   Baseline pacing rate increased to 70 bpm.     EKG done on 10/24/18- Atrial  albuterol sulfate  (90 Base) MCG/ACT inhaler Inhale 2 puffs into the lungs 4 times daily as needed for Wheezing 1 Inhaler 0    albuterol (PROVENTIL) (2.5 MG/3ML) 0.083% nebulizer solution Take 3 mLs by nebulization every 6 hours as needed for Wheezing or Shortness of Breath 120 each 3    aspirin 81 MG EC tablet Take 81 mg by mouth daily      metoprolol succinate (TOPROL XL) 100 MG extended release tablet Take 1 tablet by mouth 2 times daily 180 tablet 3    acetaminophen-codeine (TYLENOL #3) 300-30 MG per tablet TAKE ONE TABLET BY MOUTH EVERY 6 HOURS AS NEEDED FOR 1 WEEK  0    glimepiride (AMARYL) 2 MG tablet Take 2 mg by mouth daily       Coenzyme Q10 (CO Q-10) 400 MG CAPS Take 400 mg by mouth 2 times daily       simvastatin (ZOCOR) 40 MG tablet Take 20 mg by mouth nightly       pioglitazone (ACTOS) 30 MG tablet Take 30 mg by mouth daily      Multiple Vitamins-Minerals (CENTRUM SILVER) TABS Take 1 tablet by mouth daily          Review of Systems   Constitutional: Positive for malaise/fatigue. Negative for chills, diaphoresis, fever and weight loss. HENT: Negative for ear pain, hearing loss, nosebleeds and tinnitus. Eyes: Negative for blurred vision, double vision, photophobia, pain and discharge. Respiratory: Positive for shortness of breath. Negative for cough, hemoptysis and sputum production. Breathing is better. Shortness of breath on exertion. Cardiovascular: Negative for chest pain, palpitations, orthopnea, claudication, leg swelling and PND. Gastrointestinal: Negative for abdominal pain, constipation, diarrhea, heartburn, nausea and vomiting. Genitourinary: Negative. Musculoskeletal: Positive for back pain and joint pain. Negative for falls, myalgias and neck pain. Skin: Negative for itching and rash. Neurological: Negative for dizziness, speech change, focal weakness, loss of consciousness, weakness and headaches.    Endo/Heme/Allergies: Negative for Stroke Risk: His CHADS2-VASc score is greater than 2 (2.2% stroke risk)  · Anticoagulation: Unable to tolerate  Xarelto due to history of significant GI bleeding requiring transfusion  He tried it one more time and he has hematuria with it. Atherosclerotic Heart Disease: History of CABG in 1995   Antiplatelet Agent: Continue Aspirin 81 mg daily. I also reminded him to watch for signs of blood in his stool or black tarry stools and stop the medication immediately if this develops as this could be life threatening. Diuretics: Continue Demadex  Needing doses of 20 and 10 mg as directed by nephrology. Beta Blocker: Continue metoprolol succinate (Toprol XL)     Statin Therapy: Continue simvastatin (Zocor) 20 mg nightly. Chronic Diastolic Heart Failure: EF 55% on 4/2018  Beta Blocker: Continue Metorpolol       Diuretics: Continue Demadex      History of chronic kidney disease, single kidney, follow-up with nephrology as previously scheduled. · Hyperlipidemia: Mixed  · Statin Therapy: Continue simvastatin (Zocor) 20 mg nightly. · Anemia:   · Last Hemoglobin 13.6 as of 6/24/2019.  Dual Chamber Pacemaker:   Indication for Device Placement: Tachycardia-Bradycardia Syndrome   Interrogation Findings: I reviewed the results of their most recent home interrogation from Today. Normal pacemaker function. Chronic atrial fibrillation with controlled ventricular rate. Battery life 7 years. Finally, I recommended that he continue his other medications and follow up with you as previously scheduled. FOLLOW UP:  I told Mr. Rangel to call my office if he had any problems, but otherwise told him to Return in about 1 month (around 7/23/2019). However, I would be happy to see him sooner should the need arise. Sincerely,  Isiah Mike MD, F.A.C.C.   [facility]    [address], [address] [place] [address]  Phone: [phone], Fax: [phone]     I believe that the risk of significant morbidity and mortality related to the patient's current medical conditions are: Intermediate. 25 minutes were spent with the patient and all of his questions were answered. The documentation recorded by the scribe, accurately and completely reflects the services I personally performed and the decisions made by me. Demian Calvo MD, F.A.C.C.  June 25, 2019 done

## 2023-02-15 ENCOUNTER — OFFICE VISIT (OUTPATIENT)
Dept: PULMONOLOGY | Age: 88
End: 2023-02-15
Payer: MEDICARE

## 2023-02-15 VITALS
WEIGHT: 199 LBS | DIASTOLIC BLOOD PRESSURE: 77 MMHG | TEMPERATURE: 96.8 F | RESPIRATION RATE: 16 BRPM | OXYGEN SATURATION: 97 % | HEART RATE: 98 BPM | BODY MASS INDEX: 31.23 KG/M2 | SYSTOLIC BLOOD PRESSURE: 129 MMHG | HEIGHT: 67 IN

## 2023-02-15 DIAGNOSIS — J44.9 STAGE 1 MILD COPD BY GOLD CLASSIFICATION (HCC): Primary | ICD-10-CM

## 2023-02-15 DIAGNOSIS — M89.9 FRONTAL SKULL LESION: ICD-10-CM

## 2023-02-15 PROCEDURE — G8428 CUR MEDS NOT DOCUMENT: HCPCS | Performed by: INTERNAL MEDICINE

## 2023-02-15 PROCEDURE — G8417 CALC BMI ABV UP PARAM F/U: HCPCS | Performed by: INTERNAL MEDICINE

## 2023-02-15 PROCEDURE — 1123F ACP DISCUSS/DSCN MKR DOCD: CPT | Performed by: INTERNAL MEDICINE

## 2023-02-15 PROCEDURE — 1036F TOBACCO NON-USER: CPT | Performed by: INTERNAL MEDICINE

## 2023-02-15 PROCEDURE — G8484 FLU IMMUNIZE NO ADMIN: HCPCS | Performed by: INTERNAL MEDICINE

## 2023-02-15 PROCEDURE — 99214 OFFICE O/P EST MOD 30 MIN: CPT | Performed by: INTERNAL MEDICINE

## 2023-02-15 PROCEDURE — 99213 OFFICE O/P EST LOW 20 MIN: CPT | Performed by: INTERNAL MEDICINE

## 2023-02-15 PROCEDURE — 3023F SPIROM DOC REV: CPT | Performed by: INTERNAL MEDICINE

## 2023-02-15 RX ORDER — UMECLIDINIUM BROMIDE AND VILANTEROL TRIFENATATE 62.5; 25 UG/1; UG/1
1 POWDER RESPIRATORY (INHALATION) DAILY
Qty: 3 EACH | Refills: 3 | Status: SHIPPED | OUTPATIENT
Start: 2023-02-15

## 2023-02-15 ASSESSMENT — ENCOUNTER SYMPTOMS
EYES NEGATIVE: 1
RESPIRATORY NEGATIVE: 1

## 2023-02-15 NOTE — PATIENT INSTRUCTIONS
SURVEY:    You may be receiving a survey from [employer] regarding your visit today. Please complete the survey to enable us to provide the highest quality of care to you and your family. If you cannot score us a very good on any question, please call the office to discuss how we could have made your experience a very good one. Thank you.

## 2023-02-15 NOTE — PROGRESS NOTES
Subjective:      Patient ID: Heather Groves is a 80 y.o. male being seen in my clinic for   Chief Complaint   Patient presents with    COPD     F/U VISIT. Gracia Patrick DENIES ANY RESP. PROBLEMS COVID+11/22       HPI  Follow-up visit for stage I COPD currently controlled on Anoro. Since his last visit a year ago he remains largely asymptomatic. Become short of breath only climbing 3 flights of stairs. Vigorous. Exercises every day. No recent upper respiratory tract infections. Had a mild case of COVID last fall and received monoclonal antibody infusion. Not serious disease. Up-to-date with all of his vaccinations. No new health problems. Review of Systems   Constitutional: Negative. HENT: Negative. Eyes: Negative. Respiratory: Negative. Cardiovascular: Negative. All other systems reviewed and are negative. Objective:     Vitals:    02/15/23 1304   BP: 129/77   Pulse: 98   Resp: 16   Temp: 96.8 °F (36 °C)   SpO2: 97%   Weight: 199 lb (90.3 kg)   Height: 5' 7\" (1.702 m)     Current Outpatient Medications   Medication Sig Dispense Refill    umeclidinium-vilanterol (ANORO ELLIPTA) 62.5-25 MCG/ACT inhaler Inhale 1 puff into the lungs daily 3 each 3    ELIQUIS 2.5 MG TABS tablet TAKE ONE TABLET BY MOUTH TWICE A  tablet 3    spironolactone (ALDACTONE) 25 MG tablet Take 0.5 tablets by mouth daily 45 tablet 3    torsemide (DEMADEX) 20 MG tablet Take 20 mg on mon wed and fri Take one half tablet on sun, tues,thurs and sat 90 tablet 3    vitamin C (ASCORBIC ACID) 500 MG tablet Take 500 mg by mouth daily      zinc gluconate 50 MG tablet Take 50 mg by mouth daily      metoprolol succinate (TOPROL XL) 100 MG extended release tablet TAKE ONE TABLET BY MOUTH TWICE A  tablet 3    NONFORMULARY 1 capsule daily Pt takes Balance of Nutrition daily as 3 of Vegetables Capsules and 3 of the fruit capsules.       glimepiride (AMARYL) 2 MG tablet Take 2 mg by mouth 2 times daily       Coenzyme Q10 (CO Q-10) 400 MG CAPS Take 100 mg by mouth 2 times daily       simvastatin (ZOCOR) 20 MG tablet Take 20 mg by mouth nightly       Multiple Vitamins-Minerals (CENTRUM SILVER) TABS Take 1 tablet by mouth daily      albuterol (PROVENTIL) (2.5 MG/3ML) 0.083% nebulizer solution Take 3 mLs by nebulization every 6 hours as needed for Wheezing or Shortness of Breath (Patient not taking: No sig reported) 120 each 3     No current facility-administered medications for this visit. Physical Exam  Vitals and nursing note reviewed. Constitutional:       Appearance: He is well-developed. HENT:      Nose: Nose normal. No congestion. Mouth/Throat:      Mouth: Mucous membranes are moist.      Pharynx: Oropharynx is clear. No oropharyngeal exudate. Eyes:      General: No scleral icterus. Conjunctiva/sclera: Conjunctivae normal.   Neck:      Thyroid: No thyromegaly. Vascular: No JVD. Trachea: No tracheal deviation. Cardiovascular:      Rate and Rhythm: Normal rate and regular rhythm. Heart sounds: Normal heart sounds. No murmur heard. No gallop. Pulmonary:      Effort: Pulmonary effort is normal. No respiratory distress. Breath sounds: No wheezing or rales. Chest:      Chest wall: No tenderness. Abdominal:      Palpations: Abdomen is soft. Tenderness: There is no abdominal tenderness. Musculoskeletal:      Cervical back: Neck supple. Right lower leg: No edema. Left lower leg: No edema. Comments: No clubbing   Lymphadenopathy:      Cervical: No cervical adenopathy. Skin:     General: Skin is warm and dry. Neurological:      Mental Status: He is alert and oriented to person, place, and time.      Wt Readings from Last 3 Encounters:   02/15/23 199 lb (90.3 kg)   01/26/23 192 lb 12.8 oz (87.5 kg)   08/25/22 193 lb 9.6 oz (87.8 kg)     Results for orders placed or performed during the hospital encounter of 08/25/22   Brain Natriuretic Peptide   Result Value Ref Range Pro-BNP 1,604 (H) <300 pg/mL   Basic Metabolic Panel   Result Value Ref Range    Glucose 262 (H) 70 - 99 mg/dL    BUN 32 (H) 8 - 23 mg/dL    Creatinine 1.58 (H) 0.70 - 1.20 mg/dL    Bun/Cre Ratio 20 9 - 20    Calcium 9.9 8.6 - 10.4 mg/dL    Sodium 138 135 - 144 mmol/L    Potassium 4.5 3.7 - 5.3 mmol/L    Chloride 100 98 - 107 mmol/L    CO2 28 20 - 31 mmol/L    Anion Gap 10 9 - 17 mmol/L    GFR Non-African American 41 (L) >60 mL/min    GFR African American 50 (L) >60 mL/min    GFR Comment          GFR Staging         CBC   Result Value Ref Range    WBC 7.1 3.5 - 11.3 k/uL    RBC 5.38 4.21 - 5.77 m/uL    Hemoglobin 15.5 13.0 - 17.0 g/dL    Hematocrit 48.1 40.7 - 50.3 %    MCV 89.4 82.6 - 102.9 fL    MCH 28.8 25.2 - 33.5 pg    MCHC 32.2 28.4 - 34.8 g/dL    RDW 12.9 11.8 - 14.4 %    Platelets 511 852 - 354 k/uL    MPV 9.3 8.1 - 13.5 fL    NRBC Automated 0.0 0.0 per 100 WBC       :      1. Stage 1 mild COPD by GOLD classification ([facility] [address]    2.  Frontal skull lesion      Patient Active Problem List   Diagnosis    Coronary artery disease involving native coronary artery of native heart with angina pectoris (HCC)    S/P CABG (coronary artery bypass graft)    Arteriosclerotic retinopathy    Chronic kidney disease, stage 3 (HCC)    Type 2 diabetes mellitus (HCC)    Chronic diastolic CHF (congestive heart failure) ([address]    Essential hypertension    Solitary right kidney    Malignant neoplasm of urinary bladder (HCC)    Anemia due to blood loss, acute    Atrial fibrillation (HCC)    CAD (coronary artery disease)    Pacemaker    Polymyalgia rheumatica (HCC)    History of bleeding ulcers    GI bleed    Hemorrhagic disorder due to extrinsic circulating anticoagulants ([address]    H/O total cystectomy    History of abdominal aortic aneurysm (AAA)    Dyspnea on exertion    Pleural effusion on left    Iron deficiency anemia due to chronic blood loss    Tachycardia-bradycardia syndrome ([address]         Plan:      Refilled bronchodilators. Discussed strategies for management of COPD. Flu shot, pneumonia vaccine, and COVID booster per schedule. Return in 1 year. Orders Placed This Encounter   Medications    umeclidinium-vilanterol (ANORO ELLIPTA) 62.5-25 MCG/ACT inhaler     Sig: Inhale 1 puff into the lungs daily     Dispense:  3 each     Refill:  3     No orders of the defined types were placed in this encounter. Return in about 1 year (around 2/15/2024).        Electronically signed by Vivi Mckeon DO on 2/15/2023at 1:47 PM

## 2023-02-28 RX ORDER — METOPROLOL SUCCINATE 100 MG/1
TABLET, EXTENDED RELEASE ORAL
Qty: 180 TABLET | Refills: 3 | Status: SHIPPED | OUTPATIENT
Start: 2023-02-28

## 2023-05-09 ENCOUNTER — NURSE ONLY (OUTPATIENT)
Dept: CARDIOLOGY | Age: 88
End: 2023-05-09
Payer: MEDICARE

## 2023-05-09 DIAGNOSIS — Z95.0 PACEMAKER: ICD-10-CM

## 2023-05-09 DIAGNOSIS — I49.5 TACHYCARDIA-BRADYCARDIA SYNDROME (HCC): Primary | ICD-10-CM

## 2023-05-09 PROCEDURE — 93294 REM INTERROG EVL PM/LDLS PM: CPT | Performed by: INTERNAL MEDICINE

## 2023-08-07 ENCOUNTER — HOSPITAL ENCOUNTER (OUTPATIENT)
Age: 88
Discharge: HOME | End: 2023-08-07
Payer: MEDICARE

## 2023-08-07 ENCOUNTER — HOSPITAL ENCOUNTER (OUTPATIENT)
Dept: CT IMAGING | Age: 88
Discharge: HOME | End: 2023-08-09
Payer: MEDICARE

## 2023-08-07 ENCOUNTER — OFFICE VISIT (OUTPATIENT)
Dept: CARDIOLOGY | Age: 88
End: 2023-08-07
Payer: MEDICARE

## 2023-08-07 VITALS
DIASTOLIC BLOOD PRESSURE: 78 MMHG | RESPIRATION RATE: 18 BRPM | HEART RATE: 78 BPM | OXYGEN SATURATION: 98 % | BODY MASS INDEX: 28.82 KG/M2 | WEIGHT: 183.6 LBS | SYSTOLIC BLOOD PRESSURE: 138 MMHG | HEIGHT: 67 IN

## 2023-08-07 DIAGNOSIS — E78.2 MIXED HYPERLIPIDEMIA: ICD-10-CM

## 2023-08-07 DIAGNOSIS — I50.32 CHRONIC DIASTOLIC CHF (CONGESTIVE HEART FAILURE) (HCC): ICD-10-CM

## 2023-08-07 DIAGNOSIS — Z95.0 PACEMAKER: ICD-10-CM

## 2023-08-07 DIAGNOSIS — I10 PRIMARY HYPERTENSION: ICD-10-CM

## 2023-08-07 DIAGNOSIS — Z79.01 CHRONIC ANTICOAGULATION: ICD-10-CM

## 2023-08-07 DIAGNOSIS — Z95.1 S/P CABG (CORONARY ARTERY BYPASS GRAFT): ICD-10-CM

## 2023-08-07 DIAGNOSIS — I48.20 CHRONIC A-FIB (HCC): ICD-10-CM

## 2023-08-07 DIAGNOSIS — I49.5 TACHY-BRADY SYNDROME (HCC): ICD-10-CM

## 2023-08-07 DIAGNOSIS — I73.9 PVD (PERIPHERAL VASCULAR DISEASE) (HCC): ICD-10-CM

## 2023-08-07 DIAGNOSIS — R19.09 PULSATILE GROIN MASS: ICD-10-CM

## 2023-08-07 DIAGNOSIS — I48.20 CHRONIC A-FIB (HCC): Primary | ICD-10-CM

## 2023-08-07 LAB
ANION GAP SERPL CALCULATED.3IONS-SCNC: 10 MMOL/L (ref 9–17)
BUN SERPL-MCNC: 29 MG/DL (ref 8–23)
BUN/CREAT SERPL: 19 (ref 9–20)
CALCIUM SERPL-MCNC: 10 MG/DL (ref 8.6–10.4)
CHLORIDE SERPL-SCNC: 96 MMOL/L (ref 98–107)
CO2 SERPL-SCNC: 30 MMOL/L (ref 20–31)
CREAT SERPL-MCNC: 1.5 MG/DL (ref 0.7–1.2)
GFR SERPL CREATININE-BSD FRML MDRD: 44 ML/MIN/1.73M2
GLUCOSE SERPL-MCNC: 171 MG/DL (ref 70–99)
POTASSIUM SERPL-SCNC: 4.6 MMOL/L (ref 3.7–5.3)
SODIUM SERPL-SCNC: 136 MMOL/L (ref 135–144)

## 2023-08-07 PROCEDURE — 93288 INTERROG EVL PM/LDLS PM IP: CPT | Performed by: INTERNAL MEDICINE

## 2023-08-07 PROCEDURE — G8427 DOCREV CUR MEDS BY ELIG CLIN: HCPCS | Performed by: INTERNAL MEDICINE

## 2023-08-07 PROCEDURE — 6360000004 HC RX CONTRAST MEDICATION: Performed by: INTERNAL MEDICINE

## 2023-08-07 PROCEDURE — 36415 COLL VENOUS BLD VENIPUNCTURE: CPT

## 2023-08-07 PROCEDURE — 1123F ACP DISCUSS/DSCN MKR DOCD: CPT | Performed by: INTERNAL MEDICINE

## 2023-08-07 PROCEDURE — 99215 OFFICE O/P EST HI 40 MIN: CPT | Performed by: INTERNAL MEDICINE

## 2023-08-07 PROCEDURE — 75635 CT ANGIO ABDOMINAL ARTERIES: CPT

## 2023-08-07 PROCEDURE — G8417 CALC BMI ABV UP PARAM F/U: HCPCS | Performed by: INTERNAL MEDICINE

## 2023-08-07 PROCEDURE — 1036F TOBACCO NON-USER: CPT | Performed by: INTERNAL MEDICINE

## 2023-08-07 PROCEDURE — 80048 BASIC METABOLIC PNL TOTAL CA: CPT

## 2023-08-07 PROCEDURE — 93005 ELECTROCARDIOGRAM TRACING: CPT | Performed by: INTERNAL MEDICINE

## 2023-08-07 RX ADMIN — IOPAMIDOL 85 ML: 755 INJECTION, SOLUTION INTRAVENOUS at 15:07

## 2023-08-07 ASSESSMENT — ENCOUNTER SYMPTOMS
SPUTUM PRODUCTION: 0
HEARTBURN: 0
DOUBLE VISION: 0
PHOTOPHOBIA: 0
CONSTIPATION: 0
COUGH: 0
HEMOPTYSIS: 0
ORTHOPNEA: 0
BLURRED VISION: 0
EYE PAIN: 0
EYE DISCHARGE: 0
BACK PAIN: 1
ABDOMINAL PAIN: 0
DIARRHEA: 0
NAUSEA: 0
VOMITING: 0
SHORTNESS OF BREATH: 1

## 2023-08-07 NOTE — PROGRESS NOTES
simvastatin (Zocor) 20 mg daily. Dual Chamber Pacemaker:  Indication for Device Placement: Tachycardia-Bradycardia Syndrome   Interrogation Findings: Within normal limits and therefore no changes were made. Diabetes: Continue follow up with Dr. Aretha Moore MD     Pulsating right groin mass: Patient has history of aortobifemoral bypass. He noticed expanding mass at the right groin. 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 CT of Abdominal aorta with contrast: I discussed with the patient over the phone extensively the results of the CT scan of the aorta and bilateral lower extremities. 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 There is also infiltrative process in the liver that may suggest malignancy. Patient wants to go to the [facility] for evaluation. I told him these results are critical enough for him to go to the emergency room. I offered him to come to the emergency room in the event and we can transfer him to the [facility] but patient said he will like to go to the [facility] emergency room directly. Patient said he will come to our hospital in the morning to get a CD of the  scan and he will have someone from his family to drive him to the [address] [place] [address]. Finally, I recommended that he continue his other medications and follow up with you as previously scheduled. FOLLOW UP:  I told Mr. Rangel to call my office if he had any problems, but otherwise told him to Return in about 6 months (around 2/7/2024) for Follow up. However, I would be happy to see him sooner should the need arise. Julius Fraser MD, MS, F.A.C.C. [facility], [address], [email or web address] [place], [address]  Phone: [phone], Fax: [phone]    I believe that the risk of significant morbidity and mortality related to the patient's current medical conditions are: high.  Approximately 45 minutes

## 2023-08-07 NOTE — PATIENT INSTRUCTIONS
SURVEY:    You may be receiving a survey from [employer] regarding your visit today. Please complete the survey to enable us to provide the highest quality of care to you and your family. If you cannot score us a very good on any question, please call the office to discuss how we could have made your experience a very good one. Thank you.

## 2023-10-03 PROBLEM — N13.30 HYDRONEPHROSIS OF RIGHT KIDNEY: Status: ACTIVE | Noted: 2023-10-03

## 2023-10-03 PROBLEM — Z98.890 HISTORY OF ILEAL CONDUIT: Status: ACTIVE | Noted: 2019-02-05

## 2023-10-05 ENCOUNTER — HOSPITAL ENCOUNTER (OUTPATIENT)
Age: 88
Discharge: HOME | End: 2023-10-05
Payer: MEDICARE

## 2023-10-05 DIAGNOSIS — N18.32 TYPE 2 DIABETES MELLITUS WITH STAGE 3B CHRONIC KIDNEY DISEASE, WITHOUT LONG-TERM CURRENT USE OF INSULIN (HCC): ICD-10-CM

## 2023-10-05 DIAGNOSIS — I10 ESSENTIAL HYPERTENSION: ICD-10-CM

## 2023-10-05 DIAGNOSIS — N18.32 STAGE 3B CHRONIC KIDNEY DISEASE (HCC): ICD-10-CM

## 2023-10-05 DIAGNOSIS — E11.22 TYPE 2 DIABETES MELLITUS WITH STAGE 3B CHRONIC KIDNEY DISEASE, WITHOUT LONG-TERM CURRENT USE OF INSULIN (HCC): ICD-10-CM

## 2023-10-05 LAB
ALBUMIN SERPL-MCNC: 4.3 G/DL (ref 3.5–5.2)
ANION GAP SERPL CALCULATED.3IONS-SCNC: 11 MMOL/L (ref 9–17)
BUN SERPL-MCNC: 37 MG/DL (ref 8–23)
BUN/CREAT SERPL: 25 (ref 9–20)
CALCIUM SERPL-MCNC: 9.7 MG/DL (ref 8.6–10.4)
CHLORIDE SERPL-SCNC: 101 MMOL/L (ref 98–107)
CO2 SERPL-SCNC: 30 MMOL/L (ref 20–31)
CREAT SERPL-MCNC: 1.5 MG/DL (ref 0.7–1.2)
CREAT UR-MCNC: 55.6 MG/DL (ref 39–259)
ERYTHROCYTE [DISTWIDTH] IN BLOOD BY AUTOMATED COUNT: 14.1 % (ref 11.8–14.4)
GFR SERPL CREATININE-BSD FRML MDRD: 44 ML/MIN/1.73M2
GLUCOSE SERPL-MCNC: 262 MG/DL (ref 70–99)
HCT VFR BLD AUTO: 46.8 % (ref 40.7–50.3)
HGB BLD-MCNC: 14 G/DL (ref 13–17)
MCH RBC QN AUTO: 27.3 PG (ref 25.2–33.5)
MCHC RBC AUTO-ENTMCNC: 29.9 G/DL (ref 28.4–34.8)
MCV RBC AUTO: 91.4 FL (ref 82.6–102.9)
NRBC BLD-RTO: 0 PER 100 WBC
PHOSPHATE SERPL-MCNC: 3.3 MG/DL (ref 2.5–4.5)
PLATELET # BLD AUTO: 150 K/UL (ref 138–453)
PMV BLD AUTO: 10.1 FL (ref 8.1–13.5)
POTASSIUM SERPL-SCNC: 4.3 MMOL/L (ref 3.7–5.3)
PTH-INTACT SERPL-MCNC: 76 PG/ML (ref 15–65)
RBC # BLD AUTO: 5.12 M/UL (ref 4.21–5.77)
SODIUM SERPL-SCNC: 142 MMOL/L (ref 135–144)
TOTAL PROTEIN, URINE: 29 MG/DL
URINE TOTAL PROTEIN CREATININE RATIO: 0.52 (ref 0–0.2)
WBC OTHER # BLD: 5.2 K/UL (ref 3.5–11.3)

## 2023-10-05 PROCEDURE — 84156 ASSAY OF PROTEIN URINE: CPT

## 2023-10-05 PROCEDURE — 83970 ASSAY OF PARATHORMONE: CPT

## 2023-10-05 PROCEDURE — 85027 COMPLETE CBC AUTOMATED: CPT

## 2023-10-05 PROCEDURE — 82570 ASSAY OF URINE CREATININE: CPT

## 2023-10-05 PROCEDURE — 82040 ASSAY OF SERUM ALBUMIN: CPT

## 2023-10-05 PROCEDURE — 36415 COLL VENOUS BLD VENIPUNCTURE: CPT

## 2023-10-05 PROCEDURE — 84100 ASSAY OF PHOSPHORUS: CPT

## 2023-10-05 PROCEDURE — 80048 BASIC METABOLIC PNL TOTAL CA: CPT

## 2023-12-26 DIAGNOSIS — I25.119 CORONARY ARTERY DISEASE INVOLVING NATIVE CORONARY ARTERY OF NATIVE HEART WITH ANGINA PECTORIS (HCC): Chronic | ICD-10-CM

## 2023-12-26 DIAGNOSIS — I48.21 PERMANENT ATRIAL FIBRILLATION (HCC): Chronic | ICD-10-CM

## 2023-12-26 DIAGNOSIS — Z95.0 PACEMAKER: Chronic | ICD-10-CM

## 2023-12-26 DIAGNOSIS — E78.2 MIXED HYPERLIPIDEMIA: ICD-10-CM

## 2023-12-26 DIAGNOSIS — I49.5 TACHYCARDIA-BRADYCARDIA SYNDROME (HCC): ICD-10-CM

## 2023-12-26 DIAGNOSIS — I50.32 CHRONIC DIASTOLIC CHF (CONGESTIVE HEART FAILURE) (HCC): Chronic | ICD-10-CM

## 2023-12-26 RX ORDER — APIXABAN 2.5 MG/1
TABLET, FILM COATED ORAL
Qty: 180 TABLET | Refills: 3 | Status: SHIPPED | OUTPATIENT
Start: 2023-12-26

## 2024-01-03 ENCOUNTER — HOSPITAL ENCOUNTER (OUTPATIENT)
Age: 89
Discharge: HOME | End: 2024-01-03
Payer: MEDICARE

## 2024-01-03 DIAGNOSIS — E11.22 TYPE 2 DIABETES MELLITUS WITH STAGE 3B CHRONIC KIDNEY DISEASE, WITHOUT LONG-TERM CURRENT USE OF INSULIN (HCC): ICD-10-CM

## 2024-01-03 DIAGNOSIS — N18.32 TYPE 2 DIABETES MELLITUS WITH STAGE 3B CHRONIC KIDNEY DISEASE, WITHOUT LONG-TERM CURRENT USE OF INSULIN (HCC): ICD-10-CM

## 2024-01-03 DIAGNOSIS — I10 ESSENTIAL HYPERTENSION: ICD-10-CM

## 2024-01-03 DIAGNOSIS — M35.3 POLYMYALGIA RHEUMATICA (HCC): ICD-10-CM

## 2024-01-03 DIAGNOSIS — N18.32 STAGE 3B CHRONIC KIDNEY DISEASE (HCC): ICD-10-CM

## 2024-01-03 LAB
ALBUMIN SERPL-MCNC: 4.3 G/DL (ref 3.5–5.2)
ANION GAP SERPL CALCULATED.3IONS-SCNC: 7 MMOL/L (ref 9–17)
BUN SERPL-MCNC: 33 MG/DL (ref 8–23)
BUN/CREAT SERPL: 22 (ref 9–20)
CALCIUM SERPL-MCNC: 9.4 MG/DL (ref 8.6–10.4)
CHLORIDE SERPL-SCNC: 104 MMOL/L (ref 98–107)
CO2 SERPL-SCNC: 30 MMOL/L (ref 20–31)
CREAT SERPL-MCNC: 1.5 MG/DL (ref 0.7–1.2)
CREAT UR-MCNC: 117.5 MG/DL (ref 39–259)
ERYTHROCYTE [DISTWIDTH] IN BLOOD BY AUTOMATED COUNT: 13.6 % (ref 11.8–14.4)
GFR SERPL CREATININE-BSD FRML MDRD: 43 ML/MIN/1.73M2
GLUCOSE SERPL-MCNC: 107 MG/DL (ref 70–99)
HCT VFR BLD AUTO: 47.6 % (ref 40.7–50.3)
HGB BLD-MCNC: 15.3 G/DL (ref 13–17)
MCH RBC QN AUTO: 27.9 PG (ref 25.2–33.5)
MCHC RBC AUTO-ENTMCNC: 32.1 G/DL (ref 28.4–34.8)
MCV RBC AUTO: 86.9 FL (ref 82.6–102.9)
NRBC BLD-RTO: 0 PER 100 WBC
PHOSPHATE SERPL-MCNC: 3.4 MG/DL (ref 2.5–4.5)
PLATELET # BLD AUTO: 134 K/UL (ref 138–453)
PMV BLD AUTO: 9.3 FL (ref 8.1–13.5)
POTASSIUM SERPL-SCNC: 4.4 MMOL/L (ref 3.7–5.3)
RBC # BLD AUTO: 5.48 M/UL (ref 4.21–5.77)
SODIUM SERPL-SCNC: 141 MMOL/L (ref 135–144)
TOTAL PROTEIN, URINE: 52 MG/DL
URINE TOTAL PROTEIN CREATININE RATIO: 0.44 (ref 0–0.2)
WBC OTHER # BLD: 5.7 K/UL (ref 3.5–11.3)

## 2024-01-03 PROCEDURE — 82040 ASSAY OF SERUM ALBUMIN: CPT

## 2024-01-03 PROCEDURE — 85027 COMPLETE CBC AUTOMATED: CPT

## 2024-01-03 PROCEDURE — 84100 ASSAY OF PHOSPHORUS: CPT

## 2024-01-03 PROCEDURE — 36415 COLL VENOUS BLD VENIPUNCTURE: CPT

## 2024-01-03 PROCEDURE — 80048 BASIC METABOLIC PNL TOTAL CA: CPT

## 2024-01-03 PROCEDURE — 82570 ASSAY OF URINE CREATININE: CPT

## 2024-01-03 PROCEDURE — 83970 ASSAY OF PARATHORMONE: CPT

## 2024-01-03 PROCEDURE — 84156 ASSAY OF PROTEIN URINE: CPT

## 2024-01-04 LAB — PTH-INTACT SERPL-MCNC: 69 PG/ML (ref 15–65)

## 2024-02-14 ENCOUNTER — OFFICE VISIT (OUTPATIENT)
Dept: CARDIOLOGY | Age: 89
End: 2024-02-14
Payer: MEDICARE

## 2024-02-14 VITALS
HEART RATE: 90 BPM | HEIGHT: 67 IN | DIASTOLIC BLOOD PRESSURE: 78 MMHG | WEIGHT: 183 LBS | BODY MASS INDEX: 28.72 KG/M2 | SYSTOLIC BLOOD PRESSURE: 122 MMHG | RESPIRATION RATE: 16 BRPM

## 2024-02-14 DIAGNOSIS — I10 ESSENTIAL HYPERTENSION: ICD-10-CM

## 2024-02-14 DIAGNOSIS — Z79.01 CHRONIC ANTICOAGULATION: ICD-10-CM

## 2024-02-14 DIAGNOSIS — I48.21 PERMANENT ATRIAL FIBRILLATION (HCC): Primary | ICD-10-CM

## 2024-02-14 DIAGNOSIS — E78.2 MIXED HYPERLIPIDEMIA: ICD-10-CM

## 2024-02-14 DIAGNOSIS — I49.5 TACHYCARDIA-BRADYCARDIA SYNDROME (HCC): ICD-10-CM

## 2024-02-14 DIAGNOSIS — I25.810 CORONARY ARTERY DISEASE INVOLVING CORONARY BYPASS GRAFT OF NATIVE HEART WITHOUT ANGINA PECTORIS: ICD-10-CM

## 2024-02-14 DIAGNOSIS — Z95.0 PACEMAKER: ICD-10-CM

## 2024-02-14 DIAGNOSIS — Z95.1 S/P CABG (CORONARY ARTERY BYPASS GRAFT): ICD-10-CM

## 2024-02-14 DIAGNOSIS — I50.32 CHRONIC DIASTOLIC CONGESTIVE HEART FAILURE (HCC): ICD-10-CM

## 2024-02-14 PROCEDURE — 99211 OFF/OP EST MAY X REQ PHY/QHP: CPT | Performed by: INTERNAL MEDICINE

## 2024-02-14 PROCEDURE — 93288 INTERROG EVL PM/LDLS PM IP: CPT | Performed by: INTERNAL MEDICINE

## 2024-02-14 NOTE — PROGRESS NOTES
History:  Past Surgical History:   Procedure Laterality Date    ABDOMINAL AORTIC ANEURYSM REPAIR      [facility]    APPENDECTOMY      BLADDER SURGERY      CATARACT REMOVAL Bilateral     CHOLECYSTECTOMY      CORONARY ARTERY BYPASS GRAFT  1995    x3 [facility]    KIDNEY REMOVAL Left 2015    [facility]    PACEMAKER INSERTION      PROSTATECTOMY      TONSILLECTOMY AND ADENOIDECTOMY       Social History:    Social History     Tobacco Use   Smoking Status Former    Current packs/day: 0.00    Average packs/day: 1 pack/day for 50.0 years (50.0 ttl pk-yrs)    Types: Cigarettes, Pipe    Start date:     Quit date:     Years since quittin.1   Smokeless Tobacco Never     Family history reviewed, noncontributory.  Patient with known history of CAD, CHF, A. fib and pacemaker.    Current Medications:  Outpatient Medications Marked as Taking for the 24 encounter (Office Visit) with Jason Haskins MD   Medication Sig Dispense Refill    torsemide (DEMADEX) 20 MG tablet Take 20 mg on mon wed and fri Take one half tablet on sun, tu,thurs and sat 90 tablet 3    ELIQUIS 2.5 MG TABS tablet TAKE ONE TABLET BY MOUTH TWICE A  tablet 3    spironolactone (ALDACTONE) 25 MG tablet Take 0.5 tablets by mouth daily 45 tablet 3    metoprolol succinate (TOPROL XL) 100 MG extended release tablet TAKE ONE TABLET BY MOUTH TWICE A  tablet 3    umeclidinium-vilanterol (ANORO ELLIPTA) 62.5-25 MCG/ACT inhaler Inhale 1 puff into the lungs daily 3 each 3    NONFORMULARY 1 capsule daily Pt takes Balance of Nutrition daily as 3 of Vegetables Capsules and 3 of the fruit capsules.      glimepiride (AMARYL) 2 MG tablet Take 1 tablet by mouth 2 times daily      Coenzyme Q10 (CO Q-10) 400 MG CAPS Take 100 mg by mouth 2 times daily       simvastatin (ZOCOR) 20 MG tablet Take 1 tablet by mouth nightly      Multiple Vitamins-Minerals (CENTRUM SILVER) TABS Take 1 tablet by mouth daily         Review of Systems

## 2024-02-21 ENCOUNTER — OFFICE VISIT (OUTPATIENT)
Dept: PULMONOLOGY | Age: 89
End: 2024-02-21
Payer: MEDICARE

## 2024-02-21 VITALS
BODY MASS INDEX: 29 KG/M2 | SYSTOLIC BLOOD PRESSURE: 145 MMHG | HEIGHT: 67 IN | OXYGEN SATURATION: 98 % | WEIGHT: 184.8 LBS | TEMPERATURE: 97.7 F | DIASTOLIC BLOOD PRESSURE: 86 MMHG | HEART RATE: 80 BPM | RESPIRATION RATE: 18 BRPM

## 2024-02-21 DIAGNOSIS — R06.09 DYSPNEA ON EXERTION: ICD-10-CM

## 2024-02-21 DIAGNOSIS — M89.9 FRONTAL SKULL LESION: Primary | ICD-10-CM

## 2024-02-21 DIAGNOSIS — J44.9 STAGE 1 MILD COPD BY GOLD CLASSIFICATION (HCC): ICD-10-CM

## 2024-02-21 PROCEDURE — 3023F SPIROM DOC REV: CPT | Performed by: NURSE PRACTITIONER

## 2024-02-21 PROCEDURE — 1036F TOBACCO NON-USER: CPT | Performed by: NURSE PRACTITIONER

## 2024-02-21 PROCEDURE — G8417 CALC BMI ABV UP PARAM F/U: HCPCS | Performed by: NURSE PRACTITIONER

## 2024-02-21 PROCEDURE — 99214 OFFICE O/P EST MOD 30 MIN: CPT | Performed by: NURSE PRACTITIONER

## 2024-02-21 PROCEDURE — G8484 FLU IMMUNIZE NO ADMIN: HCPCS | Performed by: NURSE PRACTITIONER

## 2024-02-21 PROCEDURE — 1123F ACP DISCUSS/DSCN MKR DOCD: CPT | Performed by: NURSE PRACTITIONER

## 2024-02-21 PROCEDURE — G8427 DOCREV CUR MEDS BY ELIG CLIN: HCPCS | Performed by: NURSE PRACTITIONER

## 2024-02-21 PROCEDURE — 99213 OFFICE O/P EST LOW 20 MIN: CPT | Performed by: NURSE PRACTITIONER

## 2024-02-21 RX ORDER — UMECLIDINIUM BROMIDE AND VILANTEROL TRIFENATATE 62.5; 25 UG/1; UG/1
1 POWDER RESPIRATORY (INHALATION) DAILY
Qty: 3 EACH | Refills: 3 | Status: SHIPPED | OUTPATIENT
Start: 2024-02-21

## 2024-02-21 ASSESSMENT — ENCOUNTER SYMPTOMS
GASTROINTESTINAL NEGATIVE: 1
ALLERGIC/IMMUNOLOGIC NEGATIVE: 1
EYES NEGATIVE: 1

## 2024-02-21 NOTE — PATIENT INSTRUCTIONS
SURVEY:    Thank you for allowing us to care for you today.    You may be receiving a survey from [facility] regarding your visit today- electronically or via mail.      Please help us by completing the survey as this will provide the needed feedback to ensure we are providing the very best care for you and your family.    If you cannot score us a very good on any question, please call the office to discuss how we could have made your experience a very good one.    Thank you.       STAFF:    Luz Vásquez, Ynes Donnelly, Mirta Grimm      CLINICAL STAFF:    Lynn Kraft LPN, Taylor Hyde LPN, Nya Umana LPN, Keturah Manzano CMA

## 2024-03-18 RX ORDER — METOPROLOL SUCCINATE 100 MG/1
100 TABLET, EXTENDED RELEASE ORAL 2 TIMES DAILY
Qty: 180 TABLET | Refills: 3 | Status: SHIPPED | OUTPATIENT
Start: 2024-03-18

## 2024-03-29 ENCOUNTER — HOSPITAL ENCOUNTER (OUTPATIENT)
Age: 89
Discharge: HOME | End: 2024-03-29
Payer: MEDICARE

## 2024-03-29 DIAGNOSIS — E11.22 TYPE 2 DIABETES MELLITUS WITH STAGE 3B CHRONIC KIDNEY DISEASE, WITHOUT LONG-TERM CURRENT USE OF INSULIN (HCC): ICD-10-CM

## 2024-03-29 DIAGNOSIS — N18.32 STAGE 3B CHRONIC KIDNEY DISEASE (HCC): ICD-10-CM

## 2024-03-29 DIAGNOSIS — I10 ESSENTIAL HYPERTENSION: ICD-10-CM

## 2024-03-29 DIAGNOSIS — M35.3 POLYMYALGIA RHEUMATICA (HCC): ICD-10-CM

## 2024-03-29 DIAGNOSIS — N18.32 TYPE 2 DIABETES MELLITUS WITH STAGE 3B CHRONIC KIDNEY DISEASE, WITHOUT LONG-TERM CURRENT USE OF INSULIN (HCC): ICD-10-CM

## 2024-03-29 LAB
ALBUMIN SERPL-MCNC: 4.1 G/DL (ref 3.5–5.2)
ANION GAP SERPL CALCULATED.3IONS-SCNC: 11 MMOL/L (ref 9–17)
BUN SERPL-MCNC: 22 MG/DL (ref 8–23)
BUN/CREAT SERPL: 14 (ref 9–20)
CALCIUM SERPL-MCNC: 9.3 MG/DL (ref 8.6–10.4)
CHLORIDE SERPL-SCNC: 103 MMOL/L (ref 98–107)
CO2 SERPL-SCNC: 26 MMOL/L (ref 20–31)
CREAT SERPL-MCNC: 1.6 MG/DL (ref 0.7–1.2)
CREAT UR-MCNC: 34.2 MG/DL (ref 39–259)
ERYTHROCYTE [DISTWIDTH] IN BLOOD BY AUTOMATED COUNT: 13.2 % (ref 11.8–14.4)
GFR SERPL CREATININE-BSD FRML MDRD: 40 ML/MIN/1.73M2
GLUCOSE SERPL-MCNC: 92 MG/DL (ref 70–99)
HCT VFR BLD AUTO: 44 % (ref 40.7–50.3)
HGB BLD-MCNC: 14.4 G/DL (ref 13–17)
MCH RBC QN AUTO: 28.4 PG (ref 25.2–33.5)
MCHC RBC AUTO-ENTMCNC: 32.7 G/DL (ref 28.4–34.8)
MCV RBC AUTO: 86.8 FL (ref 82.6–102.9)
NRBC BLD-RTO: 0 PER 100 WBC
PHOSPHATE SERPL-MCNC: 3.3 MG/DL (ref 2.5–4.5)
PLATELET # BLD AUTO: 150 K/UL (ref 138–453)
PMV BLD AUTO: 9.4 FL (ref 8.1–13.5)
POTASSIUM SERPL-SCNC: 4 MMOL/L (ref 3.7–5.3)
PTH-INTACT SERPL-MCNC: 76 PG/ML (ref 15–65)
RBC # BLD AUTO: 5.07 M/UL (ref 4.21–5.77)
SODIUM SERPL-SCNC: 140 MMOL/L (ref 135–144)
TOTAL PROTEIN, URINE: 23 MG/DL
URINE TOTAL PROTEIN CREATININE RATIO: 0.67 (ref 0–0.2)
WBC OTHER # BLD: 6.8 K/UL (ref 3.5–11.3)

## 2024-03-29 PROCEDURE — 82040 ASSAY OF SERUM ALBUMIN: CPT

## 2024-03-29 PROCEDURE — 83970 ASSAY OF PARATHORMONE: CPT

## 2024-03-29 PROCEDURE — 80048 BASIC METABOLIC PNL TOTAL CA: CPT

## 2024-03-29 PROCEDURE — 84156 ASSAY OF PROTEIN URINE: CPT

## 2024-03-29 PROCEDURE — 36415 COLL VENOUS BLD VENIPUNCTURE: CPT

## 2024-03-29 PROCEDURE — 82570 ASSAY OF URINE CREATININE: CPT

## 2024-03-29 PROCEDURE — 84100 ASSAY OF PHOSPHORUS: CPT

## 2024-03-29 PROCEDURE — 85027 COMPLETE CBC AUTOMATED: CPT

## 2024-05-22 ENCOUNTER — NURSE ONLY (OUTPATIENT)
Dept: CARDIOLOGY | Age: 89
End: 2024-05-22

## 2024-05-22 DIAGNOSIS — I49.5 TACHYCARDIA-BRADYCARDIA SYNDROME (HCC): Primary | ICD-10-CM

## 2024-05-22 DIAGNOSIS — I49.5 TACHY-BRADY SYNDROME (HCC): ICD-10-CM

## 2024-05-22 DIAGNOSIS — Z95.0 PACEMAKER: ICD-10-CM

## 2024-07-05 ENCOUNTER — HOSPITAL ENCOUNTER (OUTPATIENT)
Age: 89
Discharge: HOME | End: 2024-07-05
Payer: MEDICARE

## 2024-07-05 DIAGNOSIS — N18.32 TYPE 2 DIABETES MELLITUS WITH STAGE 3B CHRONIC KIDNEY DISEASE, WITHOUT LONG-TERM CURRENT USE OF INSULIN (HCC): ICD-10-CM

## 2024-07-05 DIAGNOSIS — E11.22 TYPE 2 DIABETES MELLITUS WITH STAGE 3B CHRONIC KIDNEY DISEASE, WITHOUT LONG-TERM CURRENT USE OF INSULIN (HCC): ICD-10-CM

## 2024-07-05 DIAGNOSIS — N18.32 STAGE 3B CHRONIC KIDNEY DISEASE (HCC): ICD-10-CM

## 2024-07-05 DIAGNOSIS — I10 ESSENTIAL HYPERTENSION: ICD-10-CM

## 2024-07-05 DIAGNOSIS — M35.3 POLYMYALGIA RHEUMATICA (HCC): ICD-10-CM

## 2024-07-05 LAB
ALBUMIN SERPL-MCNC: 4.2 G/DL (ref 3.5–5.2)
ANION GAP SERPL CALCULATED.3IONS-SCNC: 9 MMOL/L (ref 9–17)
BUN SERPL-MCNC: 26 MG/DL (ref 8–23)
BUN/CREAT SERPL: 17 (ref 9–20)
CALCIUM SERPL-MCNC: 9.2 MG/DL (ref 8.6–10.4)
CHLORIDE SERPL-SCNC: 100 MMOL/L (ref 98–107)
CO2 SERPL-SCNC: 30 MMOL/L (ref 20–31)
CREAT SERPL-MCNC: 1.5 MG/DL (ref 0.7–1.2)
CREAT UR-MCNC: 26.8 MG/DL (ref 39–259)
ERYTHROCYTE [DISTWIDTH] IN BLOOD BY AUTOMATED COUNT: 14.5 % (ref 11.8–14.4)
GFR, ESTIMATED: 43 ML/MIN/1.73M2
GLUCOSE SERPL-MCNC: 181 MG/DL (ref 70–99)
HCT VFR BLD AUTO: 48.4 % (ref 40.7–50.3)
HGB BLD-MCNC: 15.5 G/DL (ref 13–17)
MCH RBC QN AUTO: 28.5 PG (ref 25.2–33.5)
MCHC RBC AUTO-ENTMCNC: 32 G/DL (ref 28.4–34.8)
MCV RBC AUTO: 89.1 FL (ref 82.6–102.9)
NRBC BLD-RTO: 0 PER 100 WBC
PHOSPHATE SERPL-MCNC: 2.9 MG/DL (ref 2.5–4.5)
PLATELET # BLD AUTO: ABNORMAL K/UL (ref 138–453)
PLATELET, FLUORESCENCE: 133 K/UL (ref 138–453)
PLATELETS.RETICULATED NFR BLD AUTO: 2.8 % (ref 1.1–10.3)
POTASSIUM SERPL-SCNC: 4.1 MMOL/L (ref 3.7–5.3)
PTH-INTACT SERPL-MCNC: 65 PG/ML (ref 15–65)
RBC # BLD AUTO: 5.43 M/UL (ref 4.21–5.77)
SODIUM SERPL-SCNC: 139 MMOL/L (ref 135–144)
TOTAL PROTEIN, URINE: 17 MG/DL
URINE TOTAL PROTEIN CREATININE RATIO: 0.63 (ref 0–0.2)
WBC OTHER # BLD: 7.4 K/UL (ref 3.5–11.3)

## 2024-07-05 PROCEDURE — 84156 ASSAY OF PROTEIN URINE: CPT

## 2024-07-05 PROCEDURE — 82040 ASSAY OF SERUM ALBUMIN: CPT

## 2024-07-05 PROCEDURE — 84100 ASSAY OF PHOSPHORUS: CPT

## 2024-07-05 PROCEDURE — 85027 COMPLETE CBC AUTOMATED: CPT

## 2024-07-05 PROCEDURE — 82570 ASSAY OF URINE CREATININE: CPT

## 2024-07-05 PROCEDURE — 83970 ASSAY OF PARATHORMONE: CPT

## 2024-07-05 PROCEDURE — 80048 BASIC METABOLIC PNL TOTAL CA: CPT

## 2024-07-05 PROCEDURE — 36415 COLL VENOUS BLD VENIPUNCTURE: CPT

## 2024-08-20 ENCOUNTER — OFFICE VISIT (OUTPATIENT)
Dept: CARDIOLOGY | Age: 89
End: 2024-08-20
Payer: MEDICARE

## 2024-08-20 VITALS
DIASTOLIC BLOOD PRESSURE: 90 MMHG | RESPIRATION RATE: 18 BRPM | HEART RATE: 88 BPM | BODY MASS INDEX: 28.63 KG/M2 | HEIGHT: 67 IN | OXYGEN SATURATION: 99 % | SYSTOLIC BLOOD PRESSURE: 154 MMHG | WEIGHT: 182.4 LBS

## 2024-08-20 DIAGNOSIS — H35.09: Chronic | ICD-10-CM

## 2024-08-20 DIAGNOSIS — I50.32 CHRONIC DIASTOLIC CHF (CONGESTIVE HEART FAILURE) (HCC): Chronic | ICD-10-CM

## 2024-08-20 DIAGNOSIS — I70.8: Chronic | ICD-10-CM

## 2024-08-20 DIAGNOSIS — E78.2 MIXED HYPERLIPIDEMIA: ICD-10-CM

## 2024-08-20 DIAGNOSIS — I48.20 CHRONIC ATRIAL FIBRILLATION (HCC): Primary | Chronic | ICD-10-CM

## 2024-08-20 DIAGNOSIS — Z95.0 PACEMAKER: ICD-10-CM

## 2024-08-20 DIAGNOSIS — I10 ESSENTIAL HYPERTENSION: Chronic | ICD-10-CM

## 2024-08-20 PROCEDURE — 99211 OFF/OP EST MAY X REQ PHY/QHP: CPT | Performed by: INTERNAL MEDICINE

## 2024-08-20 PROCEDURE — G8417 CALC BMI ABV UP PARAM F/U: HCPCS | Performed by: INTERNAL MEDICINE

## 2024-08-20 PROCEDURE — G8427 DOCREV CUR MEDS BY ELIG CLIN: HCPCS | Performed by: INTERNAL MEDICINE

## 2024-08-20 PROCEDURE — 93005 ELECTROCARDIOGRAM TRACING: CPT | Performed by: INTERNAL MEDICINE

## 2024-08-20 PROCEDURE — 1123F ACP DISCUSS/DSCN MKR DOCD: CPT | Performed by: INTERNAL MEDICINE

## 2024-08-20 PROCEDURE — 93010 ELECTROCARDIOGRAM REPORT: CPT | Performed by: INTERNAL MEDICINE

## 2024-08-20 PROCEDURE — 1036F TOBACCO NON-USER: CPT | Performed by: INTERNAL MEDICINE

## 2024-08-20 PROCEDURE — 99214 OFFICE O/P EST MOD 30 MIN: CPT | Performed by: INTERNAL MEDICINE

## 2024-08-20 RX ORDER — PREDNISONE 1 MG/1
1 TABLET ORAL DAILY
COMMUNITY

## 2024-08-20 RX ORDER — ORAL SEMAGLUTIDE 3 MG/1
TABLET ORAL
COMMUNITY
Start: 2024-07-19

## 2024-08-20 RX ORDER — ASPIRIN 81 MG/1
81 TABLET ORAL DAILY
Qty: 90 TABLET | Refills: 3 | Status: SHIPPED | OUTPATIENT
Start: 2024-08-20

## 2024-08-20 ASSESSMENT — ENCOUNTER SYMPTOMS
HEMOPTYSIS: 0
VOMITING: 0
BACK PAIN: 1
EYE PAIN: 0
PHOTOPHOBIA: 0
SPUTUM PRODUCTION: 0
EYE DISCHARGE: 0
SHORTNESS OF BREATH: 1
COUGH: 0
HEARTBURN: 0
DOUBLE VISION: 0
NAUSEA: 0
ORTHOPNEA: 0
CONSTIPATION: 0
ABDOMINAL PAIN: 0
DIARRHEA: 0
BLURRED VISION: 0

## 2024-08-20 NOTE — PROGRESS NOTES
problem list and past encounter diagnosis.  Stroke Risk: His CHADS2-VASc score is 5/9 (6.7% stroke risk)  Anticoagulation: Continue Apixaban (Eliquis) 2.5 mg every 12 hours.  Dose appropriate, age 89 and creatinine >1.5 mg/dL. Because of his atrial fibrillation, I also would also recommend that he continue with anticoagulation to decrease his risk of stoke but also reminded him to watch for signs of blood in his stool or black tarry stools and stop the medication immediately if this develops as this could be life threatening.     Atherosclerotic Heart Disease: History of CABG in 1995   Antiplatelet Agent: Start aspirin 81 mg daily.  Diuretics: Continue torsemide (Demodex) as directed by nephrology  Diuretics: Continue Spironolactone (Aldactone) 25 mg, 1/2 tab daily.    Beta Blocker: Continue metoprolol succinate (Toprol XL) 100 mg 2 times daily  Cholesterol Reduction Therapy: Continue simvastatin (Zocor) 20 mg daily.    I reviewed his most recent blood work, kidney function stable, he has stage IIIa chronic kidney disease.  He has single kidney.  He is following up with nephrology.  Fairly active physically for his age, he walks about 1/2 mile a day without significant shortness of breath chest pain.    Chronic Diastolic Heart Failure: EF 55% on 4/2018  Beta Blocker: Continue metoprolol succinate (Toprol XL) 100 mg 2 times daily   Diuretics: Continue torsemide (Demodex) as directed by nephrology     Diuretics: Continue Spironolactone (Aldactone) 25 mg, 1/2 tab daily.       Essential Hypertension:   Beta Blocker: Continue metoprolol succinate (Toprol XL) 100 mg 2 times daily   Diuretics: Continue torsemide (Demodex) as directed by nephrology     Diuretics: Continue Spironolactone (Aldactone) 25 mg, 1/2 tab daily.    I asked him to monitor his blood pressure at home and keep a log of his blood pressure and call me back in 1 to 2 weeks.    Hyperlipidemia: Mixed, LDL done on 8/15/2023 was 48 mg/ dL  Cholesterol

## 2024-10-01 ENCOUNTER — HOSPITAL ENCOUNTER (OUTPATIENT)
Age: 89
Discharge: HOME | End: 2024-10-01
Payer: MEDICARE

## 2024-10-01 DIAGNOSIS — I10 ESSENTIAL HYPERTENSION: ICD-10-CM

## 2024-10-01 DIAGNOSIS — M35.3 POLYMYALGIA RHEUMATICA (HCC): ICD-10-CM

## 2024-10-01 DIAGNOSIS — E11.22 TYPE 2 DIABETES MELLITUS WITH STAGE 3B CHRONIC KIDNEY DISEASE, WITHOUT LONG-TERM CURRENT USE OF INSULIN (HCC): ICD-10-CM

## 2024-10-01 DIAGNOSIS — N18.32 TYPE 2 DIABETES MELLITUS WITH STAGE 3B CHRONIC KIDNEY DISEASE, WITHOUT LONG-TERM CURRENT USE OF INSULIN (HCC): ICD-10-CM

## 2024-10-01 DIAGNOSIS — N18.32 STAGE 3B CHRONIC KIDNEY DISEASE (HCC): ICD-10-CM

## 2024-10-01 LAB
ALBUMIN SERPL-MCNC: 3.8 G/DL (ref 3.5–5.2)
ANION GAP SERPL CALCULATED.3IONS-SCNC: 9 MMOL/L (ref 9–16)
BUN SERPL-MCNC: 28 MG/DL (ref 8–23)
BUN/CREAT SERPL: 19 (ref 9–20)
CALCIUM SERPL-MCNC: 9.2 MG/DL (ref 8.6–10.4)
CHLORIDE SERPL-SCNC: 98 MMOL/L (ref 98–107)
CO2 SERPL-SCNC: 29 MMOL/L (ref 20–31)
CREAT SERPL-MCNC: 1.5 MG/DL (ref 0.7–1.2)
CREAT UR-MCNC: 46.3 MG/DL (ref 39–259)
ERYTHROCYTE [DISTWIDTH] IN BLOOD BY AUTOMATED COUNT: 12.8 % (ref 11.8–14.4)
GFR, ESTIMATED: 42 ML/MIN/1.73M2
GLUCOSE SERPL-MCNC: 319 MG/DL (ref 74–99)
HCT VFR BLD AUTO: 43.4 % (ref 40.7–50.3)
HGB BLD-MCNC: 14.7 G/DL (ref 13–17)
MCH RBC QN AUTO: 29.5 PG (ref 25.2–33.5)
MCHC RBC AUTO-ENTMCNC: 33.9 G/DL (ref 28.4–34.8)
MCV RBC AUTO: 87.1 FL (ref 82.6–102.9)
NRBC BLD-RTO: 0 PER 100 WBC
PHOSPHATE SERPL-MCNC: 2.3 MG/DL (ref 2.5–4.5)
PLATELET # BLD AUTO: 152 K/UL (ref 138–453)
PMV BLD AUTO: 9.8 FL (ref 8.1–13.5)
POTASSIUM SERPL-SCNC: 4.6 MMOL/L (ref 3.7–5.3)
RBC # BLD AUTO: 4.98 M/UL (ref 4.21–5.77)
SODIUM SERPL-SCNC: 136 MMOL/L (ref 136–145)
TOTAL PROTEIN, URINE: 12 MG/DL
URINE TOTAL PROTEIN CREATININE RATIO: 0.26 (ref 0–0.2)
WBC OTHER # BLD: 6.1 K/UL (ref 3.5–11.3)

## 2024-10-01 PROCEDURE — 36415 COLL VENOUS BLD VENIPUNCTURE: CPT

## 2024-10-01 PROCEDURE — 83970 ASSAY OF PARATHORMONE: CPT

## 2024-10-01 PROCEDURE — 84100 ASSAY OF PHOSPHORUS: CPT

## 2024-10-01 PROCEDURE — 82040 ASSAY OF SERUM ALBUMIN: CPT

## 2024-10-01 PROCEDURE — 80048 BASIC METABOLIC PNL TOTAL CA: CPT

## 2024-10-01 PROCEDURE — 85027 COMPLETE CBC AUTOMATED: CPT

## 2024-10-01 PROCEDURE — 84156 ASSAY OF PROTEIN URINE: CPT

## 2024-10-01 PROCEDURE — 82570 ASSAY OF URINE CREATININE: CPT

## 2024-10-02 LAB — PTH-INTACT SERPL-MCNC: 63 PG/ML (ref 15–65)

## 2024-12-24 DIAGNOSIS — E78.2 MIXED HYPERLIPIDEMIA: ICD-10-CM

## 2024-12-24 DIAGNOSIS — I50.32 CHRONIC DIASTOLIC CHF (CONGESTIVE HEART FAILURE) (HCC): Chronic | ICD-10-CM

## 2024-12-24 DIAGNOSIS — I25.119 CORONARY ARTERY DISEASE INVOLVING NATIVE CORONARY ARTERY OF NATIVE HEART WITH ANGINA PECTORIS (HCC): Chronic | ICD-10-CM

## 2024-12-24 DIAGNOSIS — Z95.0 PACEMAKER: Chronic | ICD-10-CM

## 2024-12-24 DIAGNOSIS — I48.21 PERMANENT ATRIAL FIBRILLATION (HCC): Chronic | ICD-10-CM

## 2024-12-24 DIAGNOSIS — I49.5 TACHYCARDIA-BRADYCARDIA SYNDROME (HCC): ICD-10-CM

## 2025-02-12 ENCOUNTER — OFFICE VISIT (OUTPATIENT)
Dept: CARDIOLOGY | Age: 89
End: 2025-02-12

## 2025-02-12 VITALS
SYSTOLIC BLOOD PRESSURE: 124 MMHG | RESPIRATION RATE: 18 BRPM | HEIGHT: 67 IN | HEART RATE: 69 BPM | BODY MASS INDEX: 29.07 KG/M2 | WEIGHT: 185.2 LBS | DIASTOLIC BLOOD PRESSURE: 61 MMHG

## 2025-02-12 DIAGNOSIS — I49.5 TACHYCARDIA-BRADYCARDIA SYNDROME (HCC): ICD-10-CM

## 2025-02-12 DIAGNOSIS — I50.32 CHRONIC DIASTOLIC CHF (CONGESTIVE HEART FAILURE) (HCC): Primary | ICD-10-CM

## 2025-02-12 DIAGNOSIS — I48.20 CHRONIC ATRIAL FIBRILLATION (HCC): ICD-10-CM

## 2025-02-12 DIAGNOSIS — I73.9 PVD (PERIPHERAL VASCULAR DISEASE) (HCC): ICD-10-CM

## 2025-02-12 DIAGNOSIS — E78.2 MIXED HYPERLIPIDEMIA: ICD-10-CM

## 2025-02-12 DIAGNOSIS — I34.0 NONRHEUMATIC MITRAL VALVE REGURGITATION: ICD-10-CM

## 2025-02-12 DIAGNOSIS — Z95.0 PACEMAKER: ICD-10-CM

## 2025-02-12 DIAGNOSIS — I10 ESSENTIAL HYPERTENSION: ICD-10-CM

## 2025-02-12 DIAGNOSIS — Z95.1 S/P CABG (CORONARY ARTERY BYPASS GRAFT): ICD-10-CM

## 2025-02-12 RX ORDER — ASPIRIN 81 MG/1
81 TABLET ORAL DAILY
Qty: 90 TABLET | Refills: 0
Start: 2025-02-12

## 2025-02-12 ASSESSMENT — ENCOUNTER SYMPTOMS
SHORTNESS OF BREATH: 1
PHOTOPHOBIA: 0
VOMITING: 0
BACK PAIN: 1
ORTHOPNEA: 0
BLURRED VISION: 0
DIARRHEA: 0
HEMOPTYSIS: 0
ABDOMINAL PAIN: 0
SPUTUM PRODUCTION: 0
CONSTIPATION: 0
DOUBLE VISION: 0
EYE DISCHARGE: 0
NAUSEA: 0
EYE PAIN: 0
HEARTBURN: 0
COUGH: 0

## 2025-02-12 NOTE — PROGRESS NOTES
I, Jeana Bravo am scribing for and in the presence of Jason Haskins MD, F.A.C.C..    Subjective:     CHIEF COMPLAINT / HPI:   Chief Complaint   Patient presents with    Follow-up     H:Afib. Pt is here for 1 year follow up. He states he is doing well. Denies cp, palps, sob, dizziness.      Dear Dr. Gardner, Omar CH MD,    I had the pleasure of seeing Mr. Rangel for follow-up today.    1-Danny Rangel is 89 y.o. male with extensive cardiac history that include history of myocardial infarction status post bypass surgery in 1995, long-standing history of atrial fibrillation and history of abdominal aortic aneurysm status post repair.      2-Patient has long-standing history of atrial fibrillation that started after his bypass surgery, has been tried on multiple antiarrhythmic medications including sotalol which failed to control his atrial fibrillation, dofetilide 125 mg which has been discontinued at the time of his nephrectomy.  He also has been tried on amiodarone but could not tolerate it because of side effect (lung toxicity).    3-Patient was reloaded with Tikosyn, which controlled his atrial fibrillation only partially. Patient was sent to [facility] for ablation but because of his age and his ling history of A Fib, they thought the success rate will not be that high. Decision is made to go for rate control and pacemaker placement. Patient underwent the procedure on 11/07/2016.     4-He was admitted for a low hemoglobin count (5/1/2018- 7.4) anemia secondary to acute blood loss from GI bleeding.  His Xarelto is on hold.     5-Echocardiogram: 4/19/2018: EF 55% with moderate mitral regurgitation.     6-Cardiovascular stress test: 4/25/2018: Normal myocardial perfusion.     7-An admission to [facility] on 6/17/2018 because of large left-sided pleural effusion and acute on chronic diastolic CHF. Status post thoracentesis.    8- EKG done in office on 11/23/2020.     9-Pacemaker

## 2025-03-24 DIAGNOSIS — I48.20 CHRONIC ATRIAL FIBRILLATION (HCC): Primary | ICD-10-CM

## 2025-03-25 RX ORDER — METOPROLOL SUCCINATE 100 MG/1
100 TABLET, EXTENDED RELEASE ORAL 2 TIMES DAILY
Qty: 180 TABLET | Refills: 3 | Status: SHIPPED | OUTPATIENT
Start: 2025-03-25

## 2025-05-01 DIAGNOSIS — J44.9 STAGE 1 MILD COPD BY GOLD CLASSIFICATION (HCC): ICD-10-CM

## 2025-05-01 RX ORDER — UMECLIDINIUM BROMIDE AND VILANTEROL TRIFENATATE 62.5; 25 UG/1; UG/1
POWDER RESPIRATORY (INHALATION)
OUTPATIENT
Start: 2025-05-01

## 2025-06-25 ENCOUNTER — CLINICAL SUPPORT (OUTPATIENT)
Dept: CARDIOLOGY | Age: 89
End: 2025-06-25
Payer: MEDICARE

## 2025-06-25 DIAGNOSIS — I49.5 TACHYCARDIA-BRADYCARDIA SYNDROME (HCC): ICD-10-CM

## 2025-06-25 DIAGNOSIS — Z95.0 PACEMAKER: Primary | ICD-10-CM

## 2025-06-25 PROCEDURE — 93296 REM INTERROG EVL PM/IDS: CPT | Performed by: INTERNAL MEDICINE

## 2025-06-25 PROCEDURE — 93294 REM INTERROG EVL PM/LDLS PM: CPT | Performed by: INTERNAL MEDICINE

## 2025-06-25 PROCEDURE — PBSHW PBB SHADOW CHARGE: Performed by: INTERNAL MEDICINE

## 2025-07-01 ENCOUNTER — HOSPITAL ENCOUNTER (OUTPATIENT)
Age: 89
Discharge: HOME | End: 2025-07-01
Payer: MEDICARE

## 2025-07-01 DIAGNOSIS — N18.32 TYPE 2 DIABETES MELLITUS WITH STAGE 3B CHRONIC KIDNEY DISEASE, WITHOUT LONG-TERM CURRENT USE OF INSULIN (HCC): ICD-10-CM

## 2025-07-01 DIAGNOSIS — N18.32 STAGE 3B CHRONIC KIDNEY DISEASE (HCC): ICD-10-CM

## 2025-07-01 DIAGNOSIS — N18.32 ANEMIA DUE TO STAGE 3B CHRONIC KIDNEY DISEASE (HCC): ICD-10-CM

## 2025-07-01 DIAGNOSIS — I10 ESSENTIAL HYPERTENSION: ICD-10-CM

## 2025-07-01 DIAGNOSIS — E11.22 TYPE 2 DIABETES MELLITUS WITH STAGE 3B CHRONIC KIDNEY DISEASE, WITHOUT LONG-TERM CURRENT USE OF INSULIN (HCC): ICD-10-CM

## 2025-07-01 DIAGNOSIS — D63.1 ANEMIA DUE TO STAGE 3B CHRONIC KIDNEY DISEASE (HCC): ICD-10-CM

## 2025-07-01 LAB
ALBUMIN SERPL-MCNC: 4 G/DL (ref 3.5–5.2)
ANION GAP SERPL CALCULATED.3IONS-SCNC: 12 MMOL/L (ref 9–16)
BUN SERPL-MCNC: 32 MG/DL (ref 8–23)
BUN/CREAT SERPL: 21 (ref 9–20)
CALCIUM SERPL-MCNC: 8.9 MG/DL (ref 8.6–10.4)
CHLORIDE SERPL-SCNC: 98 MMOL/L (ref 98–107)
CO2 SERPL-SCNC: 25 MMOL/L (ref 20–31)
CREAT SERPL-MCNC: 1.5 MG/DL (ref 0.7–1.2)
CREAT UR-MCNC: 32 MG/DL (ref 39–259)
ERYTHROCYTE [DISTWIDTH] IN BLOOD BY AUTOMATED COUNT: 13.4 % (ref 11.8–14.4)
GFR, ESTIMATED: 42 ML/MIN/1.73M2
GLUCOSE SERPL-MCNC: 396 MG/DL (ref 74–99)
HCT VFR BLD AUTO: 45.7 % (ref 40.7–50.3)
HGB BLD-MCNC: 14.3 G/DL (ref 13–17)
MCH RBC QN AUTO: 29.4 PG (ref 25.2–33.5)
MCHC RBC AUTO-ENTMCNC: 31.3 G/DL (ref 28.4–34.8)
MCV RBC AUTO: 94 FL (ref 82.6–102.9)
NRBC BLD-RTO: 0 PER 100 WBC
PHOSPHATE SERPL-MCNC: 2.4 MG/DL (ref 2.5–4.5)
PLATELET # BLD AUTO: 124 K/UL (ref 138–453)
PMV BLD AUTO: 9.7 FL (ref 8.1–13.5)
POTASSIUM SERPL-SCNC: 5.1 MMOL/L (ref 3.7–5.3)
PTH-INTACT SERPL-MCNC: 55 PG/ML (ref 17.9–58.6)
RBC # BLD AUTO: 4.86 M/UL (ref 4.21–5.77)
SODIUM SERPL-SCNC: 135 MMOL/L (ref 136–145)
TOTAL PROTEIN, URINE: 7 MG/DL
URINE TOTAL PROTEIN CREATININE RATIO: 0.22 (ref 0–0.2)
WBC OTHER # BLD: 9.1 K/UL (ref 3.5–11.3)

## 2025-07-01 PROCEDURE — 82040 ASSAY OF SERUM ALBUMIN: CPT

## 2025-07-01 PROCEDURE — 85027 COMPLETE CBC AUTOMATED: CPT

## 2025-07-01 PROCEDURE — 36415 COLL VENOUS BLD VENIPUNCTURE: CPT

## 2025-07-01 PROCEDURE — 84100 ASSAY OF PHOSPHORUS: CPT

## 2025-07-01 PROCEDURE — 80048 BASIC METABOLIC PNL TOTAL CA: CPT

## 2025-07-01 PROCEDURE — 84156 ASSAY OF PROTEIN URINE: CPT

## 2025-07-01 PROCEDURE — 82570 ASSAY OF URINE CREATININE: CPT

## 2025-07-01 PROCEDURE — 83970 ASSAY OF PARATHORMONE: CPT

## 2025-07-05 DIAGNOSIS — J44.9 STAGE 1 MILD COPD BY GOLD CLASSIFICATION (HCC): ICD-10-CM

## 2025-07-07 RX ORDER — UMECLIDINIUM BROMIDE AND VILANTEROL TRIFENATATE 62.5; 25 UG/1; UG/1
POWDER RESPIRATORY (INHALATION)
Qty: 1 EACH | Refills: 3 | Status: SHIPPED | OUTPATIENT
Start: 2025-07-07

## 2025-08-13 ENCOUNTER — OFFICE VISIT (OUTPATIENT)
Dept: CARDIOLOGY | Age: 89
End: 2025-08-13
Payer: MEDICARE

## 2025-08-13 ENCOUNTER — HOSPITAL ENCOUNTER (OUTPATIENT)
Dept: LAB | Age: 89
Discharge: HOME | End: 2025-08-13
Payer: MEDICARE

## 2025-08-13 VITALS
BODY MASS INDEX: 29.29 KG/M2 | OXYGEN SATURATION: 100 % | HEIGHT: 67 IN | HEART RATE: 97 BPM | DIASTOLIC BLOOD PRESSURE: 79 MMHG | SYSTOLIC BLOOD PRESSURE: 141 MMHG | WEIGHT: 186.6 LBS

## 2025-08-13 DIAGNOSIS — I73.9 PVD (PERIPHERAL VASCULAR DISEASE): ICD-10-CM

## 2025-08-13 DIAGNOSIS — I48.20 CHRONIC ATRIAL FIBRILLATION (HCC): Primary | ICD-10-CM

## 2025-08-13 DIAGNOSIS — I49.5 TACHYCARDIA-BRADYCARDIA SYNDROME (HCC): ICD-10-CM

## 2025-08-13 DIAGNOSIS — E78.2 MIXED HYPERLIPIDEMIA: ICD-10-CM

## 2025-08-13 DIAGNOSIS — I48.20 CHRONIC ATRIAL FIBRILLATION (HCC): ICD-10-CM

## 2025-08-13 DIAGNOSIS — I25.119 CORONARY ARTERY DISEASE INVOLVING NATIVE CORONARY ARTERY OF NATIVE HEART WITH ANGINA PECTORIS: ICD-10-CM

## 2025-08-13 DIAGNOSIS — Z95.0 PACEMAKER: ICD-10-CM

## 2025-08-13 DIAGNOSIS — I10 ESSENTIAL HYPERTENSION: ICD-10-CM

## 2025-08-13 DIAGNOSIS — Z95.1 S/P CABG (CORONARY ARTERY BYPASS GRAFT): ICD-10-CM

## 2025-08-13 DIAGNOSIS — I50.32 CHRONIC DIASTOLIC CHF (CONGESTIVE HEART FAILURE) (HCC): ICD-10-CM

## 2025-08-13 DIAGNOSIS — I34.0 NONRHEUMATIC MITRAL VALVE REGURGITATION: ICD-10-CM

## 2025-08-13 LAB
CHOLEST SERPL-MCNC: 187 MG/DL (ref 0–199)
CHOLESTEROL/HDL RATIO: 4.3
HDLC SERPL-MCNC: 44 MG/DL
LDLC SERPL CALC-MCNC: 72 MG/DL (ref 0–100)
TRIGL SERPL-MCNC: 357 MG/DL
VLDLC SERPL CALC-MCNC: 71 MG/DL (ref 1–30)

## 2025-08-13 PROCEDURE — 99214 OFFICE O/P EST MOD 30 MIN: CPT | Performed by: NURSE PRACTITIONER

## 2025-08-13 PROCEDURE — 36415 COLL VENOUS BLD VENIPUNCTURE: CPT

## 2025-08-13 PROCEDURE — 93005 ELECTROCARDIOGRAM TRACING: CPT | Performed by: NURSE PRACTITIONER

## 2025-08-13 PROCEDURE — 80061 LIPID PANEL: CPT

## 2025-08-13 PROCEDURE — 1159F MED LIST DOCD IN RCRD: CPT | Performed by: NURSE PRACTITIONER

## 2025-08-13 PROCEDURE — 1036F TOBACCO NON-USER: CPT | Performed by: NURSE PRACTITIONER

## 2025-08-13 PROCEDURE — 93010 ELECTROCARDIOGRAM REPORT: CPT | Performed by: NURSE PRACTITIONER

## 2025-08-13 PROCEDURE — 99211 OFF/OP EST MAY X REQ PHY/QHP: CPT | Performed by: INTERNAL MEDICINE

## 2025-08-13 PROCEDURE — G8417 CALC BMI ABV UP PARAM F/U: HCPCS | Performed by: NURSE PRACTITIONER

## 2025-08-13 PROCEDURE — 1123F ACP DISCUSS/DSCN MKR DOCD: CPT | Performed by: NURSE PRACTITIONER

## 2025-08-13 PROCEDURE — G8427 DOCREV CUR MEDS BY ELIG CLIN: HCPCS | Performed by: NURSE PRACTITIONER

## 2025-08-14 ENCOUNTER — RESULTS FOLLOW-UP (OUTPATIENT)
Dept: CARDIOLOGY | Age: 89
End: 2025-08-14

## 2025-08-21 ENCOUNTER — TRANSCRIBE ORDERS (OUTPATIENT)
Dept: ADMINISTRATIVE | Age: 89
End: 2025-08-21

## 2025-08-21 DIAGNOSIS — M81.0 OSTEOPOROSIS, UNSPECIFIED OSTEOPOROSIS TYPE, UNSPECIFIED PATHOLOGICAL FRACTURE PRESENCE: Primary | ICD-10-CM

## 2025-09-02 ENCOUNTER — HOSPITAL ENCOUNTER (OUTPATIENT)
Age: 89
Discharge: HOME | End: 2025-09-04
Attending: INTERNAL MEDICINE
Payer: MEDICARE

## 2025-09-02 VITALS
HEIGHT: 67 IN | SYSTOLIC BLOOD PRESSURE: 141 MMHG | BODY MASS INDEX: 29.19 KG/M2 | DIASTOLIC BLOOD PRESSURE: 79 MMHG | WEIGHT: 186 LBS

## 2025-09-02 DIAGNOSIS — I50.32 CHRONIC DIASTOLIC CHF (CONGESTIVE HEART FAILURE) (HCC): ICD-10-CM

## 2025-09-02 DIAGNOSIS — Z95.1 S/P CABG (CORONARY ARTERY BYPASS GRAFT): ICD-10-CM

## 2025-09-02 DIAGNOSIS — M81.0 OSTEOPOROSIS, UNSPECIFIED OSTEOPOROSIS TYPE, UNSPECIFIED PATHOLOGICAL FRACTURE PRESENCE: ICD-10-CM

## 2025-09-02 DIAGNOSIS — I49.5 TACHYCARDIA-BRADYCARDIA SYNDROME (HCC): ICD-10-CM

## 2025-09-02 DIAGNOSIS — I48.20 CHRONIC ATRIAL FIBRILLATION (HCC): ICD-10-CM

## 2025-09-02 DIAGNOSIS — I34.0 NONRHEUMATIC MITRAL VALVE REGURGITATION: ICD-10-CM

## 2025-09-02 DIAGNOSIS — Z95.0 PACEMAKER: ICD-10-CM

## 2025-09-02 DIAGNOSIS — E78.2 MIXED HYPERLIPIDEMIA: ICD-10-CM

## 2025-09-02 DIAGNOSIS — I10 ESSENTIAL HYPERTENSION: ICD-10-CM

## 2025-09-02 DIAGNOSIS — I73.9 PVD (PERIPHERAL VASCULAR DISEASE): ICD-10-CM

## 2025-09-02 LAB
ECHO AO SINUS VALSALVA DIAM: 3.6 CM
ECHO AO SINUS VALSALVA INDEX: 1.84 CM/M2
ECHO AV AREA PEAK VELOCITY: 1.3 CM2
ECHO AV AREA VTI: 1.3 CM2
ECHO AV AREA/BSA PEAK VELOCITY: 0.7 CM2/M2
ECHO AV AREA/BSA VTI: 0.7 CM2/M2
ECHO AV CUSP MM: 1 CM
ECHO AV MEAN GRADIENT: 6 MMHG
ECHO AV MEAN VELOCITY: 1.1 M/S
ECHO AV PEAK GRADIENT: 11 MMHG
ECHO AV PEAK VELOCITY: 1.7 M/S
ECHO AV VELOCITY RATIO: 0.41
ECHO AV VTI: 28.9 CM
ECHO BSA: 2 M2
ECHO EST RA PRESSURE: 3 MMHG
ECHO LA AREA 2C: 27.4 CM2
ECHO LA AREA 4C: 24.9 CM2
ECHO LA MAJOR AXIS: 5.9 CM
ECHO LA MINOR AXIS: 6.3 CM
ECHO LA VOL BP: 96 ML (ref 18–58)
ECHO LA VOL MOD A2C: 101 ML (ref 18–58)
ECHO LA VOL MOD A4C: 87 ML (ref 18–58)
ECHO LA VOL/BSA BIPLANE: 49 ML/M2 (ref 16–34)
ECHO LA VOLUME INDEX MOD A2C: 52 ML/M2 (ref 16–34)
ECHO LA VOLUME INDEX MOD A4C: 44 ML/M2 (ref 16–34)
ECHO LV E' LATERAL VELOCITY: 10.3 CM/S
ECHO LV EDV A2C: 81 ML
ECHO LV EDV A4C: 68 ML
ECHO LV EDV INDEX A4C: 35 ML/M2
ECHO LV EDV NDEX A2C: 41 ML/M2
ECHO LV EF PHYSICIAN: 55 %
ECHO LV EJECTION FRACTION A2C: 37 %
ECHO LV EJECTION FRACTION A4C: 38 %
ECHO LV EJECTION FRACTION BIPLANE: 51 % (ref 55–100)
ECHO LV ESV A2C: 47 ML
ECHO LV ESV A4C: 42 ML
ECHO LV ESV INDEX A2C: 24 ML/M2
ECHO LV ESV INDEX A4C: 21 ML/M2
ECHO LV FRACTIONAL SHORTENING: 15 % (ref 28–44)
ECHO LV INTERNAL DIMENSION DIASTOLE INDEX: 2.65 CM/M2
ECHO LV INTERNAL DIMENSION DIASTOLIC: 5.2 CM (ref 4.2–5.9)
ECHO LV INTERNAL DIMENSION SYSTOLIC INDEX: 2.24 CM/M2
ECHO LV INTERNAL DIMENSION SYSTOLIC: 4.4 CM
ECHO LV IVSD: 1 CM (ref 0.6–1)
ECHO LV MASS 2D: 207.3 G (ref 88–224)
ECHO LV MASS INDEX 2D: 105.8 G/M2 (ref 49–115)
ECHO LV POSTERIOR WALL DIASTOLIC: 1.1 CM (ref 0.6–1)
ECHO LV RELATIVE WALL THICKNESS RATIO: 0.42
ECHO LVOT AREA: 3.1 CM2
ECHO LVOT AV VTI INDEX: 0.4
ECHO LVOT DIAM: 2 CM
ECHO LVOT MEAN GRADIENT: 1 MMHG
ECHO LVOT PEAK GRADIENT: 2 MMHG
ECHO LVOT PEAK VELOCITY: 0.7 M/S
ECHO LVOT STROKE VOLUME INDEX: 18.6 ML/M2
ECHO LVOT SV: 36.4 ML
ECHO LVOT VTI: 11.6 CM
ECHO MV E DECELERATION TIME (DT): 137 MS
ECHO MV E VELOCITY: 1.03 M/S
ECHO MV E/E' LATERAL: 10
ECHO PV MAX VELOCITY: 1.3 M/S
ECHO PV PEAK GRADIENT: 6 MMHG
ECHO RIGHT VENTRICULAR SYSTOLIC PRESSURE (RVSP): 40 MMHG
ECHO RV BASAL DIMENSION: 3.7 CM
ECHO RV TAPSE: 2 CM (ref 1.7–?)
ECHO TV REGURGITANT MAX VELOCITY: 3.05 M/S
ECHO TV REGURGITANT PEAK GRADIENT: 37 MMHG

## 2025-09-02 PROCEDURE — 77080 DXA BONE DENSITY AXIAL: CPT

## 2025-09-02 PROCEDURE — 93306 TTE W/DOPPLER COMPLETE: CPT | Performed by: INTERNAL MEDICINE

## 2025-09-02 PROCEDURE — 93306 TTE W/DOPPLER COMPLETE: CPT
